# Patient Record
Sex: FEMALE | Race: WHITE | Employment: OTHER | URBAN - METROPOLITAN AREA
[De-identification: names, ages, dates, MRNs, and addresses within clinical notes are randomized per-mention and may not be internally consistent; named-entity substitution may affect disease eponyms.]

---

## 2017-11-08 ENCOUNTER — OFFICE VISIT (OUTPATIENT)
Dept: FAMILY MEDICINE CLINIC | Age: 62
End: 2017-11-08

## 2017-11-08 VITALS
WEIGHT: 196 LBS | HEART RATE: 85 BPM | DIASTOLIC BLOOD PRESSURE: 90 MMHG | SYSTOLIC BLOOD PRESSURE: 150 MMHG | OXYGEN SATURATION: 96 % | HEIGHT: 65 IN | BODY MASS INDEX: 32.65 KG/M2

## 2017-11-08 DIAGNOSIS — I15.9 SECONDARY HYPERTENSION: ICD-10-CM

## 2017-11-08 DIAGNOSIS — Z23 NEED FOR TDAP VACCINATION: ICD-10-CM

## 2017-11-08 DIAGNOSIS — Z12.39 BREAST CANCER SCREENING: ICD-10-CM

## 2017-11-08 DIAGNOSIS — Z23 NEED FOR INFLUENZA VACCINATION: Primary | ICD-10-CM

## 2017-11-08 DIAGNOSIS — E78.49 OTHER HYPERLIPIDEMIA: ICD-10-CM

## 2017-11-08 PROCEDURE — 90472 IMMUNIZATION ADMIN EACH ADD: CPT | Performed by: FAMILY MEDICINE

## 2017-11-08 PROCEDURE — 90630 INFLUENZA, QUADV, 18-64 YRS, ID, PF, MICRO INJ, 0.1ML (FLUZONE QUADV, PF): CPT | Performed by: FAMILY MEDICINE

## 2017-11-08 PROCEDURE — 3017F COLORECTAL CA SCREEN DOC REV: CPT | Performed by: FAMILY MEDICINE

## 2017-11-08 PROCEDURE — 99204 OFFICE O/P NEW MOD 45 MIN: CPT | Performed by: FAMILY MEDICINE

## 2017-11-08 PROCEDURE — 90715 TDAP VACCINE 7 YRS/> IM: CPT | Performed by: FAMILY MEDICINE

## 2017-11-08 PROCEDURE — 90471 IMMUNIZATION ADMIN: CPT | Performed by: FAMILY MEDICINE

## 2017-11-08 PROCEDURE — G8417 CALC BMI ABV UP PARAM F/U: HCPCS | Performed by: FAMILY MEDICINE

## 2017-11-08 PROCEDURE — G8427 DOCREV CUR MEDS BY ELIG CLIN: HCPCS | Performed by: FAMILY MEDICINE

## 2017-11-08 PROCEDURE — 4004F PT TOBACCO SCREEN RCVD TLK: CPT | Performed by: FAMILY MEDICINE

## 2017-11-08 PROCEDURE — G8484 FLU IMMUNIZE NO ADMIN: HCPCS | Performed by: FAMILY MEDICINE

## 2017-11-08 PROCEDURE — 3014F SCREEN MAMMO DOC REV: CPT | Performed by: FAMILY MEDICINE

## 2017-11-08 RX ORDER — LOSARTAN POTASSIUM 100 MG/1
TABLET ORAL
Refills: 0 | COMMUNITY
Start: 2017-10-12 | End: 2018-07-13 | Stop reason: SDUPTHER

## 2017-11-08 RX ORDER — ESTROGEN,CON/M-PROGEST ACET 0.625-2.5
TABLET ORAL
Refills: 2 | COMMUNITY
Start: 2017-10-01 | End: 2018-11-13

## 2017-11-08 RX ORDER — ATORVASTATIN CALCIUM 20 MG/1
TABLET, FILM COATED ORAL
Refills: 1 | COMMUNITY
Start: 2017-10-23 | End: 2018-01-22 | Stop reason: SDUPTHER

## 2017-11-08 ASSESSMENT — ENCOUNTER SYMPTOMS
CONSTIPATION: 0
DIARRHEA: 0
COUGH: 0
ABDOMINAL PAIN: 0
SHORTNESS OF BREATH: 0
CHEST TIGHTNESS: 0

## 2017-11-08 ASSESSMENT — PATIENT HEALTH QUESTIONNAIRE - PHQ9
SUM OF ALL RESPONSES TO PHQ9 QUESTIONS 1 & 2: 2
2. FEELING DOWN, DEPRESSED OR HOPELESS: 1
1. LITTLE INTEREST OR PLEASURE IN DOING THINGS: 1
SUM OF ALL RESPONSES TO PHQ QUESTIONS 1-9: 2

## 2017-11-08 NOTE — PROGRESS NOTES
Vaccine Information Sheet, \"Influenza - Inactivated\"  given to Jairo Jean, or parent/legal guardian of  Jairo Jean and verbalized understanding. Patient responses:    Have you ever had a reaction to a flu vaccine? No  Are you able to eat eggs without adverse effects? Yes  Do you have any current illness? No  Have you ever had Guillian [place] Syndrome? No    Flu vaccine given per order. Please see immunization tab.

## 2017-11-08 NOTE — PROGRESS NOTES
Damaris Burgess  : 1955  Encounter date: 2017    This is a 59 y.o. female who presents to establish care. Chief Complaint   Patient presents with    New Patient     Patient is here to establish a physician with no concerns. History of present illness:    Feeling a little depressed since moving down here. Had lived in previous location all of life. No significant hx of depression. Moved to this area to be closer to daughter and grandchildren. Had been doing water therapy 5 days a week for awhile, but hasn't here. Right hip bothers her; told she had bursitis. Seems like any activity flares this. Feels that lower extremities are weak. Did not do PT after spinal stenosis surgery. Right weaker. Tolerates chronic meds ok. Started prempro in  for hot flashes (getting them every 20 minutes 24 hours a day. Really affected overall functioning/work). Did lower dose a couple of years ago; didn't go well. Lowering dose felt like she was more emotional; cried easier. Hadn't tried anything else for this in past.     Anxiety is present but seems pretty limited to being passenger in vehicle. Anxiety seemed worse and panic attacks were worse when coming off hormone previously. Has some desire to quit smoking. Tried patches - gave her insomnia. Has quit in the past cold turkey, but states longest period of time was a couple of months.     Past Medical History:   Diagnosis Date    Hyperlipidemia     Hypertension      Past Surgical History:   Procedure Laterality Date    BACK SURGERY      spinal stenosis    LUNG SURGERY      not cancer     Allergies   Allergen Reactions    Codeine Other (See Comments)     Wasn't able to speak or move     No outpatient prescriptions have been marked as taking for the 17 encounter (Office Visit) with Veronica Osuna MD.     Social History   Substance Use Topics    Smoking status: Current Every Day Smoker     Packs/day: 0.50     Years: 20.00 Types: Cigarettes    Smokeless tobacco: Never Used    Alcohol use 3.0 oz/week     5 Glasses of wine per week     Family History   Problem Relation Age of Onset    Cancer Mother      thyroid    High Cholesterol Mother     Heart Disease Sister          Review of Systems   Constitutional: Negative for fatigue. Respiratory: Negative for cough, chest tightness and shortness of breath. Cardiovascular: Negative for chest pain. Gastrointestinal: Negative for abdominal pain, constipation and diarrhea. Psychiatric/Behavioral: Negative for suicidal ideas. The patient is nervous/anxious (see HPI). Objective:    BP (!) 150/90   Pulse 85   Ht 5' 5.25\" (1.657 m)   Wt 196 lb (88.9 kg)   SpO2 96%   BMI 32.37 kg/m²   Weight: 196 lb (88.9 kg)     BP Readings from Last 3 Encounters:   11/08/17 (!) 150/90     Wt Readings from Last 3 Encounters:   11/08/17 196 lb (88.9 kg)       Physical Exam   Constitutional: She appears well-developed and well-nourished. Cardiovascular: Normal rate, regular rhythm and normal heart sounds. No murmur heard. 2+pedal pulses; no LE edema   Pulmonary/Chest: Effort normal and breath sounds normal. No respiratory distress. She has no wheezes. She has no rales. Musculoskeletal:   There is slight tenderness to right trochanteric bursa, and more significant tenderness of right IT band. Neurological: She displays no atrophy. Reflex Scores:       Patellar reflexes are 1+ on the right side and 2+ on the left side. Achilles reflexes are 1+ on the right side and 1+ on the left side. Right hip flexion 4.5 out of 5, right hamstring 4.5 out of 5, right quad 4.5/5. Left hip flexion 4.5/5, quad 4.5/5, hamstring 5/5. Assessment/Plan:    1. Need for influenza vaccination  Completed in office today. - INFLUENZA, QUADV, 18-64 YRS, ID, PF, MICRO INJ, 0.1ML (FLUZONE QUADV, PF)    2. Breast cancer screening    - DAVID Screening Bilateral; Future    3.  Need for Tdap

## 2017-11-08 NOTE — PROGRESS NOTES
Vaccine Information Sheet, \"Influenza - Inactivated\"  given to Shira Mahoney, or parent/legal guardian of  Shira [place] and verbalized understanding. Patient responses:    Have you ever had a reaction to a flu vaccine? No  Are you able to eat eggs without adverse effects? Yes  Do you have any current illness? No  Have you ever had Guillian [place] Syndrome? No    Flu vaccine given per order. Please see immunization tab.

## 2017-11-08 NOTE — PATIENT INSTRUCTIONS
Consider the effexor in place of the prempro; if interested just let me know and I can send in and give you instructions on how to taper the prempro/start the effexor. Discussed health consequences of smoking and benefits of smoking cessation with patient in detail. Discussed medical treatment options including patches, gums, zyban, and chantix. Encouraged to let me know if any concerns or if needing help with quitting. 1-800-QUIT-NOW      Patient Education        Influenza (Flu) Vaccine (Inactivated or Recombinant): What You Need to Know  Why get vaccinated? Influenza (\"flu\") is a contagious disease that spreads around the [facility] every winter, usually between October and May. Flu is caused by influenza viruses and is spread mainly by coughing, sneezing, and close contact. Anyone can get flu. Flu strikes suddenly and can last several days. Symptoms vary by age, but can include:  · Fever/chills. · Sore throat. · Muscle aches. · Fatigue. · Cough. · Headache. · Runny or stuffy nose. Flu can also lead to pneumonia and blood infections, and cause diarrhea and seizures in children. If you have a medical condition, such as heart or lung disease, flu can make it worse. Flu is more dangerous for some people. Infants and young children, people 72years of age and older, pregnant women, and people with certain health conditions or a weakened immune system are at greatest risk. Each year thousands of people in the [facility] die from flu, and many more are hospitalized. Flu vaccine can:  · Keep you from getting flu. · Make flu less severe if you do get it. · Keep you from spreading flu to your family and other people. Inactivated and recombinant flu vaccines  A dose of flu vaccine is recommended every flu season. Children 6 months through 6years of age may need two doses during the same flu season. Everyone else needs only one dose each flu season.   Some inactivated flu vaccines contain a very small amount of a mercury-based preservative called thimerosal. Studies have not shown thimerosal in vaccines to be harmful, but flu vaccines that do not contain thimerosal are available. There is no live flu virus in flu shots. They cannot cause the flu. There are many flu viruses, and they are always changing. Each year a new flu vaccine is made to protect against three or four viruses that are likely to cause disease in the upcoming flu season. But even when the vaccine doesn't exactly match these viruses, it may still provide some protection. Flu vaccine cannot prevent:  · Flu that is caused by a virus not covered by the vaccine. · Illnesses that look like flu but are not. Some people should not get this vaccine  Tell the person who is giving you the vaccine:  · If you have any severe (life-threatening) allergies. If you ever had a life-threatening allergic reaction after a dose of flu vaccine, or have a severe allergy to any part of this vaccine, you may be advised not to get vaccinated. Most, but not all, types of flu vaccine contain a small amount of egg protein. · If you ever had Guillain-Barré syndrome (also called GBS) Some people with a history of GBS should not get this vaccine. This should be discussed with your doctor. · If you are not feeling well. It is usually okay to get flu vaccine when you have a mild illness, but you might be asked to come back when you feel better. Risks of a vaccine reaction  With any medicine, including vaccines, there is a chance of reactions. These are usually mild and go away on their own, but serious reactions are also possible. Most people who get a flu shot do not have any problems with it.   Minor problems following a flu shot include:  · Soreness, redness, or swelling where the shot was given  · Hoarseness  · Sore, red or itchy eyes  · Cough  · Fever  · Aches  · Headache  · Itching  · Fatigue  If these problems occur, they usually begin soon after the shot and this information.

## 2017-11-27 ENCOUNTER — HOSPITAL ENCOUNTER (OUTPATIENT)
Dept: MAMMOGRAPHY | Age: 62
Discharge: HOME | End: 2017-11-27
Attending: FAMILY MEDICINE | Admitting: FAMILY MEDICINE

## 2017-11-27 DIAGNOSIS — I15.9 SECONDARY HYPERTENSION: ICD-10-CM

## 2017-11-27 DIAGNOSIS — Z12.39 BREAST CANCER SCREENING: ICD-10-CM

## 2017-11-27 DIAGNOSIS — E78.49 OTHER HYPERLIPIDEMIA: ICD-10-CM

## 2017-11-27 LAB
A/G RATIO: 1.4 (ref 1.1–2.2)
ALBUMIN SERPL-MCNC: 4 G/DL (ref 3.4–5)
ALP BLD-CCNC: 55 U/L (ref 40–129)
ALT SERPL-CCNC: 25 U/L (ref 10–40)
ANION GAP SERPL CALCULATED.3IONS-SCNC: 15 MMOL/L (ref 3–16)
AST SERPL-CCNC: 22 U/L (ref 15–37)
BASOPHILS ABSOLUTE: 0.1 K/UL (ref 0–0.2)
BASOPHILS RELATIVE PERCENT: 1.1 %
BILIRUB SERPL-MCNC: 0.7 MG/DL (ref 0–1)
BUN BLDV-MCNC: 12 MG/DL (ref 7–20)
CALCIUM SERPL-MCNC: 9.7 MG/DL (ref 8.3–10.6)
CHLORIDE BLD-SCNC: 105 MMOL/L (ref 99–110)
CHOLESTEROL, TOTAL: 183 MG/DL (ref 0–199)
CO2: 24 MMOL/L (ref 21–32)
CREAT SERPL-MCNC: 0.6 MG/DL (ref 0.6–1.2)
EOSINOPHILS ABSOLUTE: 0.1 K/UL (ref 0–0.6)
EOSINOPHILS RELATIVE PERCENT: 1.9 %
GFR AFRICAN AMERICAN: >60
GFR NON-AFRICAN AMERICAN: >60
GLOBULIN: 2.8 G/DL
GLUCOSE BLD-MCNC: 99 MG/DL (ref 70–99)
HCT VFR BLD CALC: 41.3 % (ref 36–48)
HDLC SERPL-MCNC: 88 MG/DL (ref 40–60)
HEMOGLOBIN: 13.8 G/DL (ref 12–16)
LDL CHOLESTEROL CALCULATED: 74 MG/DL
LYMPHOCYTES ABSOLUTE: 1.8 K/UL (ref 1–5.1)
LYMPHOCYTES RELATIVE PERCENT: 30.4 %
MCH RBC QN AUTO: 33.7 PG (ref 26–34)
MCHC RBC AUTO-ENTMCNC: 33.5 G/DL (ref 31–36)
MCV RBC AUTO: 100.5 FL (ref 80–100)
MONOCYTES ABSOLUTE: 0.5 K/UL (ref 0–1.3)
MONOCYTES RELATIVE PERCENT: 8 %
NEUTROPHILS ABSOLUTE: 3.4 K/UL (ref 1.7–7.7)
NEUTROPHILS RELATIVE PERCENT: 58.6 %
PDW BLD-RTO: 13.9 % (ref 12.4–15.4)
PLATELET # BLD: 213 K/UL (ref 135–450)
PMV BLD AUTO: 8.7 FL (ref 5–10.5)
POTASSIUM SERPL-SCNC: 4.7 MMOL/L (ref 3.5–5.1)
RBC # BLD: 4.11 M/UL (ref 4–5.2)
SODIUM BLD-SCNC: 144 MMOL/L (ref 136–145)
TOTAL PROTEIN: 6.8 G/DL (ref 6.4–8.2)
TRIGL SERPL-MCNC: 106 MG/DL (ref 0–150)
VLDLC SERPL CALC-MCNC: 21 MG/DL
WBC # BLD: 5.8 K/UL (ref 4–11)

## 2017-11-30 ENCOUNTER — NURSE ONLY (OUTPATIENT)
Dept: FAMILY MEDICINE CLINIC | Age: 62
End: 2017-11-30

## 2017-11-30 VITALS — DIASTOLIC BLOOD PRESSURE: 108 MMHG | SYSTOLIC BLOOD PRESSURE: 150 MMHG | HEART RATE: 76 BPM

## 2017-11-30 DIAGNOSIS — I15.9 SECONDARY HYPERTENSION: Primary | ICD-10-CM

## 2017-11-30 NOTE — PROGRESS NOTES
Per Dr. Abelardo More there were a couple of lows at home so she is going to have her monitor her blood pressure at home and call or email us the bp readings.

## 2018-01-18 ENCOUNTER — TELEPHONE (OUTPATIENT)
Dept: FAMILY MEDICINE CLINIC | Age: 63
End: 2018-01-18

## 2018-01-25 RX ORDER — ATORVASTATIN CALCIUM 20 MG/1
TABLET, FILM COATED ORAL
Qty: 90 TABLET | Refills: 0 | Status: SHIPPED | OUTPATIENT
Start: 2018-01-25 | End: 2018-04-19 | Stop reason: SDUPTHER

## 2018-04-19 RX ORDER — ATORVASTATIN CALCIUM 20 MG/1
TABLET, FILM COATED ORAL
Qty: 90 TABLET | Refills: 0 | Status: SHIPPED | OUTPATIENT
Start: 2018-04-19 | End: 2018-06-19 | Stop reason: SDUPTHER

## 2018-06-19 RX ORDER — ATORVASTATIN CALCIUM 20 MG/1
TABLET, FILM COATED ORAL
Qty: 90 TABLET | Refills: 0 | Status: SHIPPED | OUTPATIENT
Start: 2018-06-19 | End: 2018-09-08 | Stop reason: SDUPTHER

## 2018-07-13 RX ORDER — LOSARTAN POTASSIUM 100 MG/1
TABLET ORAL
Qty: 30 TABLET | Refills: 2 | Status: SHIPPED | OUTPATIENT
Start: 2018-07-13 | End: 2018-10-17 | Stop reason: SDUPTHER

## 2018-07-26 ENCOUNTER — OFFICE VISIT (OUTPATIENT)
Dept: FAMILY MEDICINE CLINIC | Age: 63
End: 2018-07-26

## 2018-07-26 VITALS
SYSTOLIC BLOOD PRESSURE: 154 MMHG | DIASTOLIC BLOOD PRESSURE: 92 MMHG | WEIGHT: 195 LBS | HEIGHT: 66 IN | HEART RATE: 86 BPM | BODY MASS INDEX: 31.34 KG/M2 | OXYGEN SATURATION: 98 %

## 2018-07-26 DIAGNOSIS — Z72.0 TOBACCO ABUSE: ICD-10-CM

## 2018-07-26 DIAGNOSIS — Z00.00 PREVENTATIVE HEALTH CARE: Primary | ICD-10-CM

## 2018-07-26 DIAGNOSIS — N95.1 MENOPAUSAL SYMPTOMS: ICD-10-CM

## 2018-07-26 DIAGNOSIS — Z85.118 HISTORY OF ADENOCARCINOMA OF LUNG: ICD-10-CM

## 2018-07-26 DIAGNOSIS — Z12.4 SCREENING FOR CERVICAL CANCER: ICD-10-CM

## 2018-07-26 DIAGNOSIS — R25.2 MUSCLE CRAMPING: ICD-10-CM

## 2018-07-26 DIAGNOSIS — F41.9 ANXIETY: ICD-10-CM

## 2018-07-26 DIAGNOSIS — M70.61 TROCHANTERIC BURSITIS OF RIGHT HIP: ICD-10-CM

## 2018-07-26 DIAGNOSIS — E78.49 OTHER HYPERLIPIDEMIA: ICD-10-CM

## 2018-07-26 PROCEDURE — 20610 DRAIN/INJ JOINT/BURSA W/O US: CPT | Performed by: FAMILY MEDICINE

## 2018-07-26 PROCEDURE — 99396 PREV VISIT EST AGE 40-64: CPT | Performed by: FAMILY MEDICINE

## 2018-07-26 RX ORDER — TRIAMCINOLONE ACETONIDE 40 MG/ML
40 INJECTION, SUSPENSION INTRA-ARTICULAR; INTRAMUSCULAR ONCE
Status: COMPLETED | OUTPATIENT
Start: 2018-07-26 | End: 2018-07-26

## 2018-07-26 RX ORDER — TRIAMCINOLONE ACETONIDE 40 MG/ML
40 INJECTION, SUSPENSION INTRA-ARTICULAR; INTRAMUSCULAR ONCE
Qty: 1 ML | Refills: 0 | Status: SHIPPED | OUTPATIENT
Start: 2018-07-26 | End: 2018-07-26

## 2018-07-26 RX ORDER — VENLAFAXINE HYDROCHLORIDE 37.5 MG/1
37.5 CAPSULE, EXTENDED RELEASE ORAL DAILY
Qty: 30 CAPSULE | Refills: 2 | Status: SHIPPED | OUTPATIENT
Start: 2018-07-26 | End: 2018-10-10 | Stop reason: SDUPTHER

## 2018-07-26 RX ADMIN — TRIAMCINOLONE ACETONIDE 40 MG: 40 INJECTION, SUSPENSION INTRA-ARTICULAR; INTRAMUSCULAR at 13:55

## 2018-07-26 ASSESSMENT — ENCOUNTER SYMPTOMS
EYE DISCHARGE: 0
BACK PAIN: 0
COUGH: 0
ABDOMINAL PAIN: 0
VOMITING: 0
CONSTIPATION: 0
WHEEZING: 0
DIARRHEA: 0
RHINORRHEA: 0
SORE THROAT: 0
SINUS PRESSURE: 0
NAUSEA: 0
TROUBLE SWALLOWING: 0
SHORTNESS OF BREATH: 0
CHEST TIGHTNESS: 0

## 2018-07-26 ASSESSMENT — PATIENT HEALTH QUESTIONNAIRE - PHQ9
2. FEELING DOWN, DEPRESSED OR HOPELESS: 1
SUM OF ALL RESPONSES TO PHQ QUESTIONS 1-9: 1
1. LITTLE INTEREST OR PLEASURE IN DOING THINGS: 0
SUM OF ALL RESPONSES TO PHQ9 QUESTIONS 1 & 2: 1

## 2018-07-26 NOTE — PROGRESS NOTES
Matilde Maynard  : 1955  Encounter date: 2018    This is a 57 y.o. female who presents for complete physical     History of present illness/Additional concerns:    Has not had abnormal paps in past, but has had some bleeding issues. No spotting, no vaginal concerns today. Cost is concern for her with follow up for lungs; knows she should follow up with specialist and get repeat CT but feels she cannot afford it now. Right hip really bothering her. Bothers her with walking any distance; makes her limp. Keeps her from doing activities that she would like to do. Has worsened in last year. Can't lay on side for sleep (either side). Getting some radiation of pain down leg. Not wanting to quit smoking, but has thought about it. Past Medical History:   Diagnosis Date    Adenocarcinoma, lung ([address]     Hyperlipidemia     Hypertension      Past Surgical History:   Procedure Laterality Date    BACK SURGERY      spinal stenosis    LUNG SURGERY      left VATS for stage IA adenocarcinoma     Allergies   Allergen Reactions    Codeine Other (See Comments)     Wasn't able to speak or move     Outpatient Prescriptions Marked as Taking for the 18 encounter (Office Visit) with Paulina Ramos MD   Medication Sig Dispense Refill    venlafaxine (EFFEXOR XR) 37.5 MG extended release capsule Take 1 capsule by mouth daily 30 capsule 2    [] triamcinolone acetonide (KENALOG) 40 MG/ML injection Inject 1 mL into the muscle once for 1 dose 1 mL 0    losartan (COZAAR) 100 MG tablet TAKE 1 TABLET BY MOUTH EVERY DAY MUST FOLLOW UP IN OFFICE FOR MORE REFILLS 30 tablet 2    atorvastatin (LIPITOR) 20 MG tablet TAKE 1 TABLET BY MOUTH DAILY AT BEDTIME.  90 tablet 0    PREMPRO 0.625-2.5 MG per tablet TAKE 1 TABLET BY MOUTH EVERY DAY**NOT COVERE**  2     Social History   Substance Use Topics    Smoking status: Current Every Day Smoker     Packs/day: 0.50     Years: 20.00     Types: AGRATIO 1.4 11/27/2017    BILITOT 0.7 11/27/2017    ALKPHOS 55 11/27/2017    ALT 25 11/27/2017    AST 22 11/27/2017    GLOB 2.8 11/27/2017     LIPID:   Lab Results   Component Value Date    CHOL 183 11/27/2017    TRIG 106 11/27/2017    HDL 88 11/27/2017    LDLCALC 74 11/27/2017    LABVLDL 21 11/27/2017       Objective:    BP (!) 154/92 (Site: Left Arm, Position: Sitting, Cuff Size: Large Adult)   Pulse 86   Ht 5' 5.5\" (1.664 m)   Wt 195 lb (88.5 kg)   SpO2 98%   BMI 31.96 kg/m²   Weight: 195 lb (88.5 kg)     BP Readings from Last 3 Encounters:   07/26/18 (!) 154/92   11/30/17 (!) 150/108   11/08/17 (!) 150/90     Wt Readings from Last 3 Encounters:   07/26/18 195 lb (88.5 kg)   11/08/17 196 lb (88.9 kg)       Physical Exam   Constitutional: She is oriented to person, place, and time. She appears well-developed and well-nourished. No distress. HENT:   Head: Normocephalic and atraumatic. Eyes: Conjunctivae and EOM are normal. Pupils are equal, round, and reactive to light. Neck: Normal range of motion. Neck supple. Cardiovascular: Normal rate, regular rhythm and normal heart sounds. Pulmonary/Chest: Effort normal and breath sounds normal. Right breast exhibits no inverted nipple, no mass and no skin change. Left breast exhibits no inverted nipple, no mass and no skin change. Breasts are symmetrical.   Abdominal: Soft. Bowel sounds are normal.   Genitourinary: Rectal exam shows external hemorrhoid (nontender; multiple 0.75 cm). No breast swelling, tenderness, discharge or bleeding. Pelvic exam was performed with patient supine. There is no rash, tenderness, lesion or injury on the right labia. There is no rash, tenderness, lesion or injury on the left labia. Cervix exhibits no motion tenderness, no discharge and no friability. Right adnexum displays no mass, no tenderness and no fullness. Left adnexum displays no mass, no tenderness and no fullness.  Vaginal discharge (some white, cheesy discharge noted

## 2018-07-30 DIAGNOSIS — E78.49 OTHER HYPERLIPIDEMIA: ICD-10-CM

## 2018-07-30 DIAGNOSIS — R25.2 MUSCLE CRAMPING: ICD-10-CM

## 2018-07-30 LAB
A/G RATIO: 2.1 (ref 1.1–2.2)
ALBUMIN SERPL-MCNC: 4.8 G/DL (ref 3.4–5)
ALP BLD-CCNC: 58 U/L (ref 40–129)
ALT SERPL-CCNC: 23 U/L (ref 10–40)
ANION GAP SERPL CALCULATED.3IONS-SCNC: 16 MMOL/L (ref 3–16)
AST SERPL-CCNC: 20 U/L (ref 15–37)
BASOPHILS ABSOLUTE: 0.1 K/UL (ref 0–0.2)
BASOPHILS RELATIVE PERCENT: 1 %
BILIRUB SERPL-MCNC: 0.7 MG/DL (ref 0–1)
BUN BLDV-MCNC: 13 MG/DL (ref 7–20)
CALCIUM SERPL-MCNC: 10 MG/DL (ref 8.3–10.6)
CHLORIDE BLD-SCNC: 101 MMOL/L (ref 99–110)
CHOLESTEROL, TOTAL: 206 MG/DL (ref 0–199)
CO2: 24 MMOL/L (ref 21–32)
CREAT SERPL-MCNC: 0.6 MG/DL (ref 0.6–1.2)
EOSINOPHILS ABSOLUTE: 0.1 K/UL (ref 0–0.6)
EOSINOPHILS RELATIVE PERCENT: 0.7 %
GFR AFRICAN AMERICAN: >60
GFR NON-AFRICAN AMERICAN: >60
GLOBULIN: 2.3 G/DL
GLUCOSE BLD-MCNC: 71 MG/DL (ref 70–99)
HCT VFR BLD CALC: 43.7 % (ref 36–48)
HDLC SERPL-MCNC: 111 MG/DL (ref 40–60)
HEMOGLOBIN: 14.6 G/DL (ref 12–16)
HPV COMMENT: NORMAL
HPV TYPE 16: NOT DETECTED
HPV TYPE 18: NOT DETECTED
HPVOH (OTHER TYPES): NOT DETECTED
LDL CHOLESTEROL CALCULATED: 77 MG/DL
LYMPHOCYTES ABSOLUTE: 1.8 K/UL (ref 1–5.1)
LYMPHOCYTES RELATIVE PERCENT: 20.7 %
MCH RBC QN AUTO: 34.3 PG (ref 26–34)
MCHC RBC AUTO-ENTMCNC: 33.4 G/DL (ref 31–36)
MCV RBC AUTO: 102.8 FL (ref 80–100)
MONOCYTES ABSOLUTE: 0.5 K/UL (ref 0–1.3)
MONOCYTES RELATIVE PERCENT: 5.4 %
NEUTROPHILS ABSOLUTE: 6.2 K/UL (ref 1.7–7.7)
NEUTROPHILS RELATIVE PERCENT: 72.2 %
PDW BLD-RTO: 13.8 % (ref 12.4–15.4)
PLATELET # BLD: 247 K/UL (ref 135–450)
PMV BLD AUTO: 8.1 FL (ref 5–10.5)
POTASSIUM SERPL-SCNC: 4 MMOL/L (ref 3.5–5.1)
RBC # BLD: 4.25 M/UL (ref 4–5.2)
SODIUM BLD-SCNC: 141 MMOL/L (ref 136–145)
TOTAL PROTEIN: 7.1 G/DL (ref 6.4–8.2)
TRIGL SERPL-MCNC: 88 MG/DL (ref 0–150)
TSH SERPL DL<=0.05 MIU/L-ACNC: 0.64 UIU/ML (ref 0.27–4.2)
VLDLC SERPL CALC-MCNC: 18 MG/DL
WBC # BLD: 8.7 K/UL (ref 4–11)

## 2018-08-02 DIAGNOSIS — R89.9 ABNORMAL LABORATORY TEST RESULT: Primary | ICD-10-CM

## 2018-08-02 LAB
FOLATE: 4.34 NG/ML (ref 4.78–24.2)
VITAMIN B-12: 222 PG/ML (ref 211–911)

## 2018-08-03 DIAGNOSIS — E53.8 LOW VITAMIN B12 LEVEL: Primary | ICD-10-CM

## 2018-08-03 DIAGNOSIS — E53.8 LOW FOLIC ACID: ICD-10-CM

## 2018-08-03 DIAGNOSIS — E53.8 LOW VITAMIN B12 LEVEL: ICD-10-CM

## 2018-08-06 ENCOUNTER — NURSE ONLY (OUTPATIENT)
Dept: FAMILY MEDICINE CLINIC | Age: 63
End: 2018-08-06

## 2018-08-06 VITALS — HEART RATE: 74 BPM | DIASTOLIC BLOOD PRESSURE: 88 MMHG | SYSTOLIC BLOOD PRESSURE: 122 MMHG

## 2018-08-06 NOTE — PROGRESS NOTES
Patient stopped by office to have her monitor check due to changing some medications at her last visit. Blood pressure using the office wall monitor: 122/88. Blood pressure using patient's home monitor 137/93. Patient voices no concerns of feeling light headed or dizziness. Patient advised that Dr. Marcia Bose is out of the office today but will be back tomorrow. Patient advised that I could speak with another doctor in the office. Patient states that it can wait until Dr. Marcia Bose comes back. Patient was just wanting to see the difference between our monitor and her monitor. Patient advised that usine an automatic blood pressure cuff and a kindra blood pressure there can be a slight difference.

## 2018-08-09 ENCOUNTER — TELEPHONE (OUTPATIENT)
Dept: FAMILY MEDICINE CLINIC | Age: 63
End: 2018-08-09

## 2018-09-10 RX ORDER — ATORVASTATIN CALCIUM 20 MG/1
TABLET, FILM COATED ORAL
Qty: 90 TABLET | Refills: 0 | Status: SHIPPED | OUTPATIENT
Start: 2018-09-10 | End: 2018-12-03 | Stop reason: SDUPTHER

## 2018-10-08 RX ORDER — LOSARTAN POTASSIUM 100 MG/1
TABLET ORAL
Qty: 30 TABLET | Refills: 2 | OUTPATIENT
Start: 2018-10-08

## 2018-10-10 DIAGNOSIS — N95.1 MENOPAUSAL SYMPTOMS: ICD-10-CM

## 2018-10-10 DIAGNOSIS — F41.9 ANXIETY: ICD-10-CM

## 2018-10-10 RX ORDER — VENLAFAXINE HYDROCHLORIDE 37.5 MG/1
37.5 CAPSULE, EXTENDED RELEASE ORAL DAILY
Qty: 90 CAPSULE | Refills: 1
Start: 2018-10-10 | End: 2018-10-25 | Stop reason: SDUPTHER

## 2018-10-17 ENCOUNTER — TELEPHONE (OUTPATIENT)
Dept: FAMILY MEDICINE CLINIC | Age: 63
End: 2018-10-17

## 2018-10-17 RX ORDER — LOSARTAN POTASSIUM 100 MG/1
TABLET ORAL
Qty: 30 TABLET | Refills: 1 | Status: SHIPPED | OUTPATIENT
Start: 2018-10-17 | End: 2018-11-13 | Stop reason: SDUPTHER

## 2018-10-17 NOTE — TELEPHONE ENCOUNTER
losartan (COZAAR) 100 MG tablet 30 tablet 2 7/13/2018     Sig: TAKE 1 TABLET BY MOUTH EVERY DAY MUST FOLLOW UP IN OFFICE FOR MORE REFILLS      Pt has appt 12/28 with LIVIER green in chart

## 2018-10-25 DIAGNOSIS — N95.1 MENOPAUSAL SYMPTOMS: ICD-10-CM

## 2018-10-25 DIAGNOSIS — F41.9 ANXIETY: ICD-10-CM

## 2018-10-26 ENCOUNTER — TELEPHONE (OUTPATIENT)
Dept: FAMILY MEDICINE CLINIC | Age: 63
End: 2018-10-26

## 2018-10-26 RX ORDER — VENLAFAXINE HYDROCHLORIDE 37.5 MG/1
37.5 CAPSULE, EXTENDED RELEASE ORAL DAILY
Qty: 90 CAPSULE | Refills: 0 | Status: SHIPPED | OUTPATIENT
Start: 2018-10-26 | End: 2018-12-03 | Stop reason: SDUPTHER

## 2018-10-26 NOTE — TELEPHONE ENCOUNTER
venlafaxine (EFFEXOR XR) 37.5 MG extended release capsule 90 capsule 0 10/26/2018     Sig - Route:  Take 1 capsule by mouth daily - Oral      Pharmacy:  [address] [place] [address]    Patient states the medication is helping her    Please advise

## 2018-11-13 ENCOUNTER — OFFICE VISIT (OUTPATIENT)
Dept: FAMILY MEDICINE CLINIC | Age: 63
End: 2018-11-13
Payer: COMMERCIAL

## 2018-11-13 VITALS
SYSTOLIC BLOOD PRESSURE: 160 MMHG | HEART RATE: 82 BPM | BODY MASS INDEX: 30.81 KG/M2 | OXYGEN SATURATION: 98 % | DIASTOLIC BLOOD PRESSURE: 100 MMHG | WEIGHT: 188 LBS

## 2018-11-13 DIAGNOSIS — Z85.118 HISTORY OF ADENOCARCINOMA OF LUNG: ICD-10-CM

## 2018-11-13 DIAGNOSIS — E78.5 HYPERLIPIDEMIA, UNSPECIFIED HYPERLIPIDEMIA TYPE: ICD-10-CM

## 2018-11-13 DIAGNOSIS — I10 ESSENTIAL HYPERTENSION: Primary | ICD-10-CM

## 2018-11-13 DIAGNOSIS — E53.8 B12 DEFICIENCY: ICD-10-CM

## 2018-11-13 DIAGNOSIS — E53.8 FOLATE DEFICIENCY: ICD-10-CM

## 2018-11-13 PROCEDURE — 3017F COLORECTAL CA SCREEN DOC REV: CPT | Performed by: FAMILY MEDICINE

## 2018-11-13 PROCEDURE — 99214 OFFICE O/P EST MOD 30 MIN: CPT | Performed by: FAMILY MEDICINE

## 2018-11-13 PROCEDURE — 4004F PT TOBACCO SCREEN RCVD TLK: CPT | Performed by: FAMILY MEDICINE

## 2018-11-13 PROCEDURE — G8484 FLU IMMUNIZE NO ADMIN: HCPCS | Performed by: FAMILY MEDICINE

## 2018-11-13 PROCEDURE — G8417 CALC BMI ABV UP PARAM F/U: HCPCS | Performed by: FAMILY MEDICINE

## 2018-11-13 PROCEDURE — G8427 DOCREV CUR MEDS BY ELIG CLIN: HCPCS | Performed by: FAMILY MEDICINE

## 2018-11-13 RX ORDER — LOSARTAN POTASSIUM 100 MG/1
TABLET ORAL
Qty: 90 TABLET | Refills: 1 | Status: SHIPPED | OUTPATIENT
Start: 2018-11-13 | End: 2018-12-03 | Stop reason: SDUPTHER

## 2018-11-13 RX ORDER — HYDROCHLOROTHIAZIDE 25 MG/1
25 TABLET ORAL DAILY
Qty: 30 TABLET | Refills: 3 | Status: SHIPPED | OUTPATIENT
Start: 2018-11-13 | End: 2018-12-03 | Stop reason: SDUPTHER

## 2018-11-13 RX ORDER — LANOLIN ALCOHOL/MO/W.PET/CERES
400 CREAM (GRAM) TOPICAL DAILY
COMMUNITY
End: 2020-08-18

## 2018-11-13 RX ORDER — LANOLIN ALCOHOL/MO/W.PET/CERES
1000 CREAM (GRAM) TOPICAL DAILY
COMMUNITY
End: 2019-12-26 | Stop reason: ALTCHOICE

## 2018-11-13 NOTE — PROGRESS NOTES
2018     CMP:   Lab Results   Component Value Date     2018    K 4.0 2018     2018    CO2 24 2018    ANIONGAP 16 2018    GLUCOSE 71 2018    BUN 13 2018    CREATININE 0.6 2018    GFRAA >60 2018    CALCIUM 10.0 2018    PROT 7.1 2018    LABALBU 4.8 2018    AGRATIO 2.1 2018    BILITOT 0.7 2018    ALKPHOS 58 2018    ALT 23 2018    AST 20 2018    GLOB 2.3 2018     LIPID:   Lab Results   Component Value Date    CHOL 206 2018    TRIG 88 2018     2018    LDLCALC 77 2018    LABVLDL 18 2018              Allergies   Allergen Reactions    Codeine Other (See Comments)     Wasn't able to speak or move     Outpatient Prescriptions Marked as Taking for the 18 encounter (Office Visit) with Marbin Roblero MD   Medication Sig Dispense Refill    folic acid (FOLVITE) 160 MCG tablet Take 400 mcg by mouth daily      vitamin B-12 (CYANOCOBALAMIN) 1000 MCG tablet Take 1,000 mcg by mouth daily      losartan (COZAAR) 100 MG tablet TAKE 1 TABLET BY MOUTH EVERY DAY 90 tablet 1    hydrochlorothiazide (HYDRODIURIL) 25 MG tablet Take 1 tablet by mouth daily 30 tablet 3    [] zoster recombinant adjuvanted vaccine (SHINGRIX) 50 MCG/0.5ML SUSR injection Inject 0.5 mLs into the muscle once for 1 dose 0.5 mL 1    venlafaxine (EFFEXOR XR) 37.5 MG extended release capsule Take 1 capsule by mouth daily 90 capsule 0    atorvastatin (LIPITOR) 20 MG tablet TAKE 1 TABLET BY MOUTH DAILY AT BEDTIME. 90 tablet 0        Review of Systems   Constitutional: Negative for activity change, chills, fatigue and fever. Respiratory: Negative for cough and shortness of breath. Cardiovascular: Negative for chest pain and leg swelling.          Objective:    BP (!) 160/100   Pulse 82   Wt 188 lb (85.3 kg)   SpO2 98%   BMI 30.81 kg/m²   Weight: 188 lb (85.3 kg)     BP Readings from Last 3 Encounters:   11/13/18 (!) 160/100   08/06/18 122/88   07/26/18 (!) 154/92     Wt Readings from Last 3 Encounters:   11/13/18 188 lb (85.3 kg)   07/26/18 195 lb (88.5 kg)   11/08/17 196 lb (88.9 kg)       Physical Exam   Constitutional: She appears well-developed and well-nourished. Cardiovascular: Normal rate, regular rhythm and normal heart sounds. No murmur heard. 2+pedal pulses; no LE edema   Pulmonary/Chest: Effort normal and breath sounds normal. No respiratory distress. She has no wheezes. She has no rales. Assessment/Plan:  Health Maintenance Due   Topic Date Due    Hepatitis C screen  1955    HIV screen  12/07/1968    Pneumococcal med risk (1 of 1 - PPSV23) 12/07/1972    Shingles Vaccine (1 of 2 - 2 Dose Series) 12/07/2003    Colon cancer screen colonoscopy  12/07/2003     Health Maintenance reviewed. Goals Addressed     None          1. Essential hypertension  suboptimal control. Discussed CCB versus hctz. Doesn't want to take something with risk of LE edema. Will try the hctz. bloodwork to be completed in new year. SHe will drop off bp readings next month when  in for visit and have home cuff checked again against office cuff. - losartan (COZAAR) 100 MG tablet; TAKE 1 TABLET BY MOUTH EVERY DAY  Dispense: 90 tablet; Refill: 1  - hydrochlorothiazide (HYDRODIURIL) 25 MG tablet; Take 1 tablet by mouth daily  Dispense: 30 tablet; Refill: 3  - Comprehensive Metabolic Panel; Future    2. History of adenocarcinoma of lung    - CT CHEST W CONTRAST; Future    3. B12 deficiency  She has had improvement in energy level since starting B12 and folate. Continue these and recheck levels in new year. - Vitamin B12; Future    4. Folate deficiency  See above  - Folate; Future    5. Hyperlipidemia, unspecified hyperlipidemia type    - Lipid Panel;  Future      -continue current meds  -reviewed labs  -encouraged a healthy diet, regular exercise and maintaining a healthy

## 2018-11-14 ASSESSMENT — ENCOUNTER SYMPTOMS
SHORTNESS OF BREATH: 0
COUGH: 0

## 2018-12-03 ENCOUNTER — TELEPHONE (OUTPATIENT)
Dept: FAMILY MEDICINE CLINIC | Age: 63
End: 2018-12-03

## 2018-12-03 DIAGNOSIS — F41.9 ANXIETY: ICD-10-CM

## 2018-12-03 DIAGNOSIS — N95.1 MENOPAUSAL SYMPTOMS: ICD-10-CM

## 2018-12-03 DIAGNOSIS — I10 ESSENTIAL HYPERTENSION: ICD-10-CM

## 2018-12-03 RX ORDER — ATORVASTATIN CALCIUM 20 MG/1
TABLET, FILM COATED ORAL
Qty: 90 TABLET | Refills: 0 | Status: SHIPPED | OUTPATIENT
Start: 2018-12-03 | End: 2019-03-25 | Stop reason: SDUPTHER

## 2018-12-03 RX ORDER — HYDROCHLOROTHIAZIDE 25 MG/1
25 TABLET ORAL DAILY
Qty: 90 TABLET | Refills: 0 | Status: SHIPPED | OUTPATIENT
Start: 2018-12-03 | End: 2019-03-18 | Stop reason: SDUPTHER

## 2018-12-03 RX ORDER — LOSARTAN POTASSIUM 100 MG/1
TABLET ORAL
Qty: 90 TABLET | Refills: 0 | Status: SHIPPED | OUTPATIENT
Start: 2018-12-03 | End: 2019-03-18 | Stop reason: SDUPTHER

## 2018-12-03 RX ORDER — VENLAFAXINE HYDROCHLORIDE 37.5 MG/1
37.5 CAPSULE, EXTENDED RELEASE ORAL DAILY
Qty: 90 CAPSULE | Refills: 0 | Status: SHIPPED | OUTPATIENT
Start: 2018-12-03 | End: 2019-03-18 | Stop reason: SDUPTHER

## 2019-03-16 DIAGNOSIS — F41.9 ANXIETY: ICD-10-CM

## 2019-03-16 DIAGNOSIS — N95.1 MENOPAUSAL SYMPTOMS: ICD-10-CM

## 2019-03-16 DIAGNOSIS — I10 ESSENTIAL HYPERTENSION: ICD-10-CM

## 2019-03-18 RX ORDER — HYDROCHLOROTHIAZIDE 25 MG/1
25 TABLET ORAL DAILY
Qty: 90 TABLET | Refills: 0 | Status: SHIPPED | OUTPATIENT
Start: 2019-03-18 | End: 2019-06-13 | Stop reason: SDUPTHER

## 2019-03-18 RX ORDER — LOSARTAN POTASSIUM 100 MG/1
TABLET ORAL
Qty: 90 TABLET | Refills: 0 | Status: SHIPPED | OUTPATIENT
Start: 2019-03-18 | End: 2019-06-13 | Stop reason: SDUPTHER

## 2019-03-18 RX ORDER — VENLAFAXINE HYDROCHLORIDE 37.5 MG/1
37.5 CAPSULE, EXTENDED RELEASE ORAL DAILY
Qty: 90 CAPSULE | Refills: 0 | Status: SHIPPED | OUTPATIENT
Start: 2019-03-18 | End: 2019-06-13 | Stop reason: SDUPTHER

## 2019-03-25 RX ORDER — ATORVASTATIN CALCIUM 20 MG/1
TABLET, FILM COATED ORAL
Qty: 90 TABLET | Refills: 0 | Status: SHIPPED | OUTPATIENT
Start: 2019-03-25 | End: 2019-06-11 | Stop reason: SDUPTHER

## 2019-06-12 RX ORDER — ATORVASTATIN CALCIUM 20 MG/1
TABLET, FILM COATED ORAL
Qty: 90 TABLET | Refills: 0 | Status: SHIPPED | OUTPATIENT
Start: 2019-06-12 | End: 2019-06-13 | Stop reason: SDUPTHER

## 2019-06-13 DIAGNOSIS — I10 ESSENTIAL HYPERTENSION: ICD-10-CM

## 2019-06-13 DIAGNOSIS — F41.9 ANXIETY: ICD-10-CM

## 2019-06-13 DIAGNOSIS — N95.1 MENOPAUSAL SYMPTOMS: ICD-10-CM

## 2019-06-13 RX ORDER — VENLAFAXINE HYDROCHLORIDE 37.5 MG/1
37.5 CAPSULE, EXTENDED RELEASE ORAL DAILY
Qty: 90 CAPSULE | Refills: 0 | Status: SHIPPED | OUTPATIENT
Start: 2019-06-13 | End: 2019-09-07 | Stop reason: SDUPTHER

## 2019-06-13 RX ORDER — ATORVASTATIN CALCIUM 20 MG/1
TABLET, FILM COATED ORAL
Qty: 90 TABLET | Refills: 0 | Status: SHIPPED | OUTPATIENT
Start: 2019-06-13 | End: 2019-12-07 | Stop reason: SDUPTHER

## 2019-06-13 RX ORDER — HYDROCHLOROTHIAZIDE 25 MG/1
25 TABLET ORAL DAILY
Qty: 90 TABLET | Refills: 0 | Status: SHIPPED | OUTPATIENT
Start: 2019-06-13 | End: 2019-09-07 | Stop reason: SDUPTHER

## 2019-06-13 RX ORDER — LOSARTAN POTASSIUM 100 MG/1
TABLET ORAL
Qty: 90 TABLET | Refills: 0 | Status: SHIPPED | OUTPATIENT
Start: 2019-06-13 | End: 2019-09-07 | Stop reason: SDUPTHER

## 2019-06-13 NOTE — TELEPHONE ENCOUNTER
Pt needs a refill on her medication. She back in [facility] refills need to go to [facility] on [address], lipitor had been refilled to [facility]. Please cancel and send to [facility].       atorvastatin (LIPITOR) 20 MG tablet  hydrochlorothiazide (HYDRODIURIL) 25 MG tablet  losartan (COZAAR) 100 MG tablet  venlafaxine (EFFEXOR XR) 37.5 MG extended release capsule

## 2019-07-17 ENCOUNTER — OFFICE VISIT (OUTPATIENT)
Dept: FAMILY MEDICINE CLINIC | Age: 64
End: 2019-07-17
Payer: MEDICARE

## 2019-07-17 VITALS
SYSTOLIC BLOOD PRESSURE: 126 MMHG | WEIGHT: 200 LBS | DIASTOLIC BLOOD PRESSURE: 74 MMHG | OXYGEN SATURATION: 98 % | HEART RATE: 87 BPM | BODY MASS INDEX: 32.78 KG/M2

## 2019-07-17 DIAGNOSIS — Z91.81 AT HIGH RISK FOR FALLS: ICD-10-CM

## 2019-07-17 DIAGNOSIS — E78.5 HYPERLIPIDEMIA, UNSPECIFIED HYPERLIPIDEMIA TYPE: ICD-10-CM

## 2019-07-17 DIAGNOSIS — R29.898 WEAKNESS OF BOTH LOWER EXTREMITIES: ICD-10-CM

## 2019-07-17 DIAGNOSIS — R73.9 HYPERGLYCEMIA: ICD-10-CM

## 2019-07-17 DIAGNOSIS — E61.9 DEFICIENCY OF NUTRIENT ELEMENT: ICD-10-CM

## 2019-07-17 DIAGNOSIS — Z85.118 HISTORY OF LUNG CANCER: ICD-10-CM

## 2019-07-17 DIAGNOSIS — I10 ESSENTIAL HYPERTENSION: Primary | ICD-10-CM

## 2019-07-17 PROCEDURE — 4040F PNEUMOC VAC/ADMIN/RCVD: CPT | Performed by: INTERNAL MEDICINE

## 2019-07-17 PROCEDURE — 1090F PRES/ABSN URINE INCON ASSESS: CPT | Performed by: INTERNAL MEDICINE

## 2019-07-17 PROCEDURE — G8400 PT W/DXA NO RESULTS DOC: HCPCS | Performed by: INTERNAL MEDICINE

## 2019-07-17 PROCEDURE — 4004F PT TOBACCO SCREEN RCVD TLK: CPT | Performed by: INTERNAL MEDICINE

## 2019-07-17 PROCEDURE — 3017F COLORECTAL CA SCREEN DOC REV: CPT | Performed by: INTERNAL MEDICINE

## 2019-07-17 PROCEDURE — 99213 OFFICE O/P EST LOW 20 MIN: CPT | Performed by: INTERNAL MEDICINE

## 2019-07-17 PROCEDURE — 1123F ACP DISCUSS/DSCN MKR DOCD: CPT | Performed by: INTERNAL MEDICINE

## 2019-07-17 PROCEDURE — G8427 DOCREV CUR MEDS BY ELIG CLIN: HCPCS | Performed by: INTERNAL MEDICINE

## 2019-07-17 PROCEDURE — G8417 CALC BMI ABV UP PARAM F/U: HCPCS | Performed by: INTERNAL MEDICINE

## 2019-07-17 ASSESSMENT — ENCOUNTER SYMPTOMS
BACK PAIN: 1
NAUSEA: 0
DIARRHEA: 0
ABDOMINAL PAIN: 0
SINUS PRESSURE: 0
SHORTNESS OF BREATH: 0
CONSTIPATION: 0
CHEST TIGHTNESS: 0
VOMITING: 0
COUGH: 0

## 2019-07-17 ASSESSMENT — PATIENT HEALTH QUESTIONNAIRE - PHQ9
1. LITTLE INTEREST OR PLEASURE IN DOING THINGS: 0
SUM OF ALL RESPONSES TO PHQ QUESTIONS 1-9: 0
2. FEELING DOWN, DEPRESSED OR HOPELESS: 0
SUM OF ALL RESPONSES TO PHQ QUESTIONS 1-9: 0
SUM OF ALL RESPONSES TO PHQ9 QUESTIONS 1 & 2: 0

## 2019-07-17 NOTE — PROGRESS NOTES
abused: Not on file     Physically abused: Not on file     Forced sexual activity: Not on file   Other Topics Concern    Not on file   Social History Narrative    Not on file        Family History   Problem Relation Age of Onset    Cancer Mother         thyroid    High Cholesterol Mother     Heart Disease Sister        /74 (Site: Left Upper Arm, Position: Sitting, Cuff Size: Large Adult)   Pulse 87   Wt 200 lb (90.7 kg)   SpO2 98%   BMI 32.78 kg/m²     Estimated body mass index is 32.78 kg/m² as calculated from the following:    Height as of 7/26/18: 5' 5.5\" (1.664 m). Weight as of this encounter: 200 lb (90.7 kg). Physical Exam   Constitutional: She is oriented to person, place, and time. She appears well-developed and well-nourished. No distress. HENT:   Head: Normocephalic and atraumatic. Right Ear: External ear normal.   Left Ear: External ear normal.   Mouth/Throat: Oropharynx is clear and moist.   Eyes: Pupils are equal, round, and reactive to light. Conjunctivae and EOM are normal.   Neck: Normal range of motion. No carotid bruits  Neg austin hallpike   Cardiovascular: Normal rate, regular rhythm, normal heart sounds and intact distal pulses. Exam reveals no gallop and no friction rub. No murmur heard. Pulmonary/Chest: Effort normal and breath sounds normal. No respiratory distress. She has no wheezes. She has no rales. She exhibits no tenderness. Abdominal: Soft. Bowel sounds are normal. She exhibits no distension. There is no tenderness. There is no rebound and no guarding. Musculoskeletal: Normal range of motion. She exhibits no edema. Mild tenderness palpation of lumbar spinous processes. Discomfort and weakness with subjective leg cramping in bilateral quads with straight leg raise. Symptoms more pronounced on right. Minimal tenderness to palpation bilateral greater trochanters   Lymphadenopathy:     She has no cervical adenopathy.    Neurological: She is alert and oriented to person, place, and time. No cranial nerve deficit. Skin: Skin is warm and dry. No rash noted. She is not diaphoretic. Psychiatric: She has a normal mood and affect. Her behavior is normal. Judgment and thought content normal.   Vitals reviewed. ASSESSMENT/PLAN:  Medhat Gunter was seen today for new patient. Diagnoses and all orders for this visit:    Essential hypertension  -     Comprehensive Metabolic Panel; Future   -At goal, continue current regimen    At high risk for falls   -Likely PT referral based on results of imaging for balance training and lower extremity strengthening. I do not think trochanteric bursitis is contributing significantly to her symptoms, I think she has significant hip girdle muscle weakness and dysfunction. We will look at plain films to eval for any structural deficits, but I think this is more myofascial dysfunction. Weakness of both lower extremities  -     XR LUMBAR SPINE (MIN 4 VIEWS); Future   -Check plain films for evidence of hardware failure    History of lung cancer  -     CT CHEST W WO CONTRAST; Future   -Overdue for surveillance screening    Hyperlipidemia, unspecified hyperlipidemia type  -     Lipid Panel; Future    Hyperglycemia  -     Hemoglobin A1C; Future    Deficiency of nutrient element  -     Vitamin B12 & Folate; Future    Other orders  -     zoster recombinant adjuvanted vaccine [facility]) 50 MCG/0.5ML SUSR injection;  Inject 0.5 mLs into the muscle See Admin Instructions 1 dose now and repeat in 2-6 months      Current Outpatient Medications   Medication Sig Dispense Refill    zoster recombinant adjuvanted vaccine (SHINGRIX) 50 MCG/0.5ML SUSR injection Inject 0.5 mLs into the muscle See Admin Instructions 1 dose now and repeat in 2-6 months 0.5 mL 0    atorvastatin (LIPITOR) 20 MG tablet TAKE 1 TABLET BY MOUTH DAILY AT BEDTIME 90 tablet 0    losartan (COZAAR) 100 MG tablet TAKE 1 TABLET BY MOUTH EVERY DAY 90 tablet 0    venlafaxine (EFFEXOR XR) 37.5 MG extended release capsule Take 1 capsule by mouth daily 90 capsule 0    hydrochlorothiazide (HYDRODIURIL) 25 MG tablet Take 1 tablet by mouth daily 90 tablet 0    folic acid (FOLVITE) 384 MCG tablet Take 400 mcg by mouth daily      vitamin B-12 (CYANOCOBALAMIN) 1000 MCG tablet Take 1,000 mcg by mouth daily       No current facility-administered medications for this visit. Health Maintenance Due   Topic Date Due    Hepatitis C screen  1955    HIV screen  12/07/1968    Shingles Vaccine (1 of 2) 12/07/2003    Colon cancer screen colonoscopy  12/07/2003    DEXA (modify frequency per FRAX score)  12/07/2018    Pneumococcal 65+ years Vaccine (1 of 2 - PCV13) 12/07/2018    Potassium monitoring  07/30/2019    Creatinine monitoring  07/30/2019     No follow-ups on file. An  electronic signature was used to authenticate this note. -- Flora Vieira MD on 7/17/2019 at 2:28 PM  On the basis of positive falls risk screening, assessment and plan is as follows: Likely PT referral based on imaging.

## 2019-07-31 ENCOUNTER — HOSPITAL ENCOUNTER (OUTPATIENT)
Dept: CT IMAGING | Age: 64
Discharge: HOME | End: 2019-07-31
Payer: MEDICARE

## 2019-07-31 ENCOUNTER — HOSPITAL ENCOUNTER (OUTPATIENT)
Dept: GENERAL RADIOLOGY | Age: 64
Discharge: HOME | End: 2019-07-31
Payer: MEDICARE

## 2019-07-31 ENCOUNTER — HOSPITAL ENCOUNTER (OUTPATIENT)
Age: 64
Discharge: HOME | End: 2019-07-31
Payer: MEDICARE

## 2019-07-31 DIAGNOSIS — E78.5 HYPERLIPIDEMIA, UNSPECIFIED HYPERLIPIDEMIA TYPE: ICD-10-CM

## 2019-07-31 DIAGNOSIS — Z85.118 HISTORY OF LUNG CANCER: ICD-10-CM

## 2019-07-31 DIAGNOSIS — R29.898 WEAKNESS OF BOTH LOWER EXTREMITIES: ICD-10-CM

## 2019-07-31 DIAGNOSIS — R73.9 HYPERGLYCEMIA: ICD-10-CM

## 2019-07-31 DIAGNOSIS — E61.9 DEFICIENCY OF NUTRIENT ELEMENT: ICD-10-CM

## 2019-07-31 DIAGNOSIS — I10 ESSENTIAL HYPERTENSION: ICD-10-CM

## 2019-07-31 LAB
A/G RATIO: 1.3 (ref 1.1–2.2)
ALBUMIN SERPL-MCNC: 4.3 G/DL (ref 3.4–5)
ALP BLD-CCNC: 70 U/L (ref 40–129)
ALT SERPL-CCNC: 26 U/L (ref 10–40)
ANION GAP SERPL CALCULATED.3IONS-SCNC: 15 MMOL/L (ref 3–16)
AST SERPL-CCNC: 22 U/L (ref 15–37)
BILIRUB SERPL-MCNC: 0.5 MG/DL (ref 0–1)
BUN BLDV-MCNC: 11 MG/DL (ref 7–20)
CALCIUM SERPL-MCNC: 9.9 MG/DL (ref 8.3–10.6)
CHLORIDE BLD-SCNC: 100 MMOL/L (ref 99–110)
CHOLESTEROL, TOTAL: 170 MG/DL (ref 0–199)
CO2: 23 MMOL/L (ref 21–32)
CREAT SERPL-MCNC: 0.7 MG/DL (ref 0.6–1.2)
FOLATE: >20 NG/ML (ref 4.78–24.2)
GFR AFRICAN AMERICAN: >60
GFR NON-AFRICAN AMERICAN: >60
GLOBULIN: 3.4 G/DL
GLUCOSE BLD-MCNC: 86 MG/DL (ref 70–99)
HDLC SERPL-MCNC: 78 MG/DL (ref 40–60)
LDL CHOLESTEROL CALCULATED: 72 MG/DL
POTASSIUM SERPL-SCNC: 3.5 MMOL/L (ref 3.5–5.1)
SODIUM BLD-SCNC: 138 MMOL/L (ref 136–145)
TOTAL PROTEIN: 7.7 G/DL (ref 6.4–8.2)
TRIGL SERPL-MCNC: 99 MG/DL (ref 0–150)
VITAMIN B-12: 1682 PG/ML (ref 211–911)
VLDLC SERPL CALC-MCNC: 20 MG/DL

## 2019-07-31 PROCEDURE — 71270 CT THORAX DX C-/C+: CPT

## 2019-07-31 PROCEDURE — 80061 LIPID PANEL: CPT

## 2019-07-31 PROCEDURE — 82746 ASSAY OF FOLIC ACID SERUM: CPT

## 2019-07-31 PROCEDURE — 80053 COMPREHEN METABOLIC PANEL: CPT

## 2019-07-31 PROCEDURE — 6360000004 HC RX CONTRAST MEDICATION: Performed by: INTERNAL MEDICINE

## 2019-07-31 PROCEDURE — 36415 COLL VENOUS BLD VENIPUNCTURE: CPT

## 2019-07-31 PROCEDURE — 83036 HEMOGLOBIN GLYCOSYLATED A1C: CPT

## 2019-07-31 PROCEDURE — 82607 VITAMIN B-12: CPT

## 2019-07-31 PROCEDURE — 72110 X-RAY EXAM L-2 SPINE 4/>VWS: CPT

## 2019-07-31 RX ADMIN — IOPAMIDOL 75 ML: 755 INJECTION, SOLUTION INTRAVENOUS at 16:25

## 2019-08-01 LAB
ESTIMATED AVERAGE GLUCOSE: 116.9 MG/DL
HBA1C MFR BLD: 5.7 %

## 2019-08-08 DIAGNOSIS — R29.898 WEAKNESS OF BOTH LOWER EXTREMITIES: Primary | ICD-10-CM

## 2019-08-08 DIAGNOSIS — Z91.81 AT HIGH RISK FOR FALLS: ICD-10-CM

## 2019-08-29 ENCOUNTER — HOSPITAL ENCOUNTER (OUTPATIENT)
Dept: PHYSICAL THERAPY | Age: 64
Setting detail: THERAPIES SERIES
Discharge: HOME | End: 2019-08-29
Payer: MEDICARE

## 2019-08-29 PROCEDURE — 97161 PT EVAL LOW COMPLEX 20 MIN: CPT

## 2019-08-29 PROCEDURE — 97530 THERAPEUTIC ACTIVITIES: CPT

## 2019-08-29 ASSESSMENT — PAIN DESCRIPTION - FREQUENCY: FREQUENCY: CONTINUOUS

## 2019-08-29 ASSESSMENT — PAIN DESCRIPTION - DESCRIPTORS: DESCRIPTORS: SHARP

## 2019-08-29 ASSESSMENT — PAIN SCALES - GENERAL: PAINLEVEL_OUTOF10: 6

## 2019-08-29 ASSESSMENT — PAIN DESCRIPTION - ORIENTATION: ORIENTATION: RIGHT;LEFT;LOWER

## 2019-08-29 ASSESSMENT — PAIN DESCRIPTION - LOCATION: LOCATION: HIP;BACK

## 2019-08-29 ASSESSMENT — PAIN DESCRIPTION - PROGRESSION: CLINICAL_PROGRESSION: NOT CHANGED

## 2019-08-29 NOTE — FLOWSHEET NOTE
Gait Re-education- Provided training and instruction to the patient for proper LE, core and proximal hip recruitment and positioning and eccentric body weight control with ambulation re-education including up and down stairs     Home Management Training / Self Care:  [] (28835) Provided self-care/home management training related to activities of daily living and compensatory training, and/or use of adaptive equipment for improvement with: ADLs and compensatory training, meal preparation, safety procedures and instruction in use of adaptive equipment, including bathing, grooming, dressing, personal hygiene, basic household cleaning and chores.      Home Exercise Program:    [x] (01915) Reviewed/Progressed HEP activities related to strengthening, flexibility, endurance, ROM of   [] LE / Lumbar: core, proximal hip and LE for functional self-care, mobility, lifting and ambulation/stair navigation   [] UE / Cervical: cervical, postural, scapular, scapulothoracic and UE control with self care, reaching, carrying, lifting, house/yardwork, driving, computer work  [] (39677)Reviewed/Progressed HEP activities related to improving balance, coordination, kinesthetic sense, posture, motor skill, proprioception of   [] LE: core, proximal hip and LE for self care, mobility, lifting, and ambulation/stair navigation    [] UE / Cervical: cervical, postural,  scapular, scapulothoracic and UE control with self care, reaching, carrying, lifting, house/yardwork, driving, computer work    Manual Treatments:  PROM / STM / Oscillations-Mobs:  G-I, II, III, IV (PA's, Inf., Post.)  [] (46472) Provided manual therapy to mobilize LE, proximal hip and/or LS spine soft tissue/joints for the purpose of modulating pain, promoting relaxation,  increasing ROM, reducing/eliminating soft tissue swelling/inflammation/restriction, improving soft tissue extensibility and allowing for proper ROM for normal function with   [] LE / lumbar: self care, Limitations/Impairments:  []Sleeping []Sitting               [x]Standing [x]Transfers        [x]Walking []Kneeling               [x]Stairs [x]Squatting / bending   [x]ADLs []Reaching  [x]Lifting  []Housework  []Driving []Job related tasks  []Sports/Recreation []Other:        ASSESSMENT:  See eval  Treatment/Activity Tolerance:  [] Patient able to complete tx [] Patient limited by fatigue  [] Patient limited by pain  [] Patient limited by other medical complications  [] Other:     Prognosis: [x] Good [] Fair  [] Poor    Patient Requires Follow-up: [x] Yes  [] No    PLAN: See eval. PT 2x / week for 6 weeks.    [] Continue per plan of care [] Alter current plan (see comments)  [x] Plan of care initiated [] Hold pending MD visit [] Discharge    Electronically signed by: Mane Lam PT, DPT

## 2019-08-29 NOTE — PROGRESS NOTES
Frequency: Continuous  Clinical Progression: Not changed  Vital Signs  Patient Currently in Pain: Yes    Vision/Hearing  Vision  Vision: Within Functional Limits  Hearing  Hearing: Within functional limits    Orientation  Orientation  Overall Orientation Status: Within Normal Limits    Social/Functional History  Social/Functional History  Lives With: Significant other  Type of Home: House  Home Layout: One level(with a finished basement. stairs are difficult for pt, must hang onto railings. )  ADL Assistance: [address]: ( completes cooking, pt completes cleaning. )  Homemaking Responsibilities: Yes  Ambulation Assistance: Independent  Transfer Assistance: Independent  Active : Yes  Occupation: Retired  Type of occupation: Pt was a . Objective     Strength RLE  R Hip Flexion: 3/5(cramping on the quad during trial. )  R Hip Extension: 3+/5  R Hip ABduction: 4/5  R Hip Internal Rotation: 5/5  R Hip External Rotation: 5/5  R Knee Flexion: 4+/5  R Knee Extension: 4+/5(cramping in the quad during trial.)  Strength LLE  L Hip Flexion: 3+/5  L Hip Extension: 3+/5  L Hip ABduction: 4-/5  L Hip Internal Rotation: 5/5  L Hip External Rotation: 5/5  L Knee Flexion: 4+/5  L Knee Extension: 4+/5  Strength Other  Other: FUCNTIONAL STRENGTH: Pt able to complete 5 STS transfers in 30 sec without UE assist.            Bed mobility  Bridging: Independent  Rolling to Left: Independent  Rolling to Right: Independent  Supine to Sit: Independent  Sit to Supine: Independent  Scooting: Independent  Comment: all bed mobility requires increased time and effort. Ambulation  Ambulation?: Yes  Ambulation 1  Device: No Device  Assistance: Independent  Gait Deviations: Slow Floresita; Increased YOLIE; Decreased step height;Decreased arm swing  Comments: 6 min walk test: 709 ft with 2 seated rest breaks.   Reports of back pain and hip pain during trial.    Balance  Posture: Good  Sitting - Static: Good  Sitting - Dynamic: Good  Standing - Static: Good  Standing - Dynamic: Good;-  Tandem Stance R Le  Tandem Stance L Le  Single Leg Stance R Le(knee bending, difficulty maintaining upright posture. )  Single Leg Stance L Le  Foam Surface  Eyes Open: 30 seconds  Sway: Moderate  Strategy: Hip  Eyes Closed: 20 seconds  Sway: Moderate  Strategy: Hip       Assessment   Conditions Requiring Skilled Therapeutic Intervention  Body structures, Functions, Activity limitations: Decreased functional mobility ; Decreased strength;Decreased endurance; Increased Pain  Assessment: Pt is a 70 yr old female presenting with decreased balance, weakness, B hip pain, and back pain. Upon evaluation pt demo decreased balance, gait impairments, decreased strength, and signs and symptoms consistent with greater trochanteric bursitis. Pt would benefit from skilled physical therapy to address these impairmetns and allow pt to return to [facility]. Treatment Diagnosis: decreased core and B LE strength, decreased balance, decreased fucntional mobility, decreased ambulation tolerance.    Prognosis: Good  Decision Making: Low Complexity  REQUIRES PT FOLLOW UP: Yes         Plan   Plan  Times per week: 2  Times per day: Daily  Plan weeks: 6  Current Treatment Recommendations: Strengthening, Balance Training, Functional Mobility Training, Transfer Training, Endurance Training, Stair training, Gait Training, Manual Therapy - Joint Manipulation, Neuromuscular Re-education, Manual Therapy - Soft Tissue Mobilization, Home Exercise Program, Pain Management, Modalities    OutComes Score  LEFS Total Score: 17 (19 [phone])        Goals  Long term goals  Time Frame for Long term goals : 6 weeks  Long term goal 1: Pt will improve functional strength as seen in the ability complete 8 STS transfers in 30 seconds without UE assist.   Long term goal 2: Pt will ambulate 1000' in 6 min wihout AD for improved ambulation

## 2019-09-07 DIAGNOSIS — F41.9 ANXIETY: ICD-10-CM

## 2019-09-07 DIAGNOSIS — I10 ESSENTIAL HYPERTENSION: ICD-10-CM

## 2019-09-07 DIAGNOSIS — N95.1 MENOPAUSAL SYMPTOMS: ICD-10-CM

## 2019-09-09 ENCOUNTER — HOSPITAL ENCOUNTER (OUTPATIENT)
Dept: PHYSICAL THERAPY | Age: 64
Setting detail: THERAPIES SERIES
Discharge: HOME | End: 2019-09-09
Payer: MEDICARE

## 2019-09-09 PROCEDURE — 97140 MANUAL THERAPY 1/> REGIONS: CPT

## 2019-09-09 PROCEDURE — 97110 THERAPEUTIC EXERCISES: CPT

## 2019-09-09 RX ORDER — HYDROCHLOROTHIAZIDE 25 MG/1
25 TABLET ORAL DAILY
Qty: 90 TABLET | Refills: 1 | Status: SHIPPED | OUTPATIENT
Start: 2019-09-09 | End: 2020-03-02 | Stop reason: SINTOL

## 2019-09-09 RX ORDER — LOSARTAN POTASSIUM 100 MG/1
TABLET ORAL
Qty: 90 TABLET | Refills: 1 | Status: SHIPPED | OUTPATIENT
Start: 2019-09-09 | End: 2020-03-02 | Stop reason: SDUPTHER

## 2019-09-09 RX ORDER — VENLAFAXINE HYDROCHLORIDE 37.5 MG/1
37.5 CAPSULE, EXTENDED RELEASE ORAL DAILY
Qty: 90 CAPSULE | Refills: 1 | Status: SHIPPED | OUTPATIENT
Start: 2019-09-09 | End: 2020-03-02 | Stop reason: SDUPTHER

## 2019-09-09 NOTE — FLOWSHEET NOTE
Long term goal 4: Pt will  be independent with HEP for improved long term health. Long term goal 5: Pt will improve strength in B LE to at least 4/5 in all planes for improved fucntional mobility. Patient Goals   Patient goals : Pt would like to build strength and be able to walk longer. Progression Towards Functional goals:  [] Patient is progressing as expected towards functional goals listed. [] Progression is slowed due to complexities listed. [] Progression has been slowed due to co-morbidities. [x] Plan just implemented, too soon to assess goals progression  [] Other:     Persisting Functional Limitations/Impairments:  []Sleeping []Sitting               [x]Standing [x]Transfers        [x]Walking []Kneeling               [x]Stairs [x]Squatting / bending   [x]ADLs []Reaching  [x]Lifting  []Housework  []Driving []Job related tasks  []Sports/Recreation []Other:        ASSESSMENT:    Treatment/Activity Tolerance:  [x] Patient able to complete tx  [] Patient limited by fatigue  [] Patient limited by pain  [] Patient limited by other medical complications  [x] Other: continue to check alignment and focus on ext of lumb     Prognosis: [x] Good [] Fair  [] Poor    Patient Requires Follow-up: [x] Yes  [] No    PLAN: See eval. PT 2x / week for 6 weeks.    [] Continue per plan of care [] Alter current plan (see comments)  [x] Plan of care initiated [] Hold pending MD visit [] Discharge    Electronically signed by: Dalila Munguia PT, DPT

## 2019-09-12 ENCOUNTER — HOSPITAL ENCOUNTER (OUTPATIENT)
Dept: PHYSICAL THERAPY | Age: 64
Setting detail: THERAPIES SERIES
Discharge: HOME | End: 2019-09-12
Payer: MEDICARE

## 2019-09-12 PROCEDURE — 97140 MANUAL THERAPY 1/> REGIONS: CPT

## 2019-09-12 PROCEDURE — 97110 THERAPEUTIC EXERCISES: CPT

## 2019-09-12 NOTE — FLOWSHEET NOTE
Intervention (80595)       therastick to R piriformis, glute, HS   X 5 mins    L leg pull for L upslip   X 3 mins    PA mobs to L sacral GISSELLE to correct LOL rotation                                Pt. Education:  -patient educated on diagnosis, prognosis and expectations for rehab  -all patient questions were answered     HEP instruction:  9/9: added PPT, prone press up, bridge, issued HO   -patient provided with written and illustrated instructions for HEP (see scanned image in )    Therapeutic Exercise and NMR EXR  [x] (67042) Provided verbal/tactile cueing for activities related to strengthening, flexibility, endurance, ROM for improvements in  [x] LE / Lumbar: LE, proximal hip, and core control with self care, mobility, lifting, ambulation. [] UE / Cervical: cervical, postural, scapular, scapulothoracic and UE control with self care, reaching, carrying, lifting, house/yardwork, driving, computer work.  [] (47695) Provided verbal/tactile cueing for activities related to improving balance, coordination, kinesthetic sense, posture, motor skill, proprioception to assist with   [] LE / lumbar: LE, proximal hip, and core control in self care, mobility, lifting, ambulation and eccentric single leg control. [] UE / cervical: cervical, scapular, scapulothoracic and UE control with self care, reaching, carrying, lifting, house/yardwork, driving, computer work.   [] (06922) Therapist is in constant attendance of 2 or more patients providing skilled therapy interventions, but not providing any significant amount of measurable one-on-one time to either patient, for improvements in  [] LE / lumbar: LE, proximal hip, and core control in self care, mobility, lifting, ambulation and eccentric single leg control. [] UE / cervical: cervical, scapular, scapulothoracic and UE control with self care, reaching, carrying, lifting, house/yardwork, driving, computer work.      NMR and Therapeutic Activities:    [] (83009 or 20060) Provided verbal/tactile cueing for activities related to improving balance, coordination, kinesthetic sense, posture, motor skill, proprioception and motor activation to allow for proper function of   [] LE: / Lumbar core, proximal hip and LE with self care and ADLs  [] UE / Cervical: cervical, postural, scapular, scapulothoracic and UE control with self care, carrying, lifting, driving, computer work.   [] (74073) Gait Re-education- Provided training and instruction to the patient for proper LE, core and proximal hip recruitment and positioning and eccentric body weight control with ambulation re-education including up and down stairs     Home Management Training / Self Care:  [] (28139) Provided self-care/home management training related to activities of daily living and compensatory training, and/or use of adaptive equipment for improvement with: ADLs and compensatory training, meal preparation, safety procedures and instruction in use of adaptive equipment, including bathing, grooming, dressing, personal hygiene, basic household cleaning and chores.      Home Exercise Program:    [x] (91430) Reviewed/Progressed HEP activities related to strengthening, flexibility, endurance, ROM of   [x] LE / Lumbar: core, proximal hip and LE for functional self-care, mobility, lifting and ambulation/stair navigation   [] UE / Cervical: cervical, postural, scapular, scapulothoracic and UE control with self care, reaching, carrying, lifting, house/yardwork, driving, computer work  [] (10476)Reviewed/Progressed HEP activities related to improving balance, coordination, kinesthetic sense, posture, motor skill, proprioception of   [] LE: core, proximal hip and LE for self care, mobility, lifting, and ambulation/stair navigation    [] UE / Cervical: cervical, postural,  scapular, scapulothoracic and UE control with self care, reaching, carrying, lifting, house/yardwork, driving, computer work    Manual Treatments:  PROM /

## 2019-09-16 ENCOUNTER — HOSPITAL ENCOUNTER (OUTPATIENT)
Dept: PHYSICAL THERAPY | Age: 64
Setting detail: THERAPIES SERIES
Discharge: HOME | End: 2019-09-16
Payer: MEDICARE

## 2019-09-16 PROCEDURE — 97110 THERAPEUTIC EXERCISES: CPT

## 2019-09-16 PROCEDURE — 97530 THERAPEUTIC ACTIVITIES: CPT

## 2019-09-16 NOTE — FLOWSHEET NOTE
[facility] - Outpatient Physical Therapy    Physical Therapy Daily Treatment Note  Date:  2019    Patient Name:  Luís Mason    :  1955  MRN: [id number]  Medical/Treatment Diagnosis Information:  · Diagnosis: R29.898 (ICD-10-CM) - Weakness of both lower extremities, Z91.81 (ICD-10-CM) - At high risk for falls  · Treatment Diagnosis: decreased core and B LE strength, decreased balance, decreased fucntional mobility, decreased ambulation tolerance. Insurance/Certification information:  PT Insurance Information: medicare-primary, [facility]-secondary  Physician Information:  Referring Practitioner: Davida Hardin MD  Plan of care signed (Y/N): Yes    Date of Patient follow up with Physician:      Functional scale[de-identified] LEFS, 30 sec STS, 6 min walk    Progress Note: []  Yes  [x]  No  Next due by: Visit #10       Latex Allergy:  [x]NO      []YES  Preferred Language for Healthcare:   [x]English       []other:    Visit # Insurance Allowable Date Range (if applicable)    Medicare guidelines N/A     Pain level: 0 /10     SUBJECTIVE:  Pt reports she had to park far away and could feel it in both hips once she got to the waiting room. R hip tight today. OBJECTIVE:   : positive GENARO, thrust test, and SLR on R indicating SIJ involvement       RESTRICTIONS/PRECAUTIONS: No lumbar traction, history of spinal fusion.      Exercises/Interventions:     Therapeutic Exercises (65116) Resistance / level Sets/sec Reps Notes   Bike/Nustep  Step one    Level 1   X 4 mins  Seat 6  Seat 15   HSS/HFS  20 sec  x2 each     Step ups B UE support    Lateral band walks       Superman with opp UE/LE lifts       Prone press up       bridge      IB;HR/TR  30 sec x2; x10 X 2    Squats with proper form   x10 B UE support                         Therapeutic Activities (59562)       STS from mod high mat table       lumb AROM - SB on wall Ext stretch  SBing stretch     Hip flexion from step 6 inch x1 related to improving balance, coordination, kinesthetic sense, posture, motor skill, proprioception of   [] LE: core, proximal hip and LE for self care, mobility, lifting, and ambulation/stair navigation    [] UE / Cervical: cervical, postural,  scapular, scapulothoracic and UE control with self care, reaching, carrying, lifting, house/yardwork, driving, computer work    Manual Treatments:  PROM / STM / Oscillations-Mobs:  G-I, II, III, IV (PA's, Inf., Post.)  [] (38101) Provided manual therapy to mobilize LE, proximal hip and/or LS spine soft tissue/joints for the purpose of modulating pain, promoting relaxation,  increasing ROM, reducing/eliminating soft tissue swelling/inflammation/restriction, improving soft tissue extensibility and allowing for proper ROM for normal function with   [] LE / lumbar: self care, mobility, lifting and ambulation. [] UE / Cervical: self care, reaching, carrying, lifting, house/yardwork, driving, computer work. Modalities:  [] (93513) Vasopneumatic compression: Utilized vasopneumatic compression to decrease edema / swelling for the purpose of improving mobility and quad tone / recruitment which will allow for increased overall function including but not limited to self-care, transfers, ambulation, and ascending / descending stairs.        Modalities:      Charges:  Timed Code Treatment Minutes: 34   Total Treatment Minutes: 34     [] EVAL - LOW (19016)   [] EVAL - MOD (06110)  [] EVAL - HIGH (22112)  [] RE-EVAL (60059)  [x] TE (83635) x 1     [] Ionto  [] NMR (32140) x      [] Vaso  [] Manual (65883) x     [] Ultrasound  [x] TA x 1      [] Mech Traction (17849)  [] Gait Training x     [] ES (un) (05256):   [] Aquatic therapy x   [] Other:   [] Group:     GOALS:   Long term goals  Time Frame for Long term goals : 6 weeks  Long term goal 1: Pt will improve functional strength as seen in the ability complete 8 STS transfers in 30 seconds without UE assist.   Long term goal 2: Pt will ambulate 1000' in 6 min wihout AD for improved ambulation tolerance. Long term goal 3: Pt will report pain decreased for 2/10 or less in B hips and back for improved activity tolerance. Long term goal 4: Pt will  be independent with HEP for improved long term health. Long term goal 5: Pt will improve strength in B LE to at least 4/5 in all planes for improved fucntional mobility. Patient Goals   Patient goals : Pt would like to build strength and be able to walk longer. Progression Towards Functional goals:  [] Patient is progressing as expected towards functional goals listed. [] Progression is slowed due to complexities listed. [] Progression has been slowed due to co-morbidities. [x] Plan just implemented, too soon to assess goals progression  [] Other:     Persisting Functional Limitations/Impairments:  []Sleeping []Sitting               [x]Standing [x]Transfers        [x]Walking []Kneeling               [x]Stairs [x]Squatting / bending   [x]ADLs []Reaching  [x]Lifting  []Housework  []Driving []Job related tasks  []Sports/Recreation []Other:        ASSESSMENT:    Treatment/Activity Tolerance:  [x] Patient able to complete tx  [] Patient limited by fatigue  [] Patient limited by pain  [] Patient limited by other medical complications  [x] Other: weakness in B hips, has a mental block ascending stairs with R leading without UE support - focus on stair climbing in future sessions     Prognosis: [x] Good [] Fair  [] Poor    Patient Requires Follow-up: [x] Yes  [] No    PLAN: See eval. PT 2x / week for 6 weeks.    [x] Continue per plan of care [] Alter current plan (see comments)  [] Plan of care initiated [] Hold pending MD visit [] Discharge    Electronically signed by: She Tamayo PT, DPT

## 2019-09-19 ENCOUNTER — HOSPITAL ENCOUNTER (OUTPATIENT)
Dept: PHYSICAL THERAPY | Age: 64
Setting detail: THERAPIES SERIES
Discharge: HOME | End: 2019-09-19
Payer: MEDICARE

## 2019-09-19 PROCEDURE — 97530 THERAPEUTIC ACTIVITIES: CPT

## 2019-09-19 PROCEDURE — 97110 THERAPEUTIC EXERCISES: CPT

## 2019-09-19 NOTE — FLOWSHEET NOTE
[facility] - Outpatient Physical Therapy    Physical Therapy Daily Treatment Note  Date:  2019    Patient Name:  David Camacho    :  1955  MRN: [id number]  Medical/Treatment Diagnosis Information:  · Diagnosis: R29.898 (ICD-10-CM) - Weakness of both lower extremities, Z91.81 (ICD-10-CM) - At high risk for falls  · Treatment Diagnosis: decreased core and B LE strength, decreased balance, decreased fucntional mobility, decreased ambulation tolerance. Insurance/Certification information:  PT Insurance Information: medicare-primary, [facility]-secondary  Physician Information:  Referring Practitioner: Saul Conteh MD  Plan of care signed (Y/N): Yes    Date of Patient follow up with Physician:      Functional scale[de-identified] LEFS, 30 sec STS, 6 min walk    Progress Note: []  Yes  [x]  No  Next due by: Visit #10       Latex Allergy:  [x]NO      []YES  Preferred Language for Healthcare:   [x]English       []other:    Visit # Insurance Allowable Date Range (if applicable)   0/50 Medicare guidelines N/A     Pain level: 0 /10     SUBJECTIVE:  Pt reports that she doesn't have much pain today because she hasn't done much today. OBJECTIVE:   : positive GENARO, thrust test, and SLR on R indicating SIJ involvement       RESTRICTIONS/PRECAUTIONS: No lumbar traction, history of spinal fusion.      Exercises/Interventions:     Therapeutic Exercises (48259) Resistance / level Sets/sec Reps Notes   Bike/Nustep  Step one    Level 1   X 4 mins  Seat 6  Seat 15   HSS/HFS  20 sec  x2 each     Step ups B UE support    Lateral band walks       Superman with opp UE/LE lifts       Prone press up       bridge      IB;HR/TR  30 sec x2; x10 X 2    Squats with proper form   x10 B UE support                         Therapeutic Activities (68718)       STS from mod high mat table       lumb AROM - SB on wall Ext stretch  SBing stretch     Hip flexion from step 6 inch x1 x10 B No UE support   Lateral dips 6 inch x1 x10 B 1 UE support to none   Forward step downs  6 inch x1 x10 B B UE support    lumb flexion and ext stretch Ballet barre 10 sec 4 each  B UE support    Retro gliders  x1 x10 B B UE support           Neuromuscular Re-ed (85248)       Cone taps       Wobble board       Tandem on airex        Step ups on shuttle   x1 x10 B UE support                  Manual Intervention (48546)       therastick to R piriformis, glute, HS   X 3 mins   L leg pull for L upslip      PA mobs to L sacral GISSELLE to correct LOL rotation                                Pt. Education:  -patient educated on diagnosis, prognosis and expectations for rehab  -all patient questions were answered     HEP instruction:  9/9: added PPT, prone press up, bridge, issued HO   -patient provided with written and illustrated instructions for HEP (see scanned image in )    Therapeutic Exercise and NMR EXR  [x] (54343) Provided verbal/tactile cueing for activities related to strengthening, flexibility, endurance, ROM for improvements in  [x] LE / Lumbar: LE, proximal hip, and core control with self care, mobility, lifting, ambulation. [] UE / Cervical: cervical, postural, scapular, scapulothoracic and UE control with self care, reaching, carrying, lifting, house/yardwork, driving, computer work.  [] (08209) Provided verbal/tactile cueing for activities related to improving balance, coordination, kinesthetic sense, posture, motor skill, proprioception to assist with   [] LE / lumbar: LE, proximal hip, and core control in self care, mobility, lifting, ambulation and eccentric single leg control.    [] UE / cervical: cervical, scapular, scapulothoracic and UE control with self care, reaching, carrying, lifting, house/yardwork, driving, computer work.   [] (46941) Therapist is in constant attendance of 2 or more patients providing skilled therapy interventions, but not providing any significant amount of measurable one-on-one time to either patient, for improvements in  [] LE / lumbar: LE, proximal hip, and core control in self care, mobility, lifting, ambulation and eccentric single leg control. [] UE / cervical: cervical, scapular, scapulothoracic and UE control with self care, reaching, carrying, lifting, house/yardwork, driving, computer work. NMR and Therapeutic Activities:    [x] (32527 or 75021) Provided verbal/tactile cueing for activities related to improving balance, coordination, kinesthetic sense, posture, motor skill, proprioception and motor activation to allow for proper function of   [x] LE: / Lumbar core, proximal hip and LE with self care and ADLs  [] UE / Cervical: cervical, postural, scapular, scapulothoracic and UE control with self care, carrying, lifting, driving, computer work.   [] (57800) Gait Re-education- Provided training and instruction to the patient for proper LE, core and proximal hip recruitment and positioning and eccentric body weight control with ambulation re-education including up and down stairs     Home Management Training / Self Care:  [] (04865) Provided self-care/home management training related to activities of daily living and compensatory training, and/or use of adaptive equipment for improvement with: ADLs and compensatory training, meal preparation, safety procedures and instruction in use of adaptive equipment, including bathing, grooming, dressing, personal hygiene, basic household cleaning and chores.      Home Exercise Program:    [] (95329) Reviewed/Progressed HEP activities related to strengthening, flexibility, endurance, ROM of   [] LE / Lumbar: core, proximal hip and LE for functional self-care, mobility, lifting and ambulation/stair navigation   [] UE / Cervical: cervical, postural, scapular, scapulothoracic and UE control with self care, reaching, carrying, lifting, house/yardwork, driving, computer work  [] (68213)Reviewed/Progressed HEP activities related to improving balance,

## 2019-09-23 ENCOUNTER — HOSPITAL ENCOUNTER (OUTPATIENT)
Dept: PHYSICAL THERAPY | Age: 64
Setting detail: THERAPIES SERIES
Discharge: HOME | End: 2019-09-23
Payer: MEDICARE

## 2019-09-23 PROCEDURE — 97110 THERAPEUTIC EXERCISES: CPT

## 2019-09-23 PROCEDURE — 97140 MANUAL THERAPY 1/> REGIONS: CPT

## 2019-09-23 NOTE — FLOWSHEET NOTE
from mod high mat table       lumb AROM - SB on wall Ext stretch  SBing stretch  10 sec  10 sec  x5  x5    Hip flexion from step 6 inch No UE support   Lateral dips 6 inch 1 UE support to none   Forward step downs  6 inch B UE support    lumb flexion and ext stretch Retro gliders  B UE support           Neuromuscular Re-ed (36979)       Cone taps       Wobble board       Tandem on airex        Step ups on shuttle   B UE support                  Manual Intervention (33128)       therastick to R piriformis, glute, HS      L leg pull for L upslip      PA mobs to L sacral GISSELLE to correct LOL rotation       Very gentle ball/belt traction, manual LTR   X 8 mins                      Pt. Education:  -patient educated on diagnosis, prognosis and expectations for rehab  -all patient questions were answered     HEP instruction:  9/9: added PPT, prone press up, bridge, issued HO   -patient provided with written and illustrated instructions for HEP (see scanned image in )    Therapeutic Exercise and NMR EXR  [x] (55277) Provided verbal/tactile cueing for activities related to strengthening, flexibility, endurance, ROM for improvements in  [x] LE / Lumbar: LE, proximal hip, and core control with self care, mobility, lifting, ambulation. [] UE / Cervical: cervical, postural, scapular, scapulothoracic and UE control with self care, reaching, carrying, lifting, house/yardwork, driving, computer work.  [] (90501) Provided verbal/tactile cueing for activities related to improving balance, coordination, kinesthetic sense, posture, motor skill, proprioception to assist with   [] LE / lumbar: LE, proximal hip, and core control in self care, mobility, lifting, ambulation and eccentric single leg control.    [] UE / cervical: cervical, scapular, scapulothoracic and UE control with self care, reaching, carrying, lifting, house/yardwork, driving, computer work.   [] (08244) Therapist is in constant attendance of 2 or more patients providing skilled therapy interventions, but not providing any significant amount of measurable one-on-one time to either patient, for improvements in  [] LE / lumbar: LE, proximal hip, and core control in self care, mobility, lifting, ambulation and eccentric single leg control. [] UE / cervical: cervical, scapular, scapulothoracic and UE control with self care, reaching, carrying, lifting, house/yardwork, driving, computer work. NMR and Therapeutic Activities:    [] (61981 or 44875) Provided verbal/tactile cueing for activities related to improving balance, coordination, kinesthetic sense, posture, motor skill, proprioception and motor activation to allow for proper function of   [] LE: / Lumbar core, proximal hip and LE with self care and ADLs  [] UE / Cervical: cervical, postural, scapular, scapulothoracic and UE control with self care, carrying, lifting, driving, computer work.   [] (51127) Gait Re-education- Provided training and instruction to the patient for proper LE, core and proximal hip recruitment and positioning and eccentric body weight control with ambulation re-education including up and down stairs     Home Management Training / Self Care:  [] (11945) Provided self-care/home management training related to activities of daily living and compensatory training, and/or use of adaptive equipment for improvement with: ADLs and compensatory training, meal preparation, safety procedures and instruction in use of adaptive equipment, including bathing, grooming, dressing, personal hygiene, basic household cleaning and chores.      Home Exercise Program:    [] (06479) Reviewed/Progressed HEP activities related to strengthening, flexibility, endurance, ROM of   [] LE / Lumbar: core, proximal hip and LE for functional self-care, mobility, lifting and ambulation/stair navigation   [] UE / Cervical: cervical, postural, scapular, scapulothoracic and UE control with self care, reaching, carrying,

## 2019-09-26 ENCOUNTER — HOSPITAL ENCOUNTER (OUTPATIENT)
Dept: PHYSICAL THERAPY | Age: 64
Setting detail: THERAPIES SERIES
Discharge: HOME | End: 2019-09-26
Payer: MEDICARE

## 2019-09-26 PROCEDURE — 97110 THERAPEUTIC EXERCISES: CPT

## 2019-09-26 PROCEDURE — 97140 MANUAL THERAPY 1/> REGIONS: CPT

## 2019-09-26 NOTE — FLOWSHEET NOTE
[facility] - Outpatient Physical Therapy    Physical Therapy Daily Treatment Note  Date:  2019    Patient Name:  Luís Mason    :  1955  MRN: [id number]  Medical/Treatment Diagnosis Information:  · Diagnosis: R29.898 (ICD-10-CM) - Weakness of both lower extremities, Z91.81 (ICD-10-CM) - At high risk for falls  · Treatment Diagnosis: decreased core and B LE strength, decreased balance, decreased fucntional mobility, decreased ambulation tolerance. Insurance/Certification information:  PT Insurance Information: medicare-primary, [facility]-secondary  Physician Information:  Referring Practitioner: Davida Hardin MD  Plan of care signed (Y/N): Yes    Date of Patient follow up with Physician:      Functional scale[de-identified] LEFS, 30 sec STS, 6 min walk    Progress Note: []  Yes  [x]  No  Next due by: Visit #10       Latex Allergy:  [x]NO      []YES  Preferred Language for Healthcare:   [x]English       []other:    Visit # Insurance Allowable Date Range (if applicable)   3/68 Medicare guidelines N/A     Pain level: 0 /10     SUBJECTIVE:  :  Pt with no new complaints this date; states she still just has issues when trying to walk far distances - pain in hips. States she has not seen an increase in the distance she can walk since starting PT. Reports ball and belt traction felt very good while it was performed - no significant relief the rest of the day per pt.       OBJECTIVE: :  Ambulating well this date while in clinic  : positive GENARO, thrust test, and SLR on R indicating SIJ involvement       RESTRICTIONS/PRECAUTIONS: No [facility] lumbar traction, history of spinal fusion    Exercises/Interventions:     Therapeutic Exercises (45037) Resistance / level Sets/sec Reps Notes   Bike/Nustep  Step one    Level 1   X 4 mins  Seat 6  Seat 15   HSS/HFS  20 sec  x2 each     Step ups B UE support    Lateral band walks       Superman with opp UE/LE lifts       Prone press up bridge      IB;HR/TR  30 sec x2; x10 X 2    Squats with proper form    B UE support    TG squats      TG single leg squats      HS curl with SB                           Therapeutic Activities (52110)       STS from mod high mat table       lumb AROM - SB on wall    Hip flexion from step 6 inch No UE support   Lateral dips 6 inch 1 UE support to none   Forward step downs  6 inch B UE support    lumb flexion and ext stretch Retro gliders  B UE support           Neuromuscular Re-ed (94154)       Cone taps       Wobble board       Tandem on airex        Step ups on shuttle   B UE support                  Manual Intervention (58394)       therastick to R piriformis, glute, HS      L leg pull for L upslip      PA mobs to L sacral GISSELLE to correct LOL rotation       Very gentle ball/belt traction, manual LTR       Supine for piriformis stretch B, hamstring stretch B and IT band stretch B X 10 min total      K-tape: 3 I-strips with 25%pull forming star over greater trochanter B X 6 min          Pt. Education: 9/26:  Pt educated on the use of K-tape, proper wear time and removal.  Advised to remove if skin irritation occurs. -patient educated on diagnosis, prognosis and expectations for rehab  -all patient questions were answered     HEP instruction:  9/26:  No new additions this date  9/9: added PPT, prone press up, bridge, issued HO   -patient provided with written and illustrated instructions for HEP (see scanned image in )    Therapeutic Exercise and NMR EXR  [x] (92644) Provided verbal/tactile cueing for activities related to strengthening, flexibility, endurance, ROM for improvements in  [x] LE / Lumbar: LE, proximal hip, and core control with self care, mobility, lifting, ambulation.   [] UE / Cervical: cervical, postural, scapular, scapulothoracic and UE control with self care, reaching, carrying, lifting, house/yardwork, driving, computer work.  [] (90471) Provided verbal/tactile cueing for activities related to improving balance, coordination, kinesthetic sense, posture, motor skill, proprioception to assist with   [] LE / lumbar: LE, proximal hip, and core control in self care, mobility, lifting, ambulation and eccentric single leg control. [] UE / cervical: cervical, scapular, scapulothoracic and UE control with self care, reaching, carrying, lifting, house/yardwork, driving, computer work.   [] (65343) Therapist is in constant attendance of 2 or more patients providing skilled therapy interventions, but not providing any significant amount of measurable one-on-one time to either patient, for improvements in  [] LE / lumbar: LE, proximal hip, and core control in self care, mobility, lifting, ambulation and eccentric single leg control. [] UE / cervical: cervical, scapular, scapulothoracic and UE control with self care, reaching, carrying, lifting, house/yardwork, driving, computer work.      NMR and Therapeutic Activities:    [] (63432 or 49487) Provided verbal/tactile cueing for activities related to improving balance, coordination, kinesthetic sense, posture, motor skill, proprioception and motor activation to allow for proper function of   [] LE: / Lumbar core, proximal hip and LE with self care and ADLs  [] UE / Cervical: cervical, postural, scapular, scapulothoracic and UE control with self care, carrying, lifting, driving, computer work.   [] (56033) Gait Re-education- Provided training and instruction to the patient for proper LE, core and proximal hip recruitment and positioning and eccentric body weight control with ambulation re-education including up and down stairs     Home Management Training / Self Care:  [] (15759) Provided self-care/home management training related to activities of daily living and compensatory training, and/or use of adaptive equipment for improvement with: ADLs and compensatory training, meal preparation, safety procedures and instruction in use of adaptive Total Treatment Minutes: 32     [] EVAL - LOW (70835)   [] EVAL - MOD (52484)  [] EVAL - HIGH (04842)  [] RE-EVAL (59917)  [x] TE (33097) x 1     [] Ionto  [] NMR (34705) x      [] Vaso  [x] Manual (31390) x 1     [] Ultrasound  [] TA x      [] Mech Traction (09826)  [] Gait Training x     [] ES (un) (94754):   [] Aquatic therapy x   [] Other:   [] Group:     GOALS:   Long term goals  Time Frame for Long term goals : 6 weeks  Long term goal 1: Pt will improve functional strength as seen in the ability complete 8 STS transfers in 30 seconds without UE assist.   Long term goal 2: Pt will ambulate 1000' in 6 min wihout AD for improved ambulation tolerance. Long term goal 3: Pt will report pain decreased for 2/10 or less in B hips and back for improved activity tolerance. Long term goal 4: Pt will  be independent with HEP for improved long term health. Long term goal 5: Pt will improve strength in B LE to at least 4/5 in all planes for improved fucntional mobility. Patient Goals   Patient goals : Pt would like to build strength and be able to walk longer. Progression Towards Functional goals:  [] Patient is progressing as expected towards functional goals listed. [] Progression is slowed due to complexities listed. [] Progression has been slowed due to co-morbidities.   [x] Plan just implemented, too soon to assess goals progression  [] Other:     Persisting Functional Limitations/Impairments:  []Sleeping []Sitting               [x]Standing [x]Transfers        [x]Walking []Kneeling               [x]Stairs [x]Squatting / bending   [x]ADLs []Reaching  [x]Lifting  []Housework  []Driving []Job related tasks  []Sports/Recreation []Other:        ASSESSMENT:  Pt tolerated session well this date  Treatment/Activity Tolerance:  [x] Patient able to complete tx  [] Patient limited by fatigue  [] Patient limited by pain  [] Patient limited by other medical complications  [x] Other: despite past lumb fusion, pt

## 2019-09-30 ENCOUNTER — HOSPITAL ENCOUNTER (OUTPATIENT)
Dept: PHYSICAL THERAPY | Age: 64
Setting detail: THERAPIES SERIES
Discharge: HOME | End: 2019-09-30
Payer: MEDICARE

## 2019-09-30 PROCEDURE — 97110 THERAPEUTIC EXERCISES: CPT

## 2019-09-30 PROCEDURE — 97140 MANUAL THERAPY 1/> REGIONS: CPT

## 2019-10-03 ENCOUNTER — HOSPITAL ENCOUNTER (OUTPATIENT)
Dept: PHYSICAL THERAPY | Age: 64
Setting detail: THERAPIES SERIES
Discharge: HOME | End: 2019-10-03
Payer: MEDICARE

## 2019-10-03 PROCEDURE — 97110 THERAPEUTIC EXERCISES: CPT

## 2019-10-03 PROCEDURE — 97140 MANUAL THERAPY 1/> REGIONS: CPT

## 2019-10-07 ENCOUNTER — HOSPITAL ENCOUNTER (OUTPATIENT)
Dept: PHYSICAL THERAPY | Age: 64
Setting detail: THERAPIES SERIES
Discharge: HOME | End: 2019-10-07
Payer: MEDICARE

## 2019-10-10 ENCOUNTER — HOSPITAL ENCOUNTER (OUTPATIENT)
Dept: PHYSICAL THERAPY | Age: 64
Setting detail: THERAPIES SERIES
Discharge: HOME | End: 2019-10-10
Payer: MEDICARE

## 2019-10-10 PROCEDURE — 97530 THERAPEUTIC ACTIVITIES: CPT

## 2019-10-10 PROCEDURE — 97110 THERAPEUTIC EXERCISES: CPT

## 2019-10-14 ENCOUNTER — HOSPITAL ENCOUNTER (OUTPATIENT)
Dept: PHYSICAL THERAPY | Age: 64
Setting detail: THERAPIES SERIES
Discharge: HOME | End: 2019-10-14
Payer: MEDICARE

## 2019-10-17 ENCOUNTER — HOSPITAL ENCOUNTER (OUTPATIENT)
Dept: PHYSICAL THERAPY | Age: 64
Setting detail: THERAPIES SERIES
Discharge: HOME | End: 2019-10-17
Payer: MEDICARE

## 2019-10-17 PROCEDURE — 97035 APP MDLTY 1+ULTRASOUND EA 15: CPT

## 2019-10-17 PROCEDURE — 97110 THERAPEUTIC EXERCISES: CPT

## 2019-10-17 PROCEDURE — 97140 MANUAL THERAPY 1/> REGIONS: CPT

## 2019-10-29 ENCOUNTER — HOSPITAL ENCOUNTER (OUTPATIENT)
Dept: PHYSICAL THERAPY | Age: 64
Setting detail: THERAPIES SERIES
Discharge: HOME | End: 2019-10-29
Payer: MEDICARE

## 2019-10-29 PROCEDURE — 97035 APP MDLTY 1+ULTRASOUND EA 15: CPT

## 2019-10-29 PROCEDURE — 97110 THERAPEUTIC EXERCISES: CPT

## 2019-10-31 ENCOUNTER — HOSPITAL ENCOUNTER (OUTPATIENT)
Dept: PHYSICAL THERAPY | Age: 64
Setting detail: THERAPIES SERIES
Discharge: HOME | End: 2019-10-31
Payer: MEDICARE

## 2019-12-07 RX ORDER — ATORVASTATIN CALCIUM 20 MG/1
TABLET, FILM COATED ORAL
Qty: 90 TABLET | Refills: 0 | Status: SHIPPED | OUTPATIENT
Start: 2019-12-07 | End: 2020-03-06 | Stop reason: SDUPTHER

## 2019-12-26 ENCOUNTER — OFFICE VISIT (OUTPATIENT)
Dept: FAMILY MEDICINE CLINIC | Age: 64
End: 2019-12-26
Payer: MEDICARE

## 2019-12-26 VITALS
HEIGHT: 65 IN | WEIGHT: 222.8 LBS | OXYGEN SATURATION: 98 % | SYSTOLIC BLOOD PRESSURE: 122 MMHG | TEMPERATURE: 99.2 F | DIASTOLIC BLOOD PRESSURE: 86 MMHG | BODY MASS INDEX: 37.12 KG/M2 | HEART RATE: 82 BPM

## 2019-12-26 DIAGNOSIS — J20.9 ACUTE BRONCHITIS, UNSPECIFIED ORGANISM: ICD-10-CM

## 2019-12-26 DIAGNOSIS — Z12.31 ENCOUNTER FOR SCREENING MAMMOGRAM FOR BREAST CANCER: ICD-10-CM

## 2019-12-26 DIAGNOSIS — Z78.0 POST-MENOPAUSAL: ICD-10-CM

## 2019-12-26 DIAGNOSIS — I10 ESSENTIAL HYPERTENSION: Primary | ICD-10-CM

## 2019-12-26 DIAGNOSIS — M25.551 RIGHT HIP PAIN: ICD-10-CM

## 2019-12-26 PROCEDURE — 99214 OFFICE O/P EST MOD 30 MIN: CPT | Performed by: NURSE PRACTITIONER

## 2019-12-26 RX ORDER — AZITHROMYCIN 500 MG/1
500 TABLET, FILM COATED ORAL DAILY
Qty: 3 TABLET | Refills: 0 | Status: SHIPPED | OUTPATIENT
Start: 2019-12-26 | End: 2019-12-29

## 2019-12-26 RX ORDER — MELOXICAM 15 MG/1
15 TABLET ORAL DAILY PRN
Qty: 30 TABLET | Refills: 0 | Status: SHIPPED | OUTPATIENT
Start: 2019-12-26 | End: 2019-12-27

## 2019-12-26 ASSESSMENT — ENCOUNTER SYMPTOMS
SINUS PRESSURE: 0
SINUS PAIN: 0
COUGH: 1
WHEEZING: 1
CONSTIPATION: 0
DIARRHEA: 0
SHORTNESS OF BREATH: 1
RHINORRHEA: 1
CHEST TIGHTNESS: 1
SORE THROAT: 0
NAUSEA: 0

## 2019-12-27 RX ORDER — MELOXICAM 15 MG/1
15 TABLET ORAL DAILY PRN
Qty: 90 TABLET | Refills: 1 | Status: SHIPPED | OUTPATIENT
Start: 2019-12-27 | End: 2020-07-21

## 2020-03-02 RX ORDER — HYDROCHLOROTHIAZIDE 25 MG/1
25 TABLET ORAL DAILY
Qty: 90 TABLET | Refills: 1 | Status: SHIPPED | OUTPATIENT
Start: 2020-03-02 | End: 2020-08-18

## 2020-03-02 RX ORDER — VENLAFAXINE HYDROCHLORIDE 37.5 MG/1
37.5 CAPSULE, EXTENDED RELEASE ORAL DAILY
Qty: 90 CAPSULE | Refills: 1 | Status: SHIPPED | OUTPATIENT
Start: 2020-03-02 | End: 2020-08-18 | Stop reason: SDUPTHER

## 2020-03-02 RX ORDER — LOSARTAN POTASSIUM 100 MG/1
TABLET ORAL
Qty: 90 TABLET | Refills: 1 | Status: SHIPPED | OUTPATIENT
Start: 2020-03-02 | End: 2020-08-18 | Stop reason: SDUPTHER

## 2020-03-06 RX ORDER — ATORVASTATIN CALCIUM 20 MG/1
TABLET, FILM COATED ORAL
Qty: 90 TABLET | Refills: 0 | Status: SHIPPED | OUTPATIENT
Start: 2020-03-06 | End: 2020-07-24

## 2020-06-03 ENCOUNTER — HOSPITAL ENCOUNTER (OUTPATIENT)
Dept: GENERAL RADIOLOGY | Age: 65
Discharge: HOME | End: 2020-06-03
Payer: MEDICARE

## 2020-06-03 ENCOUNTER — HOSPITAL ENCOUNTER (OUTPATIENT)
Dept: WOMENS IMAGING | Age: 65
Discharge: HOME | End: 2020-06-03
Payer: MEDICARE

## 2020-06-03 PROCEDURE — 77063 BREAST TOMOSYNTHESIS BI: CPT

## 2020-06-03 PROCEDURE — 77080 DXA BONE DENSITY AXIAL: CPT

## 2020-07-21 RX ORDER — MELOXICAM 15 MG/1
15 TABLET ORAL DAILY PRN
Qty: 90 TABLET | Refills: 0 | Status: SHIPPED | OUTPATIENT
Start: 2020-07-21 | End: 2020-08-18 | Stop reason: SDUPTHER

## 2020-07-24 RX ORDER — ATORVASTATIN CALCIUM 20 MG/1
TABLET, FILM COATED ORAL
Qty: 90 TABLET | Refills: 0 | Status: SHIPPED | OUTPATIENT
Start: 2020-07-24 | End: 2020-08-18 | Stop reason: SDUPTHER

## 2020-08-18 ENCOUNTER — OFFICE VISIT (OUTPATIENT)
Dept: FAMILY MEDICINE CLINIC | Age: 65
End: 2020-08-18
Payer: MEDICARE

## 2020-08-18 VITALS
SYSTOLIC BLOOD PRESSURE: 110 MMHG | DIASTOLIC BLOOD PRESSURE: 80 MMHG | WEIGHT: 238 LBS | HEART RATE: 77 BPM | BODY MASS INDEX: 39.61 KG/M2 | TEMPERATURE: 97.8 F | OXYGEN SATURATION: 98 %

## 2020-08-18 PROBLEM — E66.812 CLASS 2 OBESITY DUE TO EXCESS CALORIES WITHOUT SERIOUS COMORBIDITY WITH BODY MASS INDEX (BMI) OF 39.0 TO 39.9 IN ADULT: Status: ACTIVE | Noted: 2020-08-18

## 2020-08-18 PROBLEM — F41.9 ANXIETY: Status: ACTIVE | Noted: 2020-08-18

## 2020-08-18 PROBLEM — N95.1 MENOPAUSAL SYMPTOMS: Status: ACTIVE | Noted: 2020-08-18

## 2020-08-18 PROBLEM — E66.09 CLASS 2 OBESITY DUE TO EXCESS CALORIES WITHOUT SERIOUS COMORBIDITY WITH BODY MASS INDEX (BMI) OF 39.0 TO 39.9 IN ADULT: Status: ACTIVE | Noted: 2020-08-18

## 2020-08-18 PROCEDURE — 4040F PNEUMOC VAC/ADMIN/RCVD: CPT | Performed by: FAMILY MEDICINE

## 2020-08-18 PROCEDURE — G8399 PT W/DXA RESULTS DOCUMENT: HCPCS | Performed by: FAMILY MEDICINE

## 2020-08-18 PROCEDURE — G8427 DOCREV CUR MEDS BY ELIG CLIN: HCPCS | Performed by: FAMILY MEDICINE

## 2020-08-18 PROCEDURE — 99214 OFFICE O/P EST MOD 30 MIN: CPT | Performed by: FAMILY MEDICINE

## 2020-08-18 PROCEDURE — 1123F ACP DISCUSS/DSCN MKR DOCD: CPT | Performed by: FAMILY MEDICINE

## 2020-08-18 PROCEDURE — 4004F PT TOBACCO SCREEN RCVD TLK: CPT | Performed by: FAMILY MEDICINE

## 2020-08-18 PROCEDURE — 1090F PRES/ABSN URINE INCON ASSESS: CPT | Performed by: FAMILY MEDICINE

## 2020-08-18 PROCEDURE — G8417 CALC BMI ABV UP PARAM F/U: HCPCS | Performed by: FAMILY MEDICINE

## 2020-08-18 PROCEDURE — 3017F COLORECTAL CA SCREEN DOC REV: CPT | Performed by: FAMILY MEDICINE

## 2020-08-18 RX ORDER — MELOXICAM 15 MG/1
15 TABLET ORAL DAILY PRN
Qty: 90 TABLET | Refills: 3 | Status: SHIPPED | OUTPATIENT
Start: 2020-08-18 | End: 2021-01-21 | Stop reason: SDUPTHER

## 2020-08-18 RX ORDER — HYDROXYZINE HYDROCHLORIDE 25 MG/1
25 TABLET, FILM COATED ORAL EVERY 6 HOURS PRN
Qty: 30 TABLET | Refills: 0 | Status: SHIPPED | OUTPATIENT
Start: 2020-08-18 | End: 2020-09-17

## 2020-08-18 RX ORDER — VENLAFAXINE HYDROCHLORIDE 37.5 MG/1
37.5 CAPSULE, EXTENDED RELEASE ORAL DAILY
Qty: 90 CAPSULE | Refills: 3 | Status: SHIPPED | OUTPATIENT
Start: 2020-08-18 | End: 2021-08-17 | Stop reason: SDUPTHER

## 2020-08-18 RX ORDER — LOSARTAN POTASSIUM 100 MG/1
TABLET ORAL
Qty: 90 TABLET | Refills: 3 | Status: SHIPPED | OUTPATIENT
Start: 2020-08-18 | End: 2021-08-17 | Stop reason: SDUPTHER

## 2020-08-18 RX ORDER — ATORVASTATIN CALCIUM 20 MG/1
20 TABLET, FILM COATED ORAL DAILY
Qty: 90 TABLET | Refills: 3 | Status: SHIPPED | OUTPATIENT
Start: 2020-08-18 | End: 2020-10-22

## 2020-08-18 ASSESSMENT — PATIENT HEALTH QUESTIONNAIRE - PHQ9
SUM OF ALL RESPONSES TO PHQ QUESTIONS 1-9: 0
SUM OF ALL RESPONSES TO PHQ9 QUESTIONS 1 & 2: 0
1. LITTLE INTEREST OR PLEASURE IN DOING THINGS: 0
2. FEELING DOWN, DEPRESSED OR HOPELESS: 0
SUM OF ALL RESPONSES TO PHQ QUESTIONS 1-9: 0

## 2020-08-18 NOTE — PROGRESS NOTES
Tan Mclaughlin is a 75 y.o. female who presents for follow-up of HTN. Checks BP at home: No  BP numbers: NA  Taking medication daily: Yes, losartan  Side Effects of medication: no    Patient taking atorvastatin regularly as prescribed with no SE. Takes effexor for hot flashes. Also notes helps with her anxiety. Wonders about increasing dose to help with anxiety. States especially notes when she is in car and she is not driving. Takes meloxicam as needed for bursitis in right hip. Works well. Had spinal stenosis surgery in 2014. States she has done PT, exercise, etc. Regrets having done as can't walk distance, feet are numb, etc. States weight has increased and thinks because unable to do things. Before surgery had debilitating pain and that is better but now just feels weak and numb. Patient's medications, allergies, past medical, surgical, social and family histories were reviewed and updated in the EHR as appropriate. No CP, no palpitations, no sob, no edema, no cough, no change in bowel or bladder,  no nausea, no vomiting, no abdominal pain      Body mass index is 39.61 kg/m². Vitals:    08/18/20 0916 08/18/20 1017   BP: (!) 132/92 110/80   Site: Left Upper Arm    Position: Sitting    Cuff Size: Large Adult    Pulse: 77    Temp: 97.8 °F (36.6 °C)    TempSrc: Temporal    SpO2: 98%    Weight: 238 lb (108 kg)      Wt Readings from Last 3 Encounters:   08/18/20 238 lb (108 kg)   12/26/19 222 lb 12.8 oz (101.1 kg)   07/17/19 200 lb (90.7 kg)     PHQ score: PHQ-9 Total Score: 0 (8/18/2020  9:15 AM)      GENERAL:Alert and oriented x 4 NAD, affect appropriate and obese, well hydrated, well developed.   NECK:supple and non tender without mass, no thyromegaly or thyroid nodules, no cervical lymphadenopathy  LUNG:clear to auscultation bilaterally with normal respiratory effort  CV: Normal heart sounds, regular rate and rhythm without murmurs  EXTREMETY: no loss of hair, no edema, normal pedal pulses bilaterally        ASSESSMENT AND PLAN:       Kristie Terrell was seen today for hypertension. Diagnoses and all orders for this visit:    Essential hypertension  -     Comprehensive Metabolic Panel; Future  -     Lipid Panel; Future  -Stable, continue current medications. Other hyperlipidemia  -Stable, continue current medications. Class 2 obesity due to excess calories without serious comorbidity with body mass index (BMI) of 39.0 to 39.9 in adult  -Encourage low carb diet, watching calories, regular exercise and weight loss    Spinal stenosis of lumbar region, unspecified whether neurogenic claudication present  -     Loa Habermann, MD, Spine Surgery, [address] [place] [address], continue current medications. Add hydroxyzine for travel prn    B12 deficiency  -     VITAMIN B12 & FOLATE; Future    Folate deficiency  -     VITAMIN B12 & FOLATE; Future    Hyperglycemia  -     Hemoglobin A1C; Future  -Encourage low carb diet, watching calories, regular exercise and weight loss    Menopausal symptoms  -Stable, continue current medications. Tobacco abuse  Encouraged to quit, pt is not ready yet, discussed medications, patches, etc    Screen for colon cancer  -     AFL - dAis Petersen MD, Gastroenterology, [facility]    Other orders  -     venlafaxine (EFFEXOR XR) 37.5 MG extended release capsule; Take 1 capsule by mouth daily  -     losartan (COZAAR) 100 MG tablet; TAKE 1 TABLET BY MOUTH EVERY DAY  -     hydrOXYzine (ATARAX) 25 MG tablet; Take 1 tablet by mouth every 6 hours as needed for Anxiety  -     atorvastatin (LIPITOR) 20 MG tablet; Take 1 tablet by mouth daily  -     meloxicam (MOBIC) 15 MG tablet; Take 1 tablet by mouth daily as needed for Pain            Return in about 7 months (around 4/1/2021) for 30 min, AWV.              Note per DICKSON Dickens and Scribe with corrections and edits per Ariane Carcamo MD.  I agree with entirety of note and was present and performed history and physical.  I also confirm that the note above accurately reflects all work, treatment, procedures, and medical decision making performed by me, Xin Melo MD

## 2020-08-18 NOTE — LETTER
[address] [address]  Phone: [phone]  Fax: [phone]    Silke Sepulveda MD        August 18, 2020     Patient: Ilya Edwards   YOB: 1955   Date of Visit: 8/18/2020       To Whom It May Concern: It is my medical opinion that Jesusita Snowden requires a disability parking placard for medical reasons. Duration: Permanent      If you have any questions or concerns, please don't hesitate to call.     Sincerely,          Silke Sepulveda MD

## 2020-08-18 NOTE — PATIENT INSTRUCTIONS
can you care for yourself at home? · Ask your family, friends, and coworkers for support. You have a better chance of quitting if you have help and support. · Join a support group, such as Nicotine Anonymous, for people who are trying to quit smoking. · Consider signing up for a smoking cessation program, such as the American Lung Association's Freedom from Smoking program.  · Get text messaging support. Go to the website at www.smokefree. gov to sign up for the [facility] program.  · Set a quit date. Pick your date carefully so that it is not right in the middle of a big deadline or stressful time. Once you quit, do not even take a puff. Get rid of all ashtrays and lighters after your last cigarette. Clean your house and your clothes so that they do not smell of smoke. · Learn how to be a nonsmoker. Think about ways you can avoid those things that make you reach for a cigarette. ? Avoid situations that put you at greatest risk for smoking. For some people, it is hard to have a drink with friends without smoking. For others, they might skip a coffee break with coworkers who smoke. ? Change your daily routine. Take a different route to work or eat a meal in a different place. · Cut down on stress. Calm yourself or release tension by doing an activity you enjoy, such as reading a book, taking a hot bath, or gardening. · Talk to your doctor or pharmacist about nicotine replacement therapy, which replaces the nicotine in your body. You still get nicotine but you do not use tobacco. Nicotine replacement products help you slowly reduce the amount of nicotine you need. These products come in several forms, many of them available over-the-counter:  ? Nicotine patches  ? Nicotine gum and lozenges  ? Nicotine inhaler  · Ask your doctor about bupropion (Wellbutrin) or varenicline (Chantix), which are prescription medicines. They do not contain nicotine.  They help you by reducing withdrawal symptoms, such as stress and anxiety. · Some people find hypnosis, acupuncture, and massage helpful for ending the smoking habit. · Eat a healthy diet and get regular exercise. Having healthy habits will help your body move past its craving for nicotine. · Be prepared to keep trying. Most people are not successful the first few times they try to quit. Do not get mad at yourself if you smoke again. Make a list of things you learned and think about when you want to try again, such as next week, next month, or next year. Where can you learn more? Go to https://[employer].Rivalfox. org and sign in to your [employer] account. Enter Y658 in the Pivot3 box to learn more about \"Stopping Smoking: Care Instructions. \"     If you do not have an account, please click on the \"Sign Up Now\" link. Current as of: March 12, 2020               Content Version: 12.5  © 8822-1128 Healthwise, Incorporated. Care instructions adapted under license by [facility] ([facility]). If you have questions about a medical condition or this instruction, always ask your healthcare professional. [address] any warranty or liability for your use of this information.

## 2020-08-21 ENCOUNTER — HOSPITAL ENCOUNTER (OUTPATIENT)
Age: 65
Discharge: HOME | End: 2020-08-21
Payer: MEDICARE

## 2020-08-21 LAB
A/G RATIO: 1.4 (ref 1.1–2.2)
ALBUMIN SERPL-MCNC: 3.9 G/DL (ref 3.4–5)
ALP BLD-CCNC: 80 U/L (ref 40–129)
ALT SERPL-CCNC: 26 U/L (ref 10–40)
ANION GAP SERPL CALCULATED.3IONS-SCNC: 9 MMOL/L (ref 3–16)
AST SERPL-CCNC: 25 U/L (ref 15–37)
BILIRUB SERPL-MCNC: 0.6 MG/DL (ref 0–1)
BUN BLDV-MCNC: 17 MG/DL (ref 7–20)
CALCIUM SERPL-MCNC: 9.9 MG/DL (ref 8.3–10.6)
CHLORIDE BLD-SCNC: 106 MMOL/L (ref 99–110)
CHOLESTEROL, TOTAL: 160 MG/DL (ref 0–199)
CO2: 28 MMOL/L (ref 21–32)
CREAT SERPL-MCNC: 0.5 MG/DL (ref 0.6–1.2)
ESTIMATED AVERAGE GLUCOSE: 125.5 MG/DL
FOLATE: >20 NG/ML (ref 4.78–24.2)
GFR AFRICAN AMERICAN: >60
GFR NON-AFRICAN AMERICAN: >60
GLOBULIN: 2.8 G/DL
GLUCOSE BLD-MCNC: 95 MG/DL (ref 70–99)
HBA1C MFR BLD: 6 %
HDLC SERPL-MCNC: 75 MG/DL (ref 40–60)
LDL CHOLESTEROL CALCULATED: 69 MG/DL
POTASSIUM SERPL-SCNC: 4.6 MMOL/L (ref 3.5–5.1)
SODIUM BLD-SCNC: 143 MMOL/L (ref 136–145)
TOTAL PROTEIN: 6.7 G/DL (ref 6.4–8.2)
TRIGL SERPL-MCNC: 81 MG/DL (ref 0–150)
VITAMIN B-12: 782 PG/ML (ref 211–911)
VLDLC SERPL CALC-MCNC: 16 MG/DL

## 2020-08-21 PROCEDURE — 82746 ASSAY OF FOLIC ACID SERUM: CPT

## 2020-08-21 PROCEDURE — 36415 COLL VENOUS BLD VENIPUNCTURE: CPT

## 2020-08-21 PROCEDURE — 82607 VITAMIN B-12: CPT

## 2020-08-21 PROCEDURE — 83036 HEMOGLOBIN GLYCOSYLATED A1C: CPT

## 2020-08-21 PROCEDURE — 80061 LIPID PANEL: CPT

## 2020-08-21 PROCEDURE — 80053 COMPREHEN METABOLIC PANEL: CPT

## 2020-08-28 NOTE — TELEPHONE ENCOUNTER
Received fax from pharmacy requesting Losartan refill.  Medication has been sent to the pharmacy on 8/18/2020

## 2020-10-22 RX ORDER — ATORVASTATIN CALCIUM 20 MG/1
TABLET, FILM COATED ORAL
Qty: 90 TABLET | Refills: 3 | Status: SHIPPED | OUTPATIENT
Start: 2020-10-22 | End: 2021-08-17 | Stop reason: SDUPTHER

## 2021-01-21 RX ORDER — MELOXICAM 15 MG/1
15 TABLET ORAL DAILY PRN
Qty: 90 TABLET | Refills: 3 | Status: SHIPPED | OUTPATIENT
Start: 2021-01-21 | End: 2021-08-17 | Stop reason: SDUPTHER

## 2021-01-21 NOTE — TELEPHONE ENCOUNTER
Medication:   Requested Prescriptions      No prescriptions requested or ordered in this encounter      Patient Phone Number: [phone] (home)     Last appt: 8/18/2020   Next appt: 4/26/2021    Last OARRS: No flowsheet data found.   PDMP Monitoring:    Last PDMP Jaylin Daryl as Reviewed [facility]):  Review User Review Instant Review Result          Preferred Pharmacy:   [place] [address] [address], [address] [facility] [phone] [facility] [facility] [phone]  [address] [place] [address]  Phone: [phone] Fax: [phone]    [place] [address] [address], [address] [facility] [phone] -  [phone]  [facility] [address] [address]  Phone: [phone] Fax: Pamela [address], [address] [address]  Phone: [phone] Fax: [phone]

## 2021-05-04 ENCOUNTER — OFFICE VISIT (OUTPATIENT)
Dept: FAMILY MEDICINE CLINIC | Age: 66
End: 2021-05-04
Payer: MEDICARE

## 2021-05-04 VITALS
HEIGHT: 65 IN | OXYGEN SATURATION: 98 % | BODY MASS INDEX: 39.39 KG/M2 | WEIGHT: 236.4 LBS | DIASTOLIC BLOOD PRESSURE: 88 MMHG | HEART RATE: 82 BPM | SYSTOLIC BLOOD PRESSURE: 130 MMHG | TEMPERATURE: 97.3 F

## 2021-05-04 DIAGNOSIS — Z12.31 ENCOUNTER FOR SCREENING MAMMOGRAM FOR MALIGNANT NEOPLASM OF BREAST: ICD-10-CM

## 2021-05-04 DIAGNOSIS — E53.8 FOLATE DEFICIENCY: ICD-10-CM

## 2021-05-04 DIAGNOSIS — I10 ESSENTIAL HYPERTENSION: ICD-10-CM

## 2021-05-04 DIAGNOSIS — Z00.00 WELL ADULT EXAM: Primary | ICD-10-CM

## 2021-05-04 DIAGNOSIS — Z23 NEED FOR VACCINATION: ICD-10-CM

## 2021-05-04 DIAGNOSIS — E78.49 OTHER HYPERLIPIDEMIA: ICD-10-CM

## 2021-05-04 DIAGNOSIS — Z87.891 PERSONAL HISTORY OF TOBACCO USE: ICD-10-CM

## 2021-05-04 DIAGNOSIS — R73.02 IGT (IMPAIRED GLUCOSE TOLERANCE): ICD-10-CM

## 2021-05-04 DIAGNOSIS — E53.8 B12 DEFICIENCY: ICD-10-CM

## 2021-05-04 DIAGNOSIS — F41.9 ANXIETY: ICD-10-CM

## 2021-05-04 PROCEDURE — G8427 DOCREV CUR MEDS BY ELIG CLIN: HCPCS | Performed by: FAMILY MEDICINE

## 2021-05-04 PROCEDURE — 1123F ACP DISCUSS/DSCN MKR DOCD: CPT | Performed by: FAMILY MEDICINE

## 2021-05-04 PROCEDURE — G0296 VISIT TO DETERM LDCT ELIG: HCPCS | Performed by: FAMILY MEDICINE

## 2021-05-04 PROCEDURE — G8399 PT W/DXA RESULTS DOCUMENT: HCPCS | Performed by: FAMILY MEDICINE

## 2021-05-04 PROCEDURE — G8417 CALC BMI ABV UP PARAM F/U: HCPCS | Performed by: FAMILY MEDICINE

## 2021-05-04 PROCEDURE — 1090F PRES/ABSN URINE INCON ASSESS: CPT | Performed by: FAMILY MEDICINE

## 2021-05-04 PROCEDURE — 99214 OFFICE O/P EST MOD 30 MIN: CPT | Performed by: FAMILY MEDICINE

## 2021-05-04 PROCEDURE — 90732 PPSV23 VACC 2 YRS+ SUBQ/IM: CPT | Performed by: FAMILY MEDICINE

## 2021-05-04 PROCEDURE — 4040F PNEUMOC VAC/ADMIN/RCVD: CPT | Performed by: FAMILY MEDICINE

## 2021-05-04 PROCEDURE — 3017F COLORECTAL CA SCREEN DOC REV: CPT | Performed by: FAMILY MEDICINE

## 2021-05-04 PROCEDURE — G0009 ADMIN PNEUMOCOCCAL VACCINE: HCPCS | Performed by: FAMILY MEDICINE

## 2021-05-04 PROCEDURE — G0438 PPPS, INITIAL VISIT: HCPCS | Performed by: FAMILY MEDICINE

## 2021-05-04 PROCEDURE — 4004F PT TOBACCO SCREEN RCVD TLK: CPT | Performed by: FAMILY MEDICINE

## 2021-05-04 RX ORDER — CLONAZEPAM 0.5 MG/1
0.25 TABLET ORAL 2 TIMES DAILY PRN
Qty: 6 TABLET | Refills: 0 | Status: SHIPPED | OUTPATIENT
Start: 2021-05-04 | End: 2022-05-09

## 2021-05-04 ASSESSMENT — PATIENT HEALTH QUESTIONNAIRE - PHQ9
SUM OF ALL RESPONSES TO PHQ QUESTIONS 1-9: 0
1. LITTLE INTEREST OR PLEASURE IN DOING THINGS: 0
SUM OF ALL RESPONSES TO PHQ QUESTIONS 1-9: 0

## 2021-05-04 NOTE — PATIENT INSTRUCTIONS
Send me copy of your covid vaccine    Check with pharmacy about getting the shingles vaccine, Shingrix (not Zostavax)     Bring in copy of living will and healthcare power of  for your chart. Call and schedule your colonoscopy  [address]   [place], [address]   Phone: [phone]   Fax: [phone]       Well Visit, Over 72: Care Instructions  Overview     Well visits can help you stay healthy. Your doctor has checked your overall health and may have suggested ways to take good care of yourself. Your doctor also may have recommended tests. At home, you can help prevent illness with healthy eating, regular exercise, and other steps. Follow-up care is a key part of your treatment and safety. Be sure to make and go to all appointments, and call your doctor if you are having problems. It's also a good idea to know your test results and keep a list of the medicines you take. How can you care for yourself at home? · Get screening tests that you and your doctor decide on. Screening helps find diseases before any symptoms appear. · Eat healthy foods. Choose fruits, vegetables, whole grains, protein, and low-fat dairy foods. Limit fat, especially saturated fat. Reduce salt in your diet. · Limit alcohol. If you are a man, have no more than 2 drinks a day or 14 drinks a week. If you are a woman, have no more than 1 drink a day or 7 drinks a week. Since alcohol affects older adults differently, you may want to limit alcohol even more. Or you may not want to drink at all. · Get at least 30 minutes of exercise on most days of the week. Walking is a good choice. You also may want to do other activities, such as running, swimming, cycling, or playing tennis or team sports. · Reach and stay at a healthy weight. This will lower your risk for many problems, such as obesity, diabetes, heart disease, and high blood pressure. · Do not smoke.  Smoking can make health problems worse. If you need help quitting, talk to your doctor about stop-smoking programs and medicines. These can increase your chances of quitting for good. · Care for your mental health. It is easy to get weighed down by worry and stress. Learn strategies to manage stress, like deep breathing and mindfulness, and stay connected with your family and community. If you find you often feel sad or hopeless, talk with your doctor. Treatment can help. · Talk to your doctor about whether you have any risk factors for sexually transmitted infections (STIs). You can help prevent STIs if you wait to have sex with a new partner (or partners) until you've each been tested for STIs. It also helps if you use condoms (male or female condoms) and if you limit your sex partners to one person who only has sex with you. Vaccines are available for some STIs. · If you think you may have a problem with alcohol or drug use, talk to your doctor. This includes prescription medicines (such as amphetamines and opioids) and illegal drugs (such as cocaine and methamphetamine). Your doctor can help you figure out what type of treatment is best for you. · Protect your skin from too much sun. When you're outdoors from 10 a.m. to 4 p.m., stay in the shade or cover up with clothing and a hat with a wide brim. Wear sunglasses that block UV rays. Even when it's cloudy, put broad-spectrum sunscreen (SPF 30 or higher) on any exposed skin. · See a dentist one or two times a year for checkups and to have your teeth cleaned. · Wear a seat belt in the car. When should you call for help? Watch closely for changes in your health, and be sure to contact your doctor if you have any problems or symptoms that concern you. Where can you learn more? Go to [email or web address]://george.health-[email or web address]. org and sign in to your Textic account. Enter P306 in the [employer] [place] box to learn more about \"Well Visit, Over 65: Care Instructions. \"     If you do not have an account, please click on the \"Sign Up Now\" link. Current as of: May 27, 2020               Content Version: 12.8  © 2006-2021 Healthwise, Song[employer]. Care instructions adapted under license by [facility] ([facility]). If you have questions about a medical condition or this instruction, always ask your healthcare professional. [address] any warranty or liability for your use of this information. FALL PREVENTION TIPS    Who is at high risk of falling? Anyone can fall, although the risk is higher in older people. This increased risk of falling may be the result of changes that come with aging, and certain medical conditions, such as arthritis, cataracts or hip problems. What can I do to lower my risk of falling? Most falls happen in the home. Consider the following tips to make your home safe:  -Make sure that you have good lighting in your home. A well lit home will help you avoid tripping over objects that are not easy to see. Put night lights in your bedroom, hallways, stairs and bathrooms.  -Rugs should be firmly fastened to the floor or have nonskid backing. Loose ends should be tacked down.  -Electrical cords should not be lying on the floor in walking areas.  -Put hand rails in your bathroom for bath, shower and toilet use. -Have rails on both sides of your stairs for support.  -In the kitchen, make sure items are within easy reach. Dont store things too high or too low. Then you wont have to use a stepladder or a stool to reach them. Its also a good idea to avoid storing things too low, so you wont have to bend down to get them.  -Wear shoes with firm nonskid soles. Avoid wearing loose-fitting slippers that could cause you to trip. What else can I do? Take good care of your body. Try to stay healthy by following these tips:  -See your eye doctor once a year. Cataracts and other eye diseases that cause you not to see well, can lead to falls.   -Get regular physical activity to keep your bones and muscles strong.  -Take good care of your feet. If you have pain in your feet or if you have large, thick nails and corns, have your doctor look at your feet. -Talk to your doctor about any side effects you may have from your medicines. Problems caused by side effects from medicine are a common cause of falls. The more medicines you take, the greater your risk of falling.  -Talk to your doctor if you have dizzy spells.  -If your doctor suggests that you use a cane or a walker to help you walk, be sure to use it. This will give you extra stability when walking and will help you avoid falls.  -Dont smoke.  -Limit alcohol to no more than 2 drinks per day. -When you get out of bed in the morning or at night to use the bathroom, sit on the side of the bed for a few minutes before standing up. Your blood pressure takes some time to adjust when you sit up. It may be too low if you get up quickly. This can make you dizzy, and you might lose your balance and fall. Last Updated: November 2010\cb3 This article was contributed by: [email or web address] org editorial staff  Patient Education        Prediabetes: Care Instructions  Overview     Prediabetes is a warning sign that you're at risk for getting type 2 diabetes. It means that your blood sugar is higher than it should be. But it's not high enough to be diabetes. The food you eat naturally turns into sugar. Your body uses the sugar for energy. Normally, an organ called the pancreas makes insulin. And insulin allows the sugar in your blood to get into your body's cells. But sometimes the body can't use insulin the right way. So the sugar stays in your blood instead. This is called insulin resistance. The buildup of sugar in your blood means you have prediabetes. The good news is that you may be able to prevent or delay diabetes.  Making small lifestyle changes, like getting active and changing your eating habits, may help you get your blood sugar back to normal. You can work with your doctor to make a treatment plan. Follow-up care is a key part of your treatment and safety. Be sure to make and go to all appointments, and call your doctor if you are having problems. It's also a good idea to know your test results and keep a list of the medicines you take. How can you care for yourself at home? · Watch your weight. A healthy weight helps your body use insulin properly. · Limit the amount of calories, sweets, and unhealthy fat you eat. Ask your doctor if you should see a dietitian. A registered dietitian can help you create meal plans that fit your lifestyle. · Get at least 30 minutes of exercise on most days of the week. Exercise helps control your blood sugar. It also helps you maintain a healthy weight. Walking is a good choice. You also may want to do other activities, such as running, swimming, cycling, or playing tennis or team sports. · Do not smoke. Smoking can make prediabetes worse. If you need help quitting, talk to your doctor about stop-smoking programs and medicines. These can increase your chances of quitting for good. · If your doctor prescribed medicines, take them exactly as prescribed. Call your doctor if you think you are having a problem with your medicine. You will get more details on the specific medicines your doctor prescribes. When should you call for help? Watch closely for changes in your health, and be sure to contact your doctor if:    · You have any symptoms of diabetes. These may include:  ? Being thirsty more often. ? Urinating more. ? Being hungrier. ? Losing weight. ? Being very tired. ? Having blurry vision.     · You have a wound that will not heal.     · You have an infection that will not go away.     · You have problems with your blood pressure.     · You want more information about diabetes and how you can keep from getting it. Where can you learn more?   Go to [email or web address] and sign in to your Sitesimon account. Enter I222 in the [facility] box to learn more about \"Prediabetes: Care Instructions. \"     If you do not have an account, please click on the \"Sign Up Now\" link. Current as of: August 31, 2020               Content Version: 12.8  © 6439-7454 NXT-[employer]. Care instructions adapted under license by [employer]. If you have questions about a medical condition or this instruction, always ask your healthcare professional. [address] any warranty or liability for your use of this information. Patient Education        Learning About Carbohydrate (Carb) Counting and Eating Out When You Have Diabetes  Why plan your meals? Meal planning can be a key part of managing diabetes. Planning meals and snacks with the right balance of carbohydrate, protein, and fat can help you keep your blood sugar at the target level you set with your doctor. You don't have to eat special foods. You can eat what your family eats, including sweets once in a while. But you do have to pay attention to how often you eat and how much you eat of certain foods. You may want to work with a dietitian or a certified diabetes educator. He or she can give you tips and meal ideas and can answer your questions about meal planning. This health professional can also help you reach a healthy weight if that is one of your goals. What should you know about eating carbs? Managing the amount of carbohydrate (carbs) you eat is an important part of healthy meals when you have diabetes. Carbohydrate is found in many foods. · Learn which foods have carbs. And learn the amounts of carbs in different foods. ? Bread, cereal, pasta, and rice have about 15 grams of carbs in a serving. A serving is 1 slice of bread (1 ounce), ½ cup of cooked cereal, or 1/3 cup of cooked pasta or rice. ? Fruits have 15 grams of carbs in a serving.  A condition or this instruction, always ask your healthcare professional. [address] any warranty or liability for your use of this information. Patient Education        Stopping Smoking: Care Instructions  Your Care Instructions     Cigarette smokers crave the nicotine in cigarettes. Giving it up is much harder than simply changing a habit. Your body has to stop craving the nicotine. It is hard to quit, but you can do it. There are many tools that people use to quit smoking. You may find that combining tools works best for you. There are several steps to quitting. First you get ready to quit. Then you get support to help you. After that, you learn new skills and behaviors to become a nonsmoker. For many people, a necessary step is getting and using medicine. Your doctor will help you set up the plan that best meets your needs. You may want to attend a smoking cessation program to help you quit smoking. When you choose a program, look for one that has proven success. Ask your doctor for ideas. You will greatly increase your chances of success if you take medicine as well as get counseling or join a cessation program.  Some of the changes you feel when you first quit tobacco are uncomfortable. Your body will miss the nicotine at first, and you may feel short-tempered and grumpy. You may have trouble sleeping or concentrating. Medicine can help you deal with these symptoms. You may struggle with changing your smoking habits and rituals. The last step is the tricky one: Be prepared for the smoking urge to continue for a time. This is a lot to deal with, but keep at it. You will feel better. Follow-up care is a key part of your treatment and safety. Be sure to make and go to all appointments, and call your doctor if you are having problems. It's also a good idea to know your test results and keep a list of the medicines you take. How can you care for yourself at home?   · Ask your family, friends, and coworkers for support. You have a better chance of quitting if you have help and support. · Join a support group, such as Nicotine Anonymous, for people who are trying to quit smoking. · Consider signing up for a smoking cessation program, such as the American Lung Association's Freedom from Smoking program.  · Get text messaging support. Go to the website at www.smokefree. gov to sign up for the [facility] program.  · Set a quit date. Pick your date carefully so that it is not right in the middle of a big deadline or stressful time. Once you quit, do not even take a puff. Get rid of all ashtrays and lighters after your last cigarette. Clean your house and your clothes so that they do not smell of smoke. · Learn how to be a nonsmoker. Think about ways you can avoid those things that make you reach for a cigarette. ? Avoid situations that put you at greatest risk for smoking. For some people, it is hard to have a drink with friends without smoking. For others, they might skip a coffee break with coworkers who smoke. ? Change your daily routine. Take a different route to work or eat a meal in a different place. · Cut down on stress. Calm yourself or release tension by doing an activity you enjoy, such as reading a book, taking a hot bath, or gardening. · Talk to your doctor or pharmacist about nicotine replacement therapy, which replaces the nicotine in your body. You still get nicotine but you do not use tobacco. Nicotine replacement products help you slowly reduce the amount of nicotine you need. These products come in several forms, many of them available over-the-counter:  ? Nicotine patches  ? Nicotine gum and lozenges  ? Nicotine inhaler  · Ask your doctor about bupropion (Wellbutrin) or varenicline (Chantix), which are prescription medicines. They do not contain nicotine. They help you by reducing withdrawal symptoms, such as stress and anxiety.   · Some people find hypnosis, acupuncture, and massage helpful for ending the smoking habit. · Eat a healthy diet and get regular exercise. Having healthy habits will help your body move past its craving for nicotine. · Be prepared to keep trying. Most people are not successful the first few times they try to quit. Do not get mad at yourself if you smoke again. Make a list of things you learned and think about when you want to try again, such as next week, next month, or next year. Where can you learn more? Go to https://SmartAngels.fr.Expertcloud.de. org and sign in to your Gojimo account. Enter C254 in the [facility] box to learn more about \"Stopping Smoking: Care Instructions. \"     If you do not have an account, please click on the \"Sign Up Now\" link. Current as of: March 12, 2020               Content Version: 12.8  © 1237-2600 Debteye. Care instructions adapted under license by [facility] If you have questions about a medical condition or this instruction, always ask your healthcare professional. [place] [address] any warranty or liability for your use of this information. What is lung cancer screening? Lung cancer screening is a way in which doctors check the lungs for early signs of cancer in people who have no symptoms of lung cancer. A low-dose CT scan uses much less radiation than a normal CT scan and shows a more detailed image of the lungs than a standard X-ray. The goal of lung cancer screening is to find cancer early, before it has a chance to grow, spread, or cause problems. One large study found that smokers who were screened with low-dose CT scans were less likely to die of lung cancer than those who were screened with standard X-ray. Below is a summary of the things you need to know regarding screening for lung cancer with low-dose computed tomography (LDCT). This is a screening program that involves routine annual screening with LDCT studies of the lung.   The LDCTs are done using low-dose radiation that is not thought to increase your cancer risk. If you have other serious medical conditions (other cancers, congestive heart failure) that limit your life expectancy to less than 10 years, you should not undergo lung cancer screening with LDCT. The chance is 20%-60% that the LDCT result will show abnormalities. This would require additional testing which could include repeat imaging or even invasive procedures. Most (about 95%) of \"abnormal\" LDCT results are false in the sense that no lung cancer is ultimately found. Additionally, some (about 10%) of the cancers found would not affect your life expectancy, even if undetected and untreated. If you are still smoking, the single most important thing that you can do to reduce your risk of dying of lung cancer is to quit. For this screening to be covered by Medicare and most other insurers, strict criteria must be met. If you do not meet these criteria, but still wish to undergo LDCT testing, you will be required to sign a waiver indicating your willingness to pay for the scan. Patient Education        Pneumococcal Polysaccharide Vaccine: What You Need to Know  Why get vaccinated? Pneumococcal polysaccharide vaccine (PPSV23) can prevent pneumococcal disease. Pneumococcal disease refers to any illness caused by pneumococcal bacteria. These bacteria can cause many types of illnesses, including pneumonia, which is an infection of the lungs. Pneumococcal bacteria are one of the most common causes of pneumonia. Besides pneumonia, pneumococcal bacteria can also cause:  · Ear infections,  · Sinus infections  · Meningitis (infection of the tissue covering the brain and spinal cord)  · Bacteremia (bloodstream infection)  Anyone can get pneumococcal disease, but children under 3years of age, people with certain medical conditions, adults 72 years or older, and cigarette smokers are at the highest risk.   Most pneumococcal infections are mild. However, some can result in long-term problems, such as brain damage or hearing loss. Meningitis, bacteremia, and pneumonia caused by pneumococcal disease can be fatal.  PPSV23  PPSV23 protects against 23 types of bacteria that cause pneumococcal disease. PPSV23 is recommended for:  · All adults 72 years or older,  · Anyone 2 years or older with certain medical conditions that can lead to an increased risk for pneumococcal disease. Most people need only one dose of PPSV23. A second dose of PPSV23, and another type of pneumococcal vaccine called PCV13, are recommended for certain high-risk groups. Your health care provider can give you more information. People 65 years or older should get a dose of PPSV23 even if they have already gotten one or more doses of the vaccine before they turned 65. Talk with your health care provider  Tell your vaccine provider if the person getting the vaccine:  · Has had an allergic reaction after a previous dose of PPSV23, or has any severe, life-threatening allergies. In some cases, your health care provider may decide to postpone PPSV23 vaccination to a future visit. People with minor illnesses, such as a cold, may be vaccinated. People who are moderately or severely ill should usually wait until they recover before getting PPSV23. Your health care provider can give you more information. Risks of a vaccine reaction  · Redness or pain where the shot is given, feeling tired, fever, or muscle aches can happen after PPSV23. People sometimes faint after medical procedures, including vaccination. Tell your provider if you feel dizzy or have vision changes or ringing in the ears. As with any medicine, there is a very remote chance of a vaccine causing a severe allergic reaction, other serious injury, or death. What if there is a serious problem? An allergic reaction could occur after the vaccinated person leaves the clinic.  If you see signs of a severe allergic reaction (hives, swelling of the face and throat, difficulty breathing, a fast heartbeat, dizziness, or weakness), call 9-1-1 and get the person to the nearest hospital.  For other signs that concern you, call your health care provider. Adverse reactions should be reported to the Vaccine Adverse Event Reporting System (VAERS). Your health care provider will usually file this report, or you can do it yourself. Visit the VAERS website at www.vaers. hhs.gov at www.vaers. hhs.gov or call 7-706.177.8514. VAERS is only for reporting reactions, and VAERS staff do not give medical advice. How can I learn more? · Ask your health care provider. · Call your local or state health department. · Contact the Centers for Disease Control and Prevention (CDC):  ? Call 8-710.645.1773 (1-800-CDC-INFO) or  ? Visit CDC's website at www.cdc.gov/vaccines  Vaccine Information Statement  PPSV23 Vaccine  10/30/2019  [facility] and [facility] for Disease Control and Prevention  Many Vaccine Information Statements are available in [place] and other languages. See www.immunize.org/vis. Hojas de información Sobre Vacunas están disponibles en español y en muchos otros idiomas. Visite Noe.si. Care instructions adapted under license by [facility] ([facility]). If you have questions about a medical condition or this instruction, always ask your healthcare professional. [place] [address] any warranty or liability for your use of this information.

## 2021-05-04 NOTE — PROGRESS NOTES
Immunization(s) given during visit:     Immunizations Administered     Name Date Dose Route    Pneumococcal Polysaccharide ([id number]) 5/4/2021 0.5 mL Intramuscular    Site: Deltoid- Left    Lot: [id number]    NDC: 8908-3807-97           Patient instructed to remain in clinic for 20 minutes after injection and was advised to report any adverse reaction to me immediately.

## 2021-05-04 NOTE — PROGRESS NOTES
[facility] VISIT      Patient is here for their Medicare Annual Wellness Visit and f/u HTN, chol, anxiety and sugar    Last eye exam: Went in August  Last dental exam: going thursday  Exercise: works in yard, walks in pool, housework  Diet: in general, a \"healthy\" diet  , high carbs    How would you rate your overall health? : Good        Fall Risk 5/4/2021 8/18/2020 7/17/2019   2 or more falls in past year? no no yes   Fall with injury in past year? no no no       PHQ Scores 5/4/2021 8/18/2020 7/17/2019 7/26/2018 11/8/2017   PHQ2 Score 0 0 0 1 2   PHQ9 Score 0 0 0 1 2       Do you always wear a seat belt in the car?: Yes      Have you noted any problems with hearing?: No  Have you noted any vision problems?: No  Do you have concerns about your sexual health?: no  In the past month how much has pain been an issue for you?:  A little bit - back and legs  In the past month have you had issues with anxiety, loneliness, irritability or fatigue:  Not at all    Do you take opioid medications even sometimes? No (if using assess risk and whether other treatments would be beneficial)    Living Will: meeting with  to get done,   Copy requested    Who lives at home with you:   Do you have any services coming to your home (meals on wheels, home health, etc) ? : no      Do you need help with:  Using the phone:  No  Bathing: No  Dressing:  No  Toileting: No  Transportation:  No  Shopping: No  Preparing meals: No  Housework/Laundry: No  Medications: No  Money management: No    Does your home have:  Unsecured throw rugs: No  Grab bars in bathroom: Yes  Walk in shower: Yes  Seat in shower: No  Lit pathways for night (nightlights): Yes    Memory:  Have you or anyone close to you expressed concerns about your memory: No    Knows:  Month: Yes  Day: Yes  Year: Yes  Day of Week: Yes  Able to Recall (orange, boat, pencil) : Yes    Patient history:   Patient's medications, allergies, past medical, surgical, social and family histories were reviewed and updated in the EHR under History. Care Team:  Patient's list of care team members was updated in EHR under the Snap Shot. Goes to [facility] [employer] [facility]    Immunizations: Reviewed with patient. Got Covid vaccine in [facility]    Health Maintenance Due   Topic Date Due    Hepatitis C screen  Never done    COVID-19 Vaccine (1) Never done    Colon cancer screen colonoscopy  Never done    Annual Wellness Visit (AWV)  Never done    Pneumococcal 65+ years Vaccine (2 of 2 - PPSV23) 01/01/2021       CHRONIC CONDITIONS     States gets anxious when travels long distances. Effexor controls overall anxiety. Would like something for travel. Tried hydroxyzine but no help. Checks BP at home: Yes  BP numbers: 120's/ 80's  Taking medication daily: Yes  Side Effects of medication: no    Takes meloxicam for back and hip pain. Takes daily and works well. Taking chol meds reg, no SE. Physical Exam:    Body mass index is 39.34 kg/m². Vitals:    05/04/21 0949   BP: 130/88   Site: Right Upper Arm   Position: Sitting   Cuff Size: Large Adult   Pulse: 82   Temp: 97.3 °F (36.3 °C)   TempSrc: Infrared   SpO2: 98%   Weight: 236 lb 6.4 oz (107.2 kg)   Height: 5' 5\" (1.651 m)     Wt Readings from Last 3 Encounters:   05/04/21 236 lb 6.4 oz (107.2 kg)   08/18/20 238 lb (108 kg)   12/26/19 222 lb 12.8 oz (101.1 kg)       GENERAL:Alert and oriented x 4 NAD, affect appropriate and obese, well hydrated, well developed. LUNG:clear to auscultation bilaterally with normal respiratory effort  CV: Normal heart sounds, regular rate and rhythm without murmurs  EXTREMETY: no loss of hair, no edema, normal pedal pulses bilaterally    Was the timed get up and go unsteady or longer than 20 seconds: No    Assessment/Plan:    Reno Alexandra was seen today for medicare awv.     Diagnoses and all orders for this visit:    Well adult exam  Recommended screenings discussed and ordered if patient agreed  Recommended vaccinations discussed and ordered if patient agreed  Encouraged healthy diet   Encouraged regular exercise and maintaining a healthy weight    Medicare Safety Interventions: Home safety tips provided  Individualized [address] included in patient instructions and AVS  Send us copy of covid vaccine record      Essential hypertension  -     Comprehensive Metabolic Panel; Future  -     CBC; Future  -     Lipid Panel; Future  -Stable, continue current medications. Other hyperlipidemia  -Stable, continue current medications. Anxiety  -     clonazePAM (KLONOPIN) 0.5 MG tablet; Take 0.5 tablets by mouth 2 times daily as needed for Anxiety for up to 7 days.  -Stable, continue effexor    Controlled Substances Monitoring:   [place]AR[place] report reviewed         B12 deficiency  -     CBC; Future  -     VITAMIN B12; Future    IGT (impaired glucose tolerance)  -     Hemoglobin A1C; Future  -Encourage low carb diet, watching calories, regular exercise and weight loss    Folate deficiency  -     FOLATE; Future    Need for vaccination  -     Pneumococcal polysaccharide vaccine 23-valent greater than or equal to 1yo subcutaneous/IM    Encounter for screening mammogram for malignant neoplasm of breast  -     DAVID DIGITAL SCREEN W OR WO CAD BILATERAL; Future    Personal history of tobacco use  -     OH VISIT TO DISCUSS LUNG CA SCREEN W LDCT  -     CT Lung Screen (Annual); Future            Return in about 1 year (around 5/4/2022) for AWV, 30 min.          Portions of Note per  Beverly Molina CMA AAMA with corrections and edits per Maty Wadsworth MD.  I agree with entirety of note and was present and performed history and physical.  I also confirm that the note above accurately reflects all work, treatment, procedures, and medical decision making performed by Maty ramírez MD          Low Dose CT (LDCT) Lung Screening criteria met   Age 50-69   Pack year smoking >30   Still smoking or less than 15 year since quit   No sign or symptoms of lung cancer   > 11 months since last LDCT     Risks and benefits of lung cancer screening with LDCT scans discussed:    Significance of positive screen - False-positive LDCT results often occur. 95% of all positive results do not lead to a diagnosis of cancer. Usually further imaging can resolve most false-positive results; however, some patients may require invasive procedures. Over diagnosis risk - 10% to 12% of screen-detected lung cancer cases are over diagnosedthat is, the cancer would not have been detected in the patient's lifetime without the screening. Need for follow up screens annually to continue lung cancer screening effectiveness     Risks associated with radiation from annual LDCT- Radiation exposure is about the same as for a mammogram, which is about 1/3 of the annual background radiation exposure from everyday life. Starting screening at age 54 is not likely to increase cancer risk from radiation exposure. Patients with comorbidities resulting in life expectancy of < 10 years, or that would preclude treatment of an abnormality identified on CT, should not be screened due to lack of benefit.     To obtain maximal benefit from this screening, smoking cessation and long-term abstinence from smoking is critical

## 2021-05-05 ENCOUNTER — TELEPHONE (OUTPATIENT)
Dept: FAMILY MEDICINE CLINIC | Age: 66
End: 2021-05-05

## 2021-05-05 DIAGNOSIS — Z12.11 SCREEN FOR COLON CANCER: Primary | ICD-10-CM

## 2021-05-05 NOTE — TELEPHONE ENCOUNTER
----- Message from Kem Hodgkins sent at 5/5/2021 12:42 PM EDT -----  Subject: Referral Request    QUESTIONS   Reason for referral request? Pt is wanting a colonoscopy. Has the physician seen you for this condition before? No   Preferred Specialist (if applicable)? Madai Driver  Do you already have an appointment scheduled? No  Additional Information for Provider?   ---------------------------------------------------------------------------  --------------  CALL BACK INFO  What is the best way for the office to contact you? OK to leave message on   voicemail  Preferred Call Back Phone Number?  [phone]

## 2021-05-18 ENCOUNTER — TELEPHONE (OUTPATIENT)
Dept: FAMILY MEDICINE CLINIC | Age: 66
End: 2021-05-18

## 2021-05-18 NOTE — TELEPHONE ENCOUNTER
sent in copy of patient's Covid 19 vaccination record card through his chart. Abstracted vaccines into patient's chart and scanned copy of card.

## 2021-06-03 ENCOUNTER — HOSPITAL ENCOUNTER (OUTPATIENT)
Dept: CT IMAGING | Age: 66
Discharge: HOME | End: 2021-06-03
Payer: MEDICARE

## 2021-06-03 ENCOUNTER — HOSPITAL ENCOUNTER (OUTPATIENT)
Age: 66
Discharge: HOME | End: 2021-06-03
Payer: MEDICARE

## 2021-06-03 ENCOUNTER — HOSPITAL ENCOUNTER (OUTPATIENT)
Dept: WOMENS IMAGING | Age: 66
Discharge: HOME | End: 2021-06-03
Payer: MEDICARE

## 2021-06-03 DIAGNOSIS — Z87.891 PERSONAL HISTORY OF TOBACCO USE: ICD-10-CM

## 2021-06-03 DIAGNOSIS — E53.8 B12 DEFICIENCY: ICD-10-CM

## 2021-06-03 DIAGNOSIS — I10 ESSENTIAL HYPERTENSION: ICD-10-CM

## 2021-06-03 DIAGNOSIS — Z12.31 ENCOUNTER FOR SCREENING MAMMOGRAM FOR MALIGNANT NEOPLASM OF BREAST: ICD-10-CM

## 2021-06-03 DIAGNOSIS — R73.02 IGT (IMPAIRED GLUCOSE TOLERANCE): ICD-10-CM

## 2021-06-03 DIAGNOSIS — E53.8 FOLATE DEFICIENCY: ICD-10-CM

## 2021-06-03 LAB
A/G RATIO: 1.5 (ref 1.1–2.2)
ALBUMIN SERPL-MCNC: 4.3 G/DL (ref 3.4–5)
ALP BLD-CCNC: 84 U/L (ref 40–129)
ALT SERPL-CCNC: 34 U/L (ref 10–40)
ANION GAP SERPL CALCULATED.3IONS-SCNC: 11 MMOL/L (ref 3–16)
AST SERPL-CCNC: 27 U/L (ref 15–37)
BILIRUB SERPL-MCNC: 0.6 MG/DL (ref 0–1)
BUN BLDV-MCNC: 17 MG/DL (ref 7–20)
CALCIUM SERPL-MCNC: 10 MG/DL (ref 8.3–10.6)
CHLORIDE BLD-SCNC: 104 MMOL/L (ref 99–110)
CHOLESTEROL, TOTAL: 159 MG/DL (ref 0–199)
CO2: 26 MMOL/L (ref 21–32)
CREAT SERPL-MCNC: 0.6 MG/DL (ref 0.6–1.2)
ESTIMATED AVERAGE GLUCOSE: 119.8 MG/DL
FOLATE: >20 NG/ML (ref 4.78–24.2)
GFR AFRICAN AMERICAN: >60
GFR NON-AFRICAN AMERICAN: >60
GLOBULIN: 2.8 G/DL
GLUCOSE BLD-MCNC: 102 MG/DL (ref 70–99)
HBA1C MFR BLD: 5.8 %
HCT VFR BLD CALC: 45.6 % (ref 36–48)
HDLC SERPL-MCNC: 74 MG/DL (ref 40–60)
HEMOGLOBIN: 15.3 G/DL (ref 12–16)
LDL CHOLESTEROL CALCULATED: 69 MG/DL
MCH RBC QN AUTO: 31.4 PG (ref 26–34)
MCHC RBC AUTO-ENTMCNC: 33.6 G/DL (ref 31–36)
MCV RBC AUTO: 93.4 FL (ref 80–100)
PDW BLD-RTO: 13.7 % (ref 12.4–15.4)
PLATELET # BLD: 256 K/UL (ref 135–450)
PMV BLD AUTO: 8.7 FL (ref 5–10.5)
POTASSIUM SERPL-SCNC: 4.2 MMOL/L (ref 3.5–5.1)
RBC # BLD: 4.88 M/UL (ref 4–5.2)
SODIUM BLD-SCNC: 141 MMOL/L (ref 136–145)
TOTAL PROTEIN: 7.1 G/DL (ref 6.4–8.2)
TRIGL SERPL-MCNC: 80 MG/DL (ref 0–150)
VITAMIN B-12: 870 PG/ML (ref 211–911)
VLDLC SERPL CALC-MCNC: 16 MG/DL
WBC # BLD: 6.6 K/UL (ref 4–11)

## 2021-06-03 PROCEDURE — 83036 HEMOGLOBIN GLYCOSYLATED A1C: CPT

## 2021-06-03 PROCEDURE — 82607 VITAMIN B-12: CPT

## 2021-06-03 PROCEDURE — 82746 ASSAY OF FOLIC ACID SERUM: CPT

## 2021-06-03 PROCEDURE — 85027 COMPLETE CBC AUTOMATED: CPT

## 2021-06-03 PROCEDURE — 80053 COMPREHEN METABOLIC PANEL: CPT

## 2021-06-03 PROCEDURE — 36415 COLL VENOUS BLD VENIPUNCTURE: CPT

## 2021-06-03 PROCEDURE — 71271 CT THORAX LUNG CANCER SCR C-: CPT

## 2021-06-03 PROCEDURE — 80061 LIPID PANEL: CPT

## 2021-06-09 ENCOUNTER — HOSPITAL ENCOUNTER (OUTPATIENT)
Dept: WOMENS IMAGING | Age: 66
Discharge: HOME | End: 2021-06-09
Payer: MEDICARE

## 2021-06-09 DIAGNOSIS — Z12.31 ENCOUNTER FOR SCREENING MAMMOGRAM FOR MALIGNANT NEOPLASM OF BREAST: ICD-10-CM

## 2021-06-09 PROCEDURE — 77067 SCR MAMMO BI INCL CAD: CPT

## 2021-07-01 ENCOUNTER — HOSPITAL ENCOUNTER (EMERGENCY)
Age: 66
Discharge: HOME | End: 2021-07-01
Payer: MEDICARE

## 2021-07-01 ENCOUNTER — APPOINTMENT (OUTPATIENT)
Dept: GENERAL RADIOLOGY | Age: 66
End: 2021-07-01
Payer: MEDICARE

## 2021-07-01 ENCOUNTER — APPOINTMENT (OUTPATIENT)
Dept: CT IMAGING | Age: 66
End: 2021-07-01
Payer: MEDICARE

## 2021-07-01 VITALS
OXYGEN SATURATION: 98 % | HEART RATE: 78 BPM | DIASTOLIC BLOOD PRESSURE: 82 MMHG | WEIGHT: 230 LBS | BODY MASS INDEX: 38.32 KG/M2 | TEMPERATURE: 98.2 F | SYSTOLIC BLOOD PRESSURE: 134 MMHG | RESPIRATION RATE: 18 BRPM | HEIGHT: 65 IN

## 2021-07-01 DIAGNOSIS — S99.922A FOOT INJURY, LEFT, INITIAL ENCOUNTER: Primary | ICD-10-CM

## 2021-07-01 PROCEDURE — 99283 EMERGENCY DEPT VISIT LOW MDM: CPT

## 2021-07-01 PROCEDURE — 73630 X-RAY EXAM OF FOOT: CPT

## 2021-07-01 PROCEDURE — 73700 CT LOWER EXTREMITY W/O DYE: CPT

## 2021-07-01 RX ORDER — HYDROCODONE BITARTRATE AND ACETAMINOPHEN 5; 325 MG/1; MG/1
1 TABLET ORAL EVERY 6 HOURS PRN
Qty: 10 TABLET | Refills: 0 | Status: SHIPPED | OUTPATIENT
Start: 2021-07-01 | End: 2021-07-04

## 2021-07-01 ASSESSMENT — PAIN DESCRIPTION - FREQUENCY: FREQUENCY: INTERMITTENT

## 2021-07-01 ASSESSMENT — ENCOUNTER SYMPTOMS
SHORTNESS OF BREATH: 0
CONSTIPATION: 0
NAUSEA: 0
BACK PAIN: 0
COUGH: 0
DIARRHEA: 0
RESPIRATORY NEGATIVE: 1
VOMITING: 0
COLOR CHANGE: 0
ABDOMINAL PAIN: 0

## 2021-07-01 ASSESSMENT — PAIN DESCRIPTION - DESCRIPTORS: DESCRIPTORS: SORE;SHARP;SHOOTING

## 2021-07-01 ASSESSMENT — PAIN SCALES - GENERAL: PAINLEVEL_OUTOF10: 10

## 2021-07-01 ASSESSMENT — PAIN DESCRIPTION - LOCATION: LOCATION: FOOT

## 2021-07-01 ASSESSMENT — PAIN DESCRIPTION - PAIN TYPE: TYPE: ACUTE PAIN

## 2021-07-01 ASSESSMENT — PAIN DESCRIPTION - ORIENTATION: ORIENTATION: LEFT

## 2021-07-01 NOTE — ED PROVIDER NOTES
[address] ENCOUNTER        Pt Name: Saurabh Ibarra  MRN: [id number]  [place] 1955  Date of evaluation: 7/1/2021  Provider: JONNY Andres  PCP: Clementina Argueta MD  Note Started: 5:39 PM EDT       JADEN. I have evaluated this patient. My supervising physician was available for consultation. CHIEF COMPLAINT       Chief Complaint   Patient presents with    Foot Pain     left foot pain that started this morning while walking, 'felt a big snap and now I cant walk'       HISTORY OF PRESENT ILLNESS   (Location, Timing/Onset, Context/Setting, Quality, Duration, Modifying Factors, Severity, Associated Signs and Symptoms)  Note limiting factors. Chief Complaint: Left Foot Inj     Saurabh Ibarra is a 77 y.o. female with past medical history of hypertension hyperlipidemia who presents the ED implant of a left foot injury. Was walking this morning and states she felt a snap in her left foot to the bottom of her left foot over her arch. Patient states since then she has not been able to walk. Has had some intermittent pain in this area over the past couple days but never this bad. Patient states today she was just walking and states she felt a snap/pop in the foot and has not been able to ambulate since then. Denies any edema, ecchymosis, erythema or warmth. Denies any fever chills. Denies any numbness or tingling. Denies abrasion or laceration. Denies any pain in the calf or ankle. Denies any knee pain. Denies any previous injury or trauma to the area in the past.  Denies taking any over-the-counter medication for symptom control. States pain is sharp in nature rated 10/10 worse with palpation and attempted ambulation. Denies decreased range of motion or strength. Nursing Notes were all reviewed and agreed with or any disagreements were addressed in the HPI.     REVIEW OF SYSTEMS    (2-9 systems for level 4, 10 or more for level 5)     Review of Systems   Constitutional: Negative for activity change, appetite change, chills and fever. Respiratory: Negative. Negative for cough and shortness of breath. Cardiovascular: Negative. Negative for chest pain. Gastrointestinal: Negative for abdominal pain, constipation, diarrhea, nausea and vomiting. Genitourinary: Negative for difficulty urinating and dysuria. Musculoskeletal: Positive for arthralgias, gait problem and myalgias. Negative for back pain, joint swelling, neck pain and neck stiffness. Skin: Negative for color change, pallor, rash and wound. Neurological: Negative for dizziness, weakness, light-headedness and numbness. Positives and Pertinent negatives as per HPI. Except as noted above in the ROS, all other systems were reviewed and negative. PAST MEDICAL HISTORY     Past Medical History:   Diagnosis Date    History of lung cancer     Hyperlipidemia     Hypertension          SURGICAL HISTORY     Past Surgical History:   Procedure Laterality Date    BACK SURGERY  2014    spinal stenosis    LUNG SURGERY  2011    left VATS for stage IA adenocarcinoma         CURRENTMEDICATIONS       Discharge Medication List as of 7/1/2021  5:23 PM      CONTINUE these medications which have NOT CHANGED    Details   Multiple Vitamins-Minerals (CENTRUM SILVER 50+WOMEN PO) Take by mouthHistorical Med      clonazePAM (KLONOPIN) 0.5 MG tablet Take 0.5 tablets by mouth 2 times daily as needed for Anxiety for up to 7 days. , Disp-6 tablet, R-0Normal      meloxicam (MOBIC) 15 MG tablet Take 1 tablet by mouth daily as needed for Pain, Disp-90 tablet, R-3Normal      atorvastatin (LIPITOR) 20 MG tablet TAKE 1 TABLET BY MOUTH DAILY AT BEDTIME, Disp-90 tablet, R-3Normal      venlafaxine (EFFEXOR XR) 37.5 MG extended release capsule Take 1 capsule by mouth daily, Disp-90 capsule,R-3Normal      losartan (COZAAR) 100 MG tablet TAKE 1 TABLET BY MOUTH EVERY DAY, Disp-90 tablet,R-3Normal ALLERGIES     Codeine    FAMILYHISTORY       Family History   Problem Relation Age of Onset   Malia Galeas Cancer Mother         thyroid    High Cholesterol Mother     COPD Sister     No Known Problems Father     Diabetes Maternal Grandfather     No Known Problems Brother     No Known Problems Brother     High Cholesterol Sister           SOCIAL HISTORY       Social History     Tobacco Use    Smoking status: Current Every Day Smoker     Packs/day: 1.00     Years: 50.00     Pack years: 50.00     Types: Cigarettes     Start date: 12/7/1970    Smokeless tobacco: Never Used    Tobacco comment: 2 packs a week   Substance Use Topics    Alcohol use: Not Currently     Comment: stopped a year ago, felt like was drinking too much    Drug use: No       SCREENINGS             PHYSICAL EXAM    (up to 7 for level 4, 8 or more for level 5)     ED Triage Vitals [07/01/21 1424]   BP Temp Temp src Pulse Resp SpO2 Height Weight   134/82 98.2 °F (36.8 °C) -- 78 18 98 % 5' 5\" (1.651 m) 230 lb (104.3 kg)       Physical Exam  Constitutional:       Appearance: She is well-developed. She is not diaphoretic. HENT:      Head: Normocephalic and atraumatic. Right Ear: External ear normal.      Left Ear: External ear normal.   Eyes:      General:         Right eye: No discharge. Left eye: No discharge. Pulmonary:      Effort: Pulmonary effort is normal. No respiratory distress. Breath sounds: No stridor. Musculoskeletal:         General: Normal range of motion. Cervical back: Normal range of motion and neck supple. Comments: Upon examination patient has tender outpatient over the plantar aspect of the left foot over the midfoot. There is no tenderness palpation over the dorsal aspect of the left foot. Achilles tendon intact. Normal [place]. There is no calf tenderness. No palpable cords. No evidence of Baker's cyst.  There is no bony tenderness throughout the foot, ankle or proximal fibula. Dorsalis pedis pulse and capillary refill brisk. Sensation intact to the medial lateral aspects of distal toes. Full range of motion and strength to the toes and able to wiggle toes without difficulty. Full range of motion strength throughout the foot. Full plantar flexion dorsiflexion. Compartments are soft. There is no edema, ecchymosis, erythema or warmth noted. No abrasion or laceration. No rashes or lesions. Gait is deferred. Skin:     General: Skin is warm and dry. Coloration: Skin is not pale. Findings: No erythema. Neurological:      Mental Status: She is alert and oriented to person, place, and time. Psychiatric:         Behavior: Behavior normal.         DIAGNOSTIC RESULTS   LABS:    Labs Reviewed - No data to display    When ordered only abnormal lab results are displayed. All other labs were within normal range or not returned as of this dictation. EKG: When ordered, EKG's are interpreted by the Emergency Department Physician in the absence of a cardiologist.  Please see their note for interpretation of EKG. RADIOLOGY:   Non-plain film images such as CT, Ultrasound and MRI are read by the radiologist. Plain radiographic images are visualized and preliminarily interpreted by the ED Provider with the below findings:        Interpretation per the Radiologist below, if available at the time of this note:    CT FOOT LEFT WO CONTRAST   Preliminary Result   Subtle 1-2 mm foci of density seen between the distal lateral aspect of the   medial cuneiform and the medial base of the 2nd metatarsal along the expected   location of Lisfranc ligament, suspicious for small avulsion fragments. Additional 2 mm focus of mineralization seen along the dorsal lateral   cuneiform, suspicious for additional avulsion fragment. The alignment of the tarsometatarsal joints appear anatomic on this nonstress   study.   If there is a clinical concern for midfoot instability, consider   stress dorsal lateral cuneiform (series 6 image 31), suspicious for additional avulsion fragment. There is a moderate-sized plantar calcaneal spur. Soft Tissue: There is moderate diffuse soft tissue swelling of the foot. No discrete measurable soft tissue mass or drainable fluid collection is seen. Joint: The alignment of the tarsometatarsal joints appear near anatomic on this nonstress study. Mild-to-moderate arthritic changes affect the hallux sesamoid complex. Subtle 1-2 mm foci of density seen between the distal lateral aspect of the medial cuneiform and the medial base of the 2nd metatarsal along the expected location of Lisfranc ligament, suspicious for small avulsion fragments. Additional 2 mm focus of mineralization seen along the dorsal lateral cuneiform, suspicious for additional avulsion fragment. The alignment of the tarsometatarsal joints appear anatomic on this nonstress study. If there is a clinical concern for midfoot instability, consider stress view of the foot radiograph. PROCEDURES   Unless otherwise noted below, none     Procedures    CRITICAL CARE TIME   N/A    CONSULTS:  None      EMERGENCY DEPARTMENT COURSE and DIFFERENTIAL DIAGNOSIS/MDM:   Vitals:    Vitals:    07/01/21 1424   BP: 134/82   Pulse: 78   Resp: 18   Temp: 98.2 °F (36.8 °C)   SpO2: 98%   Weight: 230 lb (104.3 kg)   Height: 5' 5\" (1.651 m)       Patient was given the following medications:  Medications - No data to display        Patient is a 76-year-old female who presents the implant of a left foot injury. Was walking and felt a pop/snap in her left foot over the arch to the plantar aspect and has not been able to ambulate since then. X-ray ordered here in the ED and showed possible fracture to the proximal fourth metatarsal.  Given patient's pain over the midfoot with possible fracture to the proximal fourth metatarsal concern for potential Lisfranc injury.   CT of the foot obtained and showed 1 to 2 mm foci of density between the distal lateral aspect of the medial cuneiform and the medial base of the second metatarsal along the expected location of the Lisfranc ligament concerning for small avulsion fragment. Additional 2 mm focus along the dorsal lateral cuneiform suspicious for additional avulsion fragment. Given history and physical examination concern for potential Lisfranc injury/left foot injury at this time. Case discussed orthopedic nurse practitioner, Neema Wilson, who requested nonweightbearing status and tall walking boot. Patient ordered tall walking boot here in the ED instructed on nonweightbearing status. Did order walker here in the ED but unfortunately walker not available. Patient HR has a walker at home and instructed to utilize walker at home and remain nonweightbearing. Will follow up with orthopedic surgery. Appointment scheduled for tomorrow. Did write small prescription for pain medication for home. FINAL IMPRESSION      1. Foot injury, left, initial encounter          DISPOSITION/PLAN   DISPOSITION Decision To Discharge 07/01/2021 04:53:01 PM      PATIENT REFERRED TO:  [facility] Emergency Department  [facility]  [address] [place] [address]  Go to   As needed, If symptoms worsen    Lea Bruce MD  [address] [place] [address]  [phone]    Go to  8AM tomorrow, 7/2/21      DISCHARGE MEDICATIONS:  Discharge Medication List as of 7/1/2021  5:23 PM      START taking these medications    Details   HYDROcodone-acetaminophen (NORCO) 5-325 MG per tablet Take 1 tablet by mouth every 6 hours as needed for Pain for up to 3 days. , Disp-10 tablet, R-0Print             DISCONTINUED MEDICATIONS:  Discharge Medication List as of 7/1/2021  5:23 PM                 (Please note that portions of this note were completed with a voice recognition program.  Efforts were made to edit the dictations but occasionally words are mis-transcribed.)    JONNY Bryson (electronically signed)          JONNY Alexandre  07/01/21 2007

## 2021-07-02 ENCOUNTER — OFFICE VISIT (OUTPATIENT)
Dept: ORTHOPEDIC SURGERY | Age: 66
End: 2021-07-02
Payer: MEDICARE

## 2021-07-02 VITALS — BODY MASS INDEX: 39.15 KG/M2 | WEIGHT: 235 LBS | HEIGHT: 65 IN

## 2021-07-02 DIAGNOSIS — S92.345A NONDISPLACED FRACTURE OF FOURTH METATARSAL BONE, LEFT FOOT, INITIAL ENCOUNTER FOR CLOSED FRACTURE: Primary | ICD-10-CM

## 2021-07-02 DIAGNOSIS — S92.242A CLOSED DISPLACED FRACTURE OF MEDIAL CUNEIFORM OF LEFT FOOT, INITIAL ENCOUNTER: ICD-10-CM

## 2021-07-02 PROCEDURE — G8417 CALC BMI ABV UP PARAM F/U: HCPCS | Performed by: ORTHOPAEDIC SURGERY

## 2021-07-02 PROCEDURE — 1123F ACP DISCUSS/DSCN MKR DOCD: CPT | Performed by: ORTHOPAEDIC SURGERY

## 2021-07-02 PROCEDURE — 4040F PNEUMOC VAC/ADMIN/RCVD: CPT | Performed by: ORTHOPAEDIC SURGERY

## 2021-07-02 PROCEDURE — 1090F PRES/ABSN URINE INCON ASSESS: CPT | Performed by: ORTHOPAEDIC SURGERY

## 2021-07-02 PROCEDURE — 99204 OFFICE O/P NEW MOD 45 MIN: CPT | Performed by: ORTHOPAEDIC SURGERY

## 2021-07-02 PROCEDURE — 3017F COLORECTAL CA SCREEN DOC REV: CPT | Performed by: ORTHOPAEDIC SURGERY

## 2021-07-02 PROCEDURE — G8399 PT W/DXA RESULTS DOCUMENT: HCPCS | Performed by: ORTHOPAEDIC SURGERY

## 2021-07-02 PROCEDURE — 4004F PT TOBACCO SCREEN RCVD TLK: CPT | Performed by: ORTHOPAEDIC SURGERY

## 2021-07-02 PROCEDURE — G8427 DOCREV CUR MEDS BY ELIG CLIN: HCPCS | Performed by: ORTHOPAEDIC SURGERY

## 2021-07-02 NOTE — PROGRESS NOTES
ORTHOPAEDIC NEW PATIENT NOTE    Chief Complaint   Patient presents with    Foot Pain     New left foot       HPI   7/2/21  77 y.o. female seen for evaluation of left foot injury    She reports she went to lunch yesterday with her family, and was just walking when she felt a pain/snap yesterday. There was no injury  The pain is located in the heel region as well as the lateral midfoot region. There is no swelling or bruising  She does have history of a left fourth toe fracture couple years ago that was treated nonoperatively  Pain is worse with pressure and weightbearing  It is improved with nonweightbearing, rest, and the boot    She does smoke approximately half a pack a day  Osteopenia on DEXA last year    I have reviewed and discussed the below pain assessment findings with the patient. Pain Assessment  Location of Pain: Foot  Location Modifiers: Left  Severity of Pain: 3  Quality of Pain: Other (Comment) (Stabbing)  Duration of Pain: Persistent  Frequency of Pain: Constant  Date Pain First Started: 07/01/21  Aggravating Factors: Walking  Limiting Behavior: Yes  Relieving Factors: Rest  Result of Injury: No  Work-Related Injury: No  Are there other pain locations you wish to document?: No    Review of Systems  I have read over the ROS from the Patient History Form dated on 7/2/2021  Pertinent positives include peripheral edema, depression  Rest of 13 point ROS otherwise negative except per HPI, and scanned into the patient's chart under the Media tab. Allergies   Allergen Reactions    Codeine Other (See Comments)     Wasn't able to speak or move        Current Outpatient Medications   Medication Sig Dispense Refill    HYDROcodone-acetaminophen (NORCO) 5-325 MG per tablet Take 1 tablet by mouth every 6 hours as needed for Pain for up to 3 days.  10 tablet 0    Multiple Vitamins-Minerals (CENTRUM SILVER 50+WOMEN PO) Take by mouth      meloxicam (MOBIC) 15 MG tablet Take 1 tablet by mouth daily as needed for Pain 90 tablet 3    atorvastatin (LIPITOR) 20 MG tablet TAKE 1 TABLET BY MOUTH DAILY AT BEDTIME 90 tablet 3    venlafaxine (EFFEXOR XR) 37.5 MG extended release capsule Take 1 capsule by mouth daily 90 capsule 3    losartan (COZAAR) 100 MG tablet TAKE 1 TABLET BY MOUTH EVERY DAY 90 tablet 3    clonazePAM (KLONOPIN) 0.5 MG tablet Take 0.5 tablets by mouth 2 times daily as needed for Anxiety for up to 7 days. 6 tablet 0     No current facility-administered medications for this visit.        Past Medical History:   Diagnosis Date    History of lung cancer     Hyperlipidemia     Hypertension         Past Surgical History:   Procedure Laterality Date    BACK SURGERY  2014    spinal stenosis    LUNG SURGERY  2011    left VATS for stage IA adenocarcinoma       Family History   Problem Relation Age of Onset    Cancer Mother         thyroid    High Cholesterol Mother     COPD Sister     No Known Problems Father     Diabetes Maternal Grandfather     No Known Problems Brother     No Known Problems Brother     High Cholesterol Sister          Social History     Socioeconomic History    Marital status:      Spouse name: Not on file    Number of children: Not on file    Years of education: Not on file    Highest education level: Not on file   Occupational History    Not on file   Tobacco Use    Smoking status: Current Every Day Smoker     Packs/day: 0.50     Years: 50.00     Pack years: 25.00     Types: Cigarettes     Start date: 12/7/1970    Smokeless tobacco: Never Used    Tobacco comment: 2 packs a week   Substance and Sexual Activity    Alcohol use: Not Currently     Comment: stopped a year ago, felt like was drinking too much    Drug use: No    Sexual activity: Yes     Partners: Male     Birth control/protection: Post-menopausal   Other Topics Concern    Not on file   Social History Narrative    Not on file     Social Determinants of Health     Financial Resource Strain:    [place] Difficulty of Paying Living Expenses:    Food Insecurity:     Worried About Running Out of Food in the Last Year:     [address] in the Last Year:    Transportation Needs:     Lack of Transportation (Medical):  Lack of Transportation (Non-Medical):    Physical Activity:     Days of Exercise per Week:     Minutes of Exercise per Session:    Stress:     Feeling of Stress :    Social Connections:     Frequency of Communication with Friends and Family:     Frequency of Social Gatherings with Friends and Family:     Attends Anabaptism Services:     Active Member of Clubs or Organizations:     Attends Club or Organization Meetings:     Marital Status:    Intimate Partner Violence:     Fear of Current or Ex-Partner:     Emotionally Abused:     Physically Abused:     Sexually Abused:         Vitals:    07/02/21 0747   Weight: 235 lb (106.6 kg)   Height: 5' 5\" (1.651 m)       Physical Exam  Constitutional  well-groomed, well-nourished, Body mass index is 39.11 kg/m².   Psychiatric  pleasant, normal mood & affect  Cardiovascular  RRR, negative peripheral edema, dorsalis pedis pulse 2+  Respiratory  respirations unlabored, on room air; mask on  Skin  no rashes, wounds, or lesions seen on exposed skin  Neurological - LLE SILT SP/DP/T/sural/saphenous nerve distributions; EHL/FHL/TA/GS intact  Left foot/ankle - no obvious deformity, swelling, ecchymosis   There is no plantar ecchymosis   At rest/nonweightbearing, preserved medial longitudinal arch   Mild tenderness to palpation lateral midfoot region   No significant tenderness to palpation Lisfranc joint   Ankle AROM DF/PF maintained   Wiggles all toes up and down      Imaging:  Images were personally reviewed by myself and discussed with the patient   Narrative   EXAMINATION:   THREE XRAY VIEWS OF THE LEFT FOOT       7/1/2021 2:54 pm       COMPARISON:   None.       HISTORY:   ORDERING SYSTEM PROVIDED HISTORY: inj   TECHNOLOGIST PROVIDED HISTORY: Reason for exam:->inj   Reason for Exam: left foot pain that started this morning while walking,   'felt a big snap and now I cant walk'   Acuity: Acute   Type of Exam: Initial       FINDINGS:   Anatomic alignment.  Joint spaces appear normal.  Possible fracture proximal   4th metatarsal.  No other fracture identified.           Impression   Possible fracture proximal 4th metatarsal           Narrative   EXAMINATION:   CT OF THE LEFT FOOT WITHOUT CONTRAST 7/1/2021 3:45 pm       TECHNIQUE:   CT of the left foot was performed without the administration of intravenous   contrast.  Multiplanar reformatted images are provided for review. Dose   modulation, iterative reconstruction, and/or weight based adjustment of the   mA/kV was utilized to reduce the radiation dose to as low as reasonably   achievable.       COMPARISON:   Foot radiograph obtained on the same day       HISTORY   ORDERING SYSTEM PROVIDED HISTORY: injury - inability to ambulate-Lisfranc   inj???   TECHNOLOGIST PROVIDED HISTORY:   Reason for injury-inability to ambulate-Lisfranc inj??? Decision Support Exception - unselect if not a suspected or confirmed   emergency medical condition->Emergency Medical Condition (MA)   Reason for Exam: injury - inability to ambulate-Lisfranc inj??? Acuity: Unknown   Type of Exam: Unknown       FINDINGS:   Bones: There are subtle 1-2 mm foci of density seen between the distal   lateral aspect of the medial cuneiform and the medial base of the 2nd   metatarsal along the expected location of the Lisfranc ligament, which may   reflect small avulsion fragments (series 2 image 27, 28). Hali Cheeks is also a 2   mm focus of mineralization seen along the dorsal lateral cuneiform (series 6   image 31), suspicious for additional avulsion fragment. Hali Cheeks is a   moderate-sized plantar calcaneal spur.       Soft Tissue:  There is moderate diffuse soft tissue swelling of the foot.  No   discrete measurable soft tissue mass or drainable fluid collection is seen.       Joint: The alignment of the tarsometatarsal joints appear near anatomic on   this nonstress study.  Mild-to-moderate arthritic changes affect the hallux   sesamoid complex.           Impression   Subtle 1-2 mm foci of density seen between the distal lateral aspect of the   medial cuneiform and the medial base of the 2nd metatarsal along the expected   location of Lisfranc ligament, suspicious for small avulsion fragments.       Additional 2 mm focus of mineralization seen along the dorsal lateral   cuneiform, suspicious for additional avulsion fragment.       The alignment of the tarsometatarsal joints appear anatomic on this nonstress   study.  If there is a clinical concern for midfoot instability, consider   stress view of the foot radiograph.           Left foot 3 views repeated today in clinic - Weightbearing stress views -  No significant widening of the Lisfranc interval, maybe ~1mm side to side difference. No dorsal subluxation seen on the lateral view either. Narrative   EXAMINATION:   BONE DENSITOMETRY       6/3/2020 7:51 am       TECHNIQUE:   A bone density DEXA scan was performed of the left hip and left forearm on a   Capricor system.       COMPARISON:   None.       HISTORY:   ORDERING SYSTEM PROVIDED HISTORY: Post-menopausal       FINDINGS:   LEFT FOREARM:       The BMD of the middle third of the radius of the distal forearm equals 0.703   g/cm2. The T- and Z-scores are 0.2 and 1.9 standard deviations from the mean.    This is within the normal range by WHO criteria.       LEFT HIP:       The bone mineral density in the total hip is measured at 0.801 g/cm2   corresponding to a T-score of -1.2 and a Z-score of 0.2.  This is within the   osteopenia range by [facility] criteria.       The bone mineral density of the femoral neck is measured at 0.664 g/cm2   corresponding to a T-score of -1.7 and a Z-score of -0.1.  This is within the   osteopenia range by [facility] criteria.       WHO fracture risk assessment tool (FRAX) projects a 10-year fracture risk of   a major osteoporotic fracture at 8.7% and hip fracture at 1.0%.       The patient's 10-year fracture risk is increased if untreated.  Fracture   probability may be lower if the patient has received treatment.  FRAX may   underestimate fracture probability in patients who have impaired functional   status from rheumatoid arthritis, history of frequent falls, history of   multiple fractures, severe vertebral fractures, parental history of non-hip   fragility fractures, glucocorticoid doses in excess of 7.5 mg/day or frequent   use, high dose inhaled glucocorticoids, and if the T-score of the lumbar   spine is > 1 SD lower than the femoral neck.  Clinical judgment and/or   patient preferences may indicate treatment for people with 10 year fracture   probabilities above or below these levels.           Impression   Osteopenia by WHO criteria.             Assessment & Plan:  77 y.o. female who presents with    Diagnosis Orders   1. Nondisplaced fracture of fourth metatarsal bone, left foot, initial encounter for closed fracture  XR FOOT LEFT (MIN 3 VIEWS)   2. Closed avulsion fracture of medial cuneiform of left foot, initial encounter         No orders of the defined types were placed in this encounter.       Tender to palpation mainly over the lateral midfoot region  Radiographs show irregularity of the fourth metatarsal base  CT scan shows irregularity of the fourth metatarsal base as well, however this was not read by the radiologist    Weightbearing stress radiographs were performed today which show no significant widening  There was no injury mechanism, she was just walking  She has no significant swelling or bruising on exam either, which would be expected in the setting of an acute Lisfranc injury    Therefore, I recommended conservative treatment for this at this time  Boot, nonweightbearing versus protected weightbearing    I would like to see her back in 2 weeks for repeat weightbearing x-rays    She is in agreement with this plan    I counseled her on the importance of tobacco cessation to aid in bone/fracture healing. Tobacco use is also associated with worse pain and worse functional outcomes. Jil Vuong MD

## 2021-07-16 ENCOUNTER — OFFICE VISIT (OUTPATIENT)
Dept: ORTHOPEDIC SURGERY | Age: 66
End: 2021-07-16
Payer: MEDICARE

## 2021-07-16 VITALS — BODY MASS INDEX: 39.15 KG/M2 | RESPIRATION RATE: 14 BRPM | HEIGHT: 65 IN | WEIGHT: 235 LBS

## 2021-07-16 DIAGNOSIS — S92.345A NONDISPLACED FRACTURE OF FOURTH METATARSAL BONE, LEFT FOOT, INITIAL ENCOUNTER FOR CLOSED FRACTURE: Primary | ICD-10-CM

## 2021-07-16 DIAGNOSIS — S92.242A CLOSED DISPLACED FRACTURE OF MEDIAL CUNEIFORM OF LEFT FOOT, INITIAL ENCOUNTER: ICD-10-CM

## 2021-07-16 PROCEDURE — 4004F PT TOBACCO SCREEN RCVD TLK: CPT | Performed by: ORTHOPAEDIC SURGERY

## 2021-07-16 PROCEDURE — 1123F ACP DISCUSS/DSCN MKR DOCD: CPT | Performed by: ORTHOPAEDIC SURGERY

## 2021-07-16 PROCEDURE — G8427 DOCREV CUR MEDS BY ELIG CLIN: HCPCS | Performed by: ORTHOPAEDIC SURGERY

## 2021-07-16 PROCEDURE — G8417 CALC BMI ABV UP PARAM F/U: HCPCS | Performed by: ORTHOPAEDIC SURGERY

## 2021-07-16 PROCEDURE — 3017F COLORECTAL CA SCREEN DOC REV: CPT | Performed by: ORTHOPAEDIC SURGERY

## 2021-07-16 PROCEDURE — G8399 PT W/DXA RESULTS DOCUMENT: HCPCS | Performed by: ORTHOPAEDIC SURGERY

## 2021-07-16 PROCEDURE — 99213 OFFICE O/P EST LOW 20 MIN: CPT | Performed by: ORTHOPAEDIC SURGERY

## 2021-07-16 PROCEDURE — 1090F PRES/ABSN URINE INCON ASSESS: CPT | Performed by: ORTHOPAEDIC SURGERY

## 2021-07-16 PROCEDURE — 4040F PNEUMOC VAC/ADMIN/RCVD: CPT | Performed by: ORTHOPAEDIC SURGERY

## 2021-08-13 ENCOUNTER — OFFICE VISIT (OUTPATIENT)
Dept: ORTHOPEDIC SURGERY | Age: 66
End: 2021-08-13
Payer: MEDICARE

## 2021-08-13 VITALS — HEIGHT: 65 IN | BODY MASS INDEX: 39.42 KG/M2 | WEIGHT: 236.6 LBS | RESPIRATION RATE: 16 BRPM

## 2021-08-13 DIAGNOSIS — S92.242A CLOSED DISPLACED FRACTURE OF MEDIAL CUNEIFORM OF LEFT FOOT, INITIAL ENCOUNTER: ICD-10-CM

## 2021-08-13 DIAGNOSIS — S92.345A NONDISPLACED FRACTURE OF FOURTH METATARSAL BONE, LEFT FOOT, INITIAL ENCOUNTER FOR CLOSED FRACTURE: Primary | ICD-10-CM

## 2021-08-13 PROCEDURE — 99212 OFFICE O/P EST SF 10 MIN: CPT | Performed by: ORTHOPAEDIC SURGERY

## 2021-08-13 PROCEDURE — 4040F PNEUMOC VAC/ADMIN/RCVD: CPT | Performed by: ORTHOPAEDIC SURGERY

## 2021-08-13 PROCEDURE — 1123F ACP DISCUSS/DSCN MKR DOCD: CPT | Performed by: ORTHOPAEDIC SURGERY

## 2021-08-13 PROCEDURE — 4004F PT TOBACCO SCREEN RCVD TLK: CPT | Performed by: ORTHOPAEDIC SURGERY

## 2021-08-13 PROCEDURE — 3017F COLORECTAL CA SCREEN DOC REV: CPT | Performed by: ORTHOPAEDIC SURGERY

## 2021-08-13 PROCEDURE — G8427 DOCREV CUR MEDS BY ELIG CLIN: HCPCS | Performed by: ORTHOPAEDIC SURGERY

## 2021-08-13 PROCEDURE — G8399 PT W/DXA RESULTS DOCUMENT: HCPCS | Performed by: ORTHOPAEDIC SURGERY

## 2021-08-13 PROCEDURE — G8417 CALC BMI ABV UP PARAM F/U: HCPCS | Performed by: ORTHOPAEDIC SURGERY

## 2021-08-13 PROCEDURE — 1090F PRES/ABSN URINE INCON ASSESS: CPT | Performed by: ORTHOPAEDIC SURGERY

## 2021-08-13 NOTE — PROGRESS NOTES
ORTHOPAEDIC NEW PATIENT NOTE    Chief Complaint   Patient presents with    Follow-up     left foot        Foot Pain     8/13/2021  Jeannine Mcdonough returns to clinic today for her left foot  She stopped using her boot about a week or 2 ago  She is full weightbearing in regular shoes/sandals  She has no pain whatsoever       7/16/21  Jeannine Mcdonough returns to clinic today for follow-up of her left foot  She reports left foot is overall doing well  It is feeling better than before  Not requiring any over-the-counter medicines or pain medicines  Is using her boot  No swelling or bruising  Denies N/T      7/2/21  77 y.o. female seen for evaluation of left foot injury    She reports she went to lunch yesterday with her family, and was just walking when she felt a pain/snap yesterday. There was no injury  The pain is located in the heel region as well as the lateral midfoot region. There is no swelling or bruising  She does have history of a left fourth toe fracture couple years ago that was treated nonoperatively  Pain is worse with pressure and weightbearing  It is improved with nonweightbearing, rest, and the boot    She does smoke approximately half a pack a day  Osteopenia on DEXA last year      Allergies   Allergen Reactions    Codeine Other (See Comments)     Wasn't able to speak or move        Current Outpatient Medications   Medication Sig Dispense Refill    Multiple Vitamins-Minerals (CENTRUM SILVER 50+WOMEN PO) Take by mouth      clonazePAM (KLONOPIN) 0.5 MG tablet Take 0.5 tablets by mouth 2 times daily as needed for Anxiety for up to 7 days.  6 tablet 0    meloxicam (MOBIC) 15 MG tablet Take 1 tablet by mouth daily as needed for Pain 90 tablet 3    atorvastatin (LIPITOR) 20 MG tablet TAKE 1 TABLET BY MOUTH DAILY AT BEDTIME 90 tablet 3    venlafaxine (EFFEXOR XR) 37.5 MG extended release capsule Take 1 capsule by mouth daily 90 capsule 3    losartan (COZAAR) 100 MG tablet TAKE 1 TABLET BY MOUTH EVERY DAY 90 tablet 3 No current facility-administered medications for this visit. Past Medical History:   Diagnosis Date    History of lung cancer     Hyperlipidemia     Hypertension         Past Surgical History:   Procedure Laterality Date    BACK SURGERY  2014    spinal stenosis    LUNG SURGERY  2011    left VATS for stage IA adenocarcinoma       Family History   Problem Relation Age of Onset    Cancer Mother         thyroid    High Cholesterol Mother     COPD Sister     No Known Problems Father     Diabetes Maternal Grandfather     No Known Problems Brother     No Known Problems Brother     High Cholesterol Sister          Social History     Socioeconomic History    Marital status:      Spouse name: Not on file    Number of children: Not on file    Years of education: Not on file    Highest education level: Not on file   Occupational History    Not on file   Tobacco Use    Smoking status: Current Every Day Smoker     Packs/day: 0.50     Years: 50.00     Pack years: 25.00     Types: Cigarettes     Start date: 12/7/1970    Smokeless tobacco: Never Used    Tobacco comment: 2 packs a week   Substance and Sexual Activity    Alcohol use: Not Currently     Comment: stopped a year ago, felt like was drinking too much    Drug use: No    Sexual activity: Yes     Partners: Male     Birth control/protection: Post-menopausal   Other Topics Concern    Not on file   Social History Narrative    Not on file     Social Determinants of Health     Financial Resource Strain:     Difficulty of Paying Living Expenses:    Food Insecurity:     Worried About Running Out of Food in the Last Year:     Ran Out of Food in the Last Year:    Transportation Needs:     Lack of Transportation (Medical):      Lack of Transportation (Non-Medical):    Physical Activity:     Days of Exercise per Week:     Minutes of Exercise per Session:    Stress:     Feeling of Stress :    Social Connections:     Frequency of Communication with Friends and Family:     Frequency of Social Gatherings with Friends and Family:     Attends Buddhism Services:     Active Member of Clubs or Organizations:     Attends Club or Organization Meetings:     Marital Status:    Intimate Partner Violence:     Fear of Current or Ex-Partner:     Emotionally Abused:     Physically Abused:     Sexually Abused:         Vitals:    08/13/21 1048   Resp: 16   Weight: 236 lb 9.6 oz (107.3 kg)   Height: 5' 5\" (1.651 m)       Physical Exam  Constitutional  well-groomed, well-nourished, Body mass index is 39.37 kg/m².   Psychiatric  pleasant, normal mood & affect  Cardiovascular  RRR, negative peripheral edema, dorsalis pedis pulse 2+  Respiratory  respirations unlabored, on room air; mask on  Neurological - LLE SILT SP/DP/T/sural/saphenous nerve distributions; EHL/FHL/TA/GS intact  Left foot/ankle - no deformity, swelling, ecchymosis   There is no plantar ecchymosis   At rest/nonweightbearing, preserved medial longitudinal arch   No tenderness to palpation lateral midfoot region   No tenderness to palpation Lisfranc joint   No tenderness to palpation anywhere   Ankle AROM DF/PF maintained   Active inversion/eversion intact as well        Imaging:  Images were personally reviewed by myself and discussed with the patient   Narrative   EXAMINATION:   THREE XRAY VIEWS OF THE LEFT FOOT       7/1/2021 2:54 pm       COMPARISON:   None.       HISTORY:   ORDERING SYSTEM PROVIDED HISTORY: inj   TECHNOLOGIST PROVIDED HISTORY:   Reason for exam:->inj   Reason for Exam: left foot pain that started this morning while walking,   'felt a big snap and now I cant walk'   Acuity: Acute   Type of Exam: Initial       FINDINGS:   Anatomic alignment.  Joint spaces appear normal.  Possible fracture proximal   4th metatarsal.  No other fracture identified.           Impression   Possible fracture proximal 4th metatarsal           Narrative   EXAMINATION:   CT OF THE LEFT FOOT WITHOUT CONTRAST 7/1/2021 3:45 pm       TECHNIQUE:   CT of the left foot was performed without the administration of intravenous   contrast.  Multiplanar reformatted images are provided for review. Dose   modulation, iterative reconstruction, and/or weight based adjustment of the   mA/kV was utilized to reduce the radiation dose to as low as reasonably   achievable.       COMPARISON:   Foot radiograph obtained on the same day       HISTORY   ORDERING SYSTEM PROVIDED HISTORY: injury - inability to ambulate-Lisfranc   inj???   TECHNOLOGIST PROVIDED HISTORY:   Reason for injury-inability to ambulate-Lisfranc inj??? Decision Support Exception - unselect if not a suspected or confirmed   emergency medical condition->Emergency Medical Condition (MA)   Reason for Exam: injury - inability to ambulate-Lisfranc inj??? Acuity: Unknown   Type of Exam: Unknown       FINDINGS:   Bones: There are subtle 1-2 mm foci of density seen between the distal   lateral aspect of the medial cuneiform and the medial base of the 2nd   metatarsal along the expected location of the Lisfranc ligament, which may   reflect small avulsion fragments (series 2 image 27, 28). Kelsey Seller is also a 2   mm focus of mineralization seen along the dorsal lateral cuneiform (series 6   image 31), suspicious for additional avulsion fragment. Kelsey Seller is a   moderate-sized plantar calcaneal spur.       Soft Tissue:  There is moderate diffuse soft tissue swelling of the foot.  No   discrete measurable soft tissue mass or drainable fluid collection is seen.       Joint: The alignment of the tarsometatarsal joints appear near anatomic on   this nonstress study.  Mild-to-moderate arthritic changes affect the hallux   sesamoid complex.           Impression   Subtle 1-2 mm foci of density seen between the distal lateral aspect of the   medial cuneiform and the medial base of the 2nd metatarsal along the expected   location of Lisfranc ligament, suspicious for small avulsion fragments.       Additional 2 mm focus of mineralization seen along the dorsal lateral   cuneiform, suspicious for additional avulsion fragment.       The alignment of the tarsometatarsal joints appear anatomic on this nonstress   study.  If there is a clinical concern for midfoot instability, consider   stress view of the foot radiograph.           Left foot 3 views repeated today in clinic for serial follow-up imaging - Weightbearing stress views -  There is no widening of the Lisfranc interval.  No dorsal subluxation seen on the lateral view either. No definite fractures are visualized. Assessment & Plan:  77 y.o. female who presents with    Diagnosis Orders   1. Nondisplaced fracture of fourth metatarsal bone, left foot, initial encounter for closed fracture  XR FOOT LEFT (MIN 3 VIEWS)   2. Closed displaced fracture of medial cuneiform of left foot, initial encounter  XR FOOT LEFT (MIN 3 VIEWS)       No orders of the defined types were placed in this encounter.       Gavin Lagos is doing well  Repeat stress radiographs today remain negative, look good  She is already full weightbearing in regular shoes/sandals  She has no pain  Exam is good    Activities as tolerated  Follow-up with any issues      Christy Kim MD

## 2021-08-17 RX ORDER — MELOXICAM 15 MG/1
15 TABLET ORAL DAILY PRN
Qty: 90 TABLET | Refills: 3 | Status: SHIPPED | OUTPATIENT
Start: 2021-08-17 | End: 2022-05-09 | Stop reason: ALTCHOICE

## 2021-08-17 RX ORDER — ATORVASTATIN CALCIUM 20 MG/1
TABLET, FILM COATED ORAL
Qty: 90 TABLET | Refills: 3 | Status: ON HOLD | OUTPATIENT
Start: 2021-08-17 | End: 2021-09-07 | Stop reason: HOSPADM

## 2021-08-17 RX ORDER — VENLAFAXINE HYDROCHLORIDE 37.5 MG/1
37.5 CAPSULE, EXTENDED RELEASE ORAL DAILY
Qty: 90 CAPSULE | Refills: 3 | Status: SHIPPED | OUTPATIENT
Start: 2021-08-17 | End: 2022-08-04 | Stop reason: SDUPTHER

## 2021-08-17 RX ORDER — LOSARTAN POTASSIUM 100 MG/1
TABLET ORAL
Qty: 90 TABLET | Refills: 3 | Status: ON HOLD | OUTPATIENT
Start: 2021-08-17 | End: 2021-09-07 | Stop reason: HOSPADM

## 2021-08-17 NOTE — TELEPHONE ENCOUNTER
Medication:   Requested Prescriptions     Pending Prescriptions Disp Refills    venlafaxine (EFFEXOR XR) 37.5 MG extended release capsule 90 capsule 3     Sig: Take 1 capsule by mouth daily    atorvastatin (LIPITOR) 20 MG tablet 90 tablet 3     Sig: TAKE 1 TABLET BY MOUTH DAILY AT BEDTIME    losartan (COZAAR) 100 MG tablet 90 tablet 3     Sig: TAKE 1 TABLET BY MOUTH EVERY DAY    meloxicam (MOBIC) 15 MG tablet 90 tablet 3     Sig: Take 1 tablet by mouth daily as needed for Pain          Patient Phone Number: [phone] (home)     Last appt: 5/4/2021   Next appt: Visit date not found    Last OARRS: No flowsheet data found.   PDMP Monitoring:    Last PDMP Shantal Lopez as Reviewed [facility]):  Review User Review Instant Review Result   Meme Blank 1874 5/4/2021 10:58 AM Reviewed PDMP [1]     Preferred Pharmacy:   [facility] [address] [facility],[address], [address] [facility] [phone]  F [phone]  [address] [place] [address]  [address] [place] [address]  Phone: [phone] Fax: [phone]    Liana Gonzales [address], [facility] [phone] -  [phone]  [facility] [address] [address]  Phone: [phone] Fax: Pamela [address], [address] [phone] [facility] [phone]  [facility] [address]  Phone: [phone] Fax: [phone]

## 2021-08-17 NOTE — TELEPHONE ENCOUNTER
venlafaxine (EFFEXOR XR) 37.5 MG extended release capsule [[id number]    atorvastatin (LIPITOR) 20 MG tablet [[id number]        losartan (COZAAR) 100 MG tablet [[id number]          meloxicam (MOBIC) 15 MG tablet [[id number]                      [facility] [address] [facility],[address], [address] [place] [id number]   Phone:  [phone]  Fax:  [phone]

## 2021-08-23 ENCOUNTER — NURSE TRIAGE (OUTPATIENT)
Dept: OTHER | Facility: CLINIC | Age: 66
End: 2021-08-23

## 2021-08-23 ENCOUNTER — OFFICE VISIT (OUTPATIENT)
Dept: FAMILY MEDICINE CLINIC | Age: 66
End: 2021-08-23
Payer: MEDICARE

## 2021-08-23 VITALS — OXYGEN SATURATION: 97 % | TEMPERATURE: 98.2 F | HEART RATE: 79 BPM

## 2021-08-23 DIAGNOSIS — J45.41 ASTHMATIC BRONCHITIS WITH EXACERBATION, MODERATE PERSISTENT: Primary | ICD-10-CM

## 2021-08-23 PROCEDURE — 1123F ACP DISCUSS/DSCN MKR DOCD: CPT | Performed by: FAMILY MEDICINE

## 2021-08-23 PROCEDURE — 99214 OFFICE O/P EST MOD 30 MIN: CPT | Performed by: FAMILY MEDICINE

## 2021-08-23 PROCEDURE — G8417 CALC BMI ABV UP PARAM F/U: HCPCS | Performed by: FAMILY MEDICINE

## 2021-08-23 PROCEDURE — 4004F PT TOBACCO SCREEN RCVD TLK: CPT | Performed by: FAMILY MEDICINE

## 2021-08-23 PROCEDURE — G8399 PT W/DXA RESULTS DOCUMENT: HCPCS | Performed by: FAMILY MEDICINE

## 2021-08-23 PROCEDURE — 4040F PNEUMOC VAC/ADMIN/RCVD: CPT | Performed by: FAMILY MEDICINE

## 2021-08-23 PROCEDURE — 1090F PRES/ABSN URINE INCON ASSESS: CPT | Performed by: FAMILY MEDICINE

## 2021-08-23 PROCEDURE — G8428 CUR MEDS NOT DOCUMENT: HCPCS | Performed by: FAMILY MEDICINE

## 2021-08-23 PROCEDURE — 3017F COLORECTAL CA SCREEN DOC REV: CPT | Performed by: FAMILY MEDICINE

## 2021-08-23 RX ORDER — PREDNISONE 10 MG/1
TABLET ORAL
Qty: 15 TABLET | Refills: 0 | Status: ON HOLD | OUTPATIENT
Start: 2021-08-23 | End: 2021-09-03

## 2021-08-23 RX ORDER — CEFDINIR 300 MG/1
300 CAPSULE ORAL 2 TIMES DAILY
Qty: 14 CAPSULE | Refills: 0 | Status: SHIPPED | OUTPATIENT
Start: 2021-08-23 | End: 2021-08-30

## 2021-08-23 ASSESSMENT — PATIENT HEALTH QUESTIONNAIRE - PHQ9
2. FEELING DOWN, DEPRESSED OR HOPELESS: 0
SUM OF ALL RESPONSES TO PHQ QUESTIONS 1-9: 0
SUM OF ALL RESPONSES TO PHQ QUESTIONS 1-9: 0
1. LITTLE INTEREST OR PLEASURE IN DOING THINGS: 0
SUM OF ALL RESPONSES TO PHQ9 QUESTIONS 1 & 2: 0
SUM OF ALL RESPONSES TO PHQ QUESTIONS 1-9: 0

## 2021-08-23 NOTE — PROGRESS NOTES
Subjective:      Patient ID: Willis Grady is a 77 y.o. female. HPI patient presents with increasing cough and congestion over the weekend. She states she has similar symptoms a year ago and required antibiotic therapy. She has a productive cough at this point of time after originally started with some upper respiratory symptoms. She feels most of the upper respiratory symptoms have resolved. She denies any chills or fever. Patient has been vaccinated for Covid. She denies any loss of taste or smell. No GI symptoms. Review of Systems  Allergies   Allergen Reactions    Codeine Other (See Comments)     Wasn't able to speak or move       Objective:   Physical Exam  Constitutional:       General: She is not in acute distress. HENT:      Right Ear: Tympanic membrane normal.      Left Ear: Tympanic membrane normal.      Nose: Nose normal.      Mouth/Throat:      Pharynx: Uvula midline. Pulmonary:      Effort: Pulmonary effort is normal. No respiratory distress. Breath sounds: Wheezing present. No decreased breath sounds or rhonchi. Musculoskeletal:      Cervical back: Neck supple. Lymphadenopathy:      Cervical: No cervical adenopathy. Neurological:      Mental Status: She is alert. Assessment:      Maki Leonard was seen today for cough. Diagnoses and all orders for this visit:    Asthmatic bronchitis with exacerbation, moderate persistent    Other orders  -     cefdinir (OMNICEF) 300 MG capsule; Take 1 capsule by mouth 2 times daily for 7 days  -     predniSONE (DELTASONE) 10 MG tablet; 1 TID for 3 day then 1 BID            Plan:      Humidification of the bedroom was discussed.   Maintain over-the-counter medication when necessary  RTC when necessary    Medical decision making of low complexity            Gus Navas MD

## 2021-08-23 NOTE — TELEPHONE ENCOUNTER
Reason for Disposition   Wheezing is present    Answer Assessment - Initial Assessment Questions  1. ONSET: \"When did the cough begin? \"       In the last week    2. SEVERITY: \"How bad is the cough today? \"       Cough is a 6 or 7/10- cough is keeping patient up at night    3. RESPIRATORY DISTRESS: \"Describe your breathing. \"       Feels a \"rattle\" in chest, can hear \"wheezing\" when laying down    4. FEVER: \"Do you have a fever? \" If so, ask: \"What is your temperature, how was it measured, and when did it start? \"      Denies    5. HEMOPTYSIS: \"Are you coughing up any blood? \" If so ask: \"How much? \" (flecks, streaks, tablespoons, etc.)     N/A    6. TREATMENT: \"What have you done so far to treat the cough? \" (e.g., meds, fluids, humidifier)      N/A    7. CARDIAC HISTORY: \"Do you have any history of heart disease? \" (e.g., heart attack, congestive heart failure)       HTN, high cholesterol    8. LUNG HISTORY: \"Do you have any history of lung disease? \"  (e.g., pulmonary embolus, asthma, emphysema)      Had upper left lobe of lung removed    9. PE RISK FACTORS: \"Do you have a history of blood clots? \" (or: recent major surgery, recent prolonged travel, bedridden)      Denies    10. OTHER SYMPTOMS: \"Do you have any other symptoms? (e.g., runny nose, wheezing, chest pain)        Congestion, \"rattle\" in upper chest    11. PREGNANCY: \"Is there any chance you are pregnant? \" \"When was your last menstrual period? \"       N/A    12. TRAVEL: \"Have you traveled out of the country in the last month? \" (e.g., travel history, exposures)        N/A    Protocols used: [id number]    Received call from [address] [place] [address] at [facility] with Red Flag Complaint. Brief description of triage: See above. Triage indicates for patient to be seen today. Care advice provided, patient verbalizes understanding; denies any other questions or concerns; instructed to call back for any new or worsening symptoms.     Message sent to [facility] for appointment scheduling. Attention Provider: Thank you for allowing me to participate in the care of your patient. The patient was connected to triage in response to information provided to the E[facility]. Please do not respond through this encounter as the response is not directed to a shared pool.

## 2021-09-03 ENCOUNTER — APPOINTMENT (OUTPATIENT)
Dept: CT IMAGING | Age: 66
DRG: 312 | End: 2021-09-03
Payer: MEDICARE

## 2021-09-03 ENCOUNTER — HOSPITAL ENCOUNTER (INPATIENT)
Age: 66
LOS: 2 days | Discharge: HOME | DRG: 312 | End: 2021-09-07
Attending: EMERGENCY MEDICINE | Admitting: FAMILY MEDICINE
Payer: MEDICARE

## 2021-09-03 DIAGNOSIS — R55 NEAR SYNCOPE: Primary | ICD-10-CM

## 2021-09-03 DIAGNOSIS — R42 DIZZINESS: ICD-10-CM

## 2021-09-03 DIAGNOSIS — I77.1 STENOSIS OF LEFT SUBCLAVIAN ARTERY (HCC): ICD-10-CM

## 2021-09-03 DIAGNOSIS — R94.31 ABNORMAL EKG: ICD-10-CM

## 2021-09-03 LAB
A/G RATIO: 1.5 (ref 1.1–2.2)
ALBUMIN SERPL-MCNC: 4.1 G/DL (ref 3.4–5)
ALP BLD-CCNC: 91 U/L (ref 40–129)
ALT SERPL-CCNC: 29 U/L (ref 10–40)
ANION GAP SERPL CALCULATED.3IONS-SCNC: 9 MMOL/L (ref 3–16)
AST SERPL-CCNC: 22 U/L (ref 15–37)
BASOPHILS ABSOLUTE: 0.1 K/UL (ref 0–0.2)
BASOPHILS RELATIVE PERCENT: 1.3 %
BILIRUB SERPL-MCNC: 0.6 MG/DL (ref 0–1)
BUN BLDV-MCNC: 16 MG/DL (ref 7–20)
CALCIUM SERPL-MCNC: 9.7 MG/DL (ref 8.3–10.6)
CHLORIDE BLD-SCNC: 105 MMOL/L (ref 99–110)
CO2: 28 MMOL/L (ref 21–32)
CREAT SERPL-MCNC: 0.6 MG/DL (ref 0.6–1.2)
EKG ATRIAL RATE: 68 BPM
EKG DIAGNOSIS: NORMAL
EKG P AXIS: 77 DEGREES
EKG P-R INTERVAL: 172 MS
EKG Q-T INTERVAL: 408 MS
EKG QRS DURATION: 102 MS
EKG QTC CALCULATION (BAZETT): 433 MS
EKG R AXIS: 23 DEGREES
EKG T AXIS: 72 DEGREES
EKG VENTRICULAR RATE: 68 BPM
EOSINOPHILS ABSOLUTE: 0.2 K/UL (ref 0–0.6)
EOSINOPHILS RELATIVE PERCENT: 2.4 %
GFR AFRICAN AMERICAN: >60
GFR NON-AFRICAN AMERICAN: >60
GLOBULIN: 2.7 G/DL
GLUCOSE BLD-MCNC: 108 MG/DL (ref 70–99)
HCT VFR BLD CALC: 43.2 % (ref 36–48)
HEMOGLOBIN: 14.3 G/DL (ref 12–16)
LYMPHOCYTES ABSOLUTE: 1.4 K/UL (ref 1–5.1)
LYMPHOCYTES RELATIVE PERCENT: 16.3 %
MCH RBC QN AUTO: 31 PG (ref 26–34)
MCHC RBC AUTO-ENTMCNC: 33.2 G/DL (ref 31–36)
MCV RBC AUTO: 93.5 FL (ref 80–100)
MONOCYTES ABSOLUTE: 0.7 K/UL (ref 0–1.3)
MONOCYTES RELATIVE PERCENT: 8.2 %
NEUTROPHILS ABSOLUTE: 6.1 K/UL (ref 1.7–7.7)
NEUTROPHILS RELATIVE PERCENT: 71.8 %
PDW BLD-RTO: 14.5 % (ref 12.4–15.4)
PLATELET # BLD: 283 K/UL (ref 135–450)
PMV BLD AUTO: 8.1 FL (ref 5–10.5)
POTASSIUM REFLEX MAGNESIUM: 3.8 MMOL/L (ref 3.5–5.1)
RBC # BLD: 4.62 M/UL (ref 4–5.2)
REASON FOR REJECTION: NORMAL
REJECTED TEST: NORMAL
SODIUM BLD-SCNC: 142 MMOL/L (ref 136–145)
TOTAL PROTEIN: 6.8 G/DL (ref 6.4–8.2)
TROPONIN: <0.01 NG/ML
TROPONIN: <0.01 NG/ML
WBC # BLD: 8.5 K/UL (ref 4–11)

## 2021-09-03 PROCEDURE — 99285 EMERGENCY DEPT VISIT HI MDM: CPT

## 2021-09-03 PROCEDURE — 93005 ELECTROCARDIOGRAM TRACING: CPT | Performed by: EMERGENCY MEDICINE

## 2021-09-03 PROCEDURE — 6370000000 HC RX 637 (ALT 250 FOR IP): Performed by: EMERGENCY MEDICINE

## 2021-09-03 PROCEDURE — 84484 ASSAY OF TROPONIN QUANT: CPT

## 2021-09-03 PROCEDURE — 96374 THER/PROPH/DIAG INJ IV PUSH: CPT

## 2021-09-03 PROCEDURE — G0378 HOSPITAL OBSERVATION PER HR: HCPCS

## 2021-09-03 PROCEDURE — 4A03X5D MEASUREMENT OF ARTERIAL FLOW, INTRACRANIAL, EXTERNAL APPROACH: ICD-10-PCS | Performed by: INTERNAL MEDICINE

## 2021-09-03 PROCEDURE — 93010 ELECTROCARDIOGRAM REPORT: CPT | Performed by: INTERNAL MEDICINE

## 2021-09-03 PROCEDURE — 85025 COMPLETE CBC W/AUTO DIFF WBC: CPT

## 2021-09-03 PROCEDURE — 2580000003 HC RX 258: Performed by: FAMILY MEDICINE

## 2021-09-03 PROCEDURE — 70498 CT ANGIOGRAPHY NECK: CPT

## 2021-09-03 PROCEDURE — 36415 COLL VENOUS BLD VENIPUNCTURE: CPT

## 2021-09-03 PROCEDURE — 6360000002 HC RX W HCPCS: Performed by: EMERGENCY MEDICINE

## 2021-09-03 PROCEDURE — 6360000004 HC RX CONTRAST MEDICATION: Performed by: EMERGENCY MEDICINE

## 2021-09-03 PROCEDURE — 70450 CT HEAD/BRAIN W/O DYE: CPT

## 2021-09-03 PROCEDURE — 80053 COMPREHEN METABOLIC PANEL: CPT

## 2021-09-03 RX ORDER — ASPIRIN 81 MG/1
324 TABLET, CHEWABLE ORAL ONCE
Status: COMPLETED | OUTPATIENT
Start: 2021-09-03 | End: 2021-09-03

## 2021-09-03 RX ORDER — POLYETHYLENE GLYCOL 3350 17 G/17G
17 POWDER, FOR SOLUTION ORAL DAILY PRN
Status: DISCONTINUED | OUTPATIENT
Start: 2021-09-03 | End: 2021-09-07 | Stop reason: HOSPADM

## 2021-09-03 RX ORDER — SODIUM CHLORIDE 0.9 % (FLUSH) 0.9 %
5-40 SYRINGE (ML) INJECTION EVERY 12 HOURS SCHEDULED
Status: DISCONTINUED | OUTPATIENT
Start: 2021-09-03 | End: 2021-09-07 | Stop reason: HOSPADM

## 2021-09-03 RX ORDER — ATORVASTATIN CALCIUM 40 MG/1
40 TABLET, FILM COATED ORAL DAILY
Status: DISCONTINUED | OUTPATIENT
Start: 2021-09-04 | End: 2021-09-07 | Stop reason: HOSPADM

## 2021-09-03 RX ORDER — ATORVASTATIN CALCIUM 40 MG/1
40 TABLET, FILM COATED ORAL NIGHTLY
Status: DISCONTINUED | OUTPATIENT
Start: 2021-09-03 | End: 2021-09-03

## 2021-09-03 RX ORDER — SODIUM CHLORIDE 0.9 % (FLUSH) 0.9 %
5-40 SYRINGE (ML) INJECTION PRN
Status: DISCONTINUED | OUTPATIENT
Start: 2021-09-03 | End: 2021-09-07 | Stop reason: HOSPADM

## 2021-09-03 RX ORDER — ACETAMINOPHEN 325 MG/1
650 TABLET ORAL EVERY 6 HOURS PRN
Status: DISCONTINUED | OUTPATIENT
Start: 2021-09-03 | End: 2021-09-07 | Stop reason: HOSPADM

## 2021-09-03 RX ORDER — CLONAZEPAM 0.5 MG/1
0.25 TABLET ORAL 2 TIMES DAILY PRN
Status: DISCONTINUED | OUTPATIENT
Start: 2021-09-03 | End: 2021-09-07 | Stop reason: HOSPADM

## 2021-09-03 RX ORDER — VENLAFAXINE HYDROCHLORIDE 37.5 MG/1
37.5 CAPSULE, EXTENDED RELEASE ORAL DAILY
Status: DISCONTINUED | OUTPATIENT
Start: 2021-09-04 | End: 2021-09-07 | Stop reason: HOSPADM

## 2021-09-03 RX ORDER — LORAZEPAM 2 MG/ML
1 INJECTION INTRAMUSCULAR ONCE
Status: COMPLETED | OUTPATIENT
Start: 2021-09-03 | End: 2021-09-03

## 2021-09-03 RX ORDER — ONDANSETRON 2 MG/ML
4 INJECTION INTRAMUSCULAR; INTRAVENOUS EVERY 6 HOURS PRN
Status: DISCONTINUED | OUTPATIENT
Start: 2021-09-03 | End: 2021-09-07 | Stop reason: HOSPADM

## 2021-09-03 RX ORDER — ONDANSETRON 4 MG/1
4 TABLET, ORALLY DISINTEGRATING ORAL EVERY 8 HOURS PRN
Status: DISCONTINUED | OUTPATIENT
Start: 2021-09-03 | End: 2021-09-07 | Stop reason: HOSPADM

## 2021-09-03 RX ORDER — ACETAMINOPHEN 650 MG/1
650 SUPPOSITORY RECTAL EVERY 6 HOURS PRN
Status: DISCONTINUED | OUTPATIENT
Start: 2021-09-03 | End: 2021-09-07 | Stop reason: HOSPADM

## 2021-09-03 RX ORDER — LOSARTAN POTASSIUM 100 MG/1
100 TABLET ORAL DAILY
Status: DISCONTINUED | OUTPATIENT
Start: 2021-09-04 | End: 2021-09-04

## 2021-09-03 RX ORDER — SODIUM CHLORIDE 9 MG/ML
25 INJECTION, SOLUTION INTRAVENOUS PRN
Status: DISCONTINUED | OUTPATIENT
Start: 2021-09-03 | End: 2021-09-07 | Stop reason: HOSPADM

## 2021-09-03 RX ORDER — MECLIZINE HCL 12.5 MG/1
25 TABLET ORAL ONCE
Status: COMPLETED | OUTPATIENT
Start: 2021-09-03 | End: 2021-09-03

## 2021-09-03 RX ADMIN — IOPAMIDOL 75 ML: 755 INJECTION, SOLUTION INTRAVENOUS at 15:10

## 2021-09-03 RX ADMIN — Medication 10 ML: at 21:23

## 2021-09-03 RX ADMIN — ASPIRIN 324 MG: 81 TABLET, CHEWABLE ORAL at 17:53

## 2021-09-03 RX ADMIN — LORAZEPAM 1 MG: 2 INJECTION INTRAMUSCULAR; INTRAVENOUS at 15:32

## 2021-09-03 RX ADMIN — MECLIZINE 25 MG: 12.5 TABLET ORAL at 15:32

## 2021-09-03 ASSESSMENT — PAIN SCALES - GENERAL: PAINLEVEL_OUTOF10: 0

## 2021-09-03 NOTE — H&P
HOSPITALISTS HISTORY AND PHYSICAL    9/3/2021 6:40 PM    Patient Information:  Laura Gutierrez is a 77 y.o. female [id number]  PCP:  Samantha Landrum MD (Tel: [phone] )    Chief complaint:    Chief Complaint   Patient presents with    Dizziness     pt brought in by [facility] squad with c/o dizziness since a little after 11. pt states she's had 3 episodes in the past 6 months but this has been the most intense. History of Present Illness:  Jina Turpin is a 77 y.o. female with h/o HTN , DM , anxiety presents with c/o dizziness and near syncopal episode. She went for walk in the mall when she felt really warm and felt dizzy. She had to sit down holding her head in her hand for several minutes. Store staff gave her some water and pop escobar and called EMS. The pt takes her BP meds in the morning. Reports occasional sBP readings as low as 101. Has had couple of similar episode in the past 6  months. Denies fall, LOC. Denies fever, chills, leg edema, chest pain, palpitations, abdominal pain /n/v/dc. No focal neurological deficit     ED work up showed EKG NSR  Troponin < 0.01. CT head is neg for hemorrhage or ischemic changes. CTA head and neck showed severe stonosis at the origin of subclavian artery. REVIEW OF SYSTEMS:   Constitutional: Negative for fever,chills or night sweats  ENT: Negative for rhinorrhea, epistaxis, hoarseness, sore throat. Respiratory: Negative for shortness of breath,wheezing  Cardiovascular: Negative for chest pain, palpitations   Gastrointestinal: Negative for nausea, vomiting, diarrhea  Genitourinary: Negative for polyuria, dysuria   Hematologic/Lymphatic: Negative for bleeding tendency, easy bruising  Musculoskeletal: Negative for myalgias and arthralgias  Neurologic: Negative for confusion,dysarthria. Skin: Negative for itching,rash  Psychiatric: Negative for depression,anxiety, agitation.   Endocrine: Negative for polydipsia,polyuria,heat /cold intolerance. Past Medical History:   has a past medical history of History of lung cancer, Hyperlipidemia, and Hypertension. Past Surgical History:   has a past surgical history that includes Lung surgery (2011) and back surgery (2014). Medications:  No current facility-administered medications on file prior to encounter. Current Outpatient Medications on File Prior to Encounter   Medication Sig Dispense Refill    predniSONE (DELTASONE) 10 MG tablet 1 TID for 3 day then 1 BID 15 tablet 0    venlafaxine (EFFEXOR XR) 37.5 MG extended release capsule Take 1 capsule by mouth daily 90 capsule 3    atorvastatin (LIPITOR) 20 MG tablet TAKE 1 TABLET BY MOUTH DAILY AT BEDTIME 90 tablet 3    losartan (COZAAR) 100 MG tablet TAKE 1 TABLET BY MOUTH EVERY DAY 90 tablet 3    meloxicam (MOBIC) 15 MG tablet Take 1 tablet by mouth daily as needed for Pain 90 tablet 3    Multiple Vitamins-Minerals (CENTRUM SILVER 50+WOMEN PO) Take by mouth      clonazePAM (KLONOPIN) 0.5 MG tablet Take 0.5 tablets by mouth 2 times daily as needed for Anxiety for up to 7 days.  6 tablet 0     Current Facility-Administered Medications   Medication Dose Route Frequency Provider Last Rate Last Admin    losartan (COZAAR) tablet 100 mg  100 mg Oral Nightly Terri Ness MD        venlafaxine (EFFEXOR XR) extended release capsule 37.5 mg  37.5 mg Oral Daily Terri Ness MD        clonazePAM Veronica Tawana) tablet 0.25 mg  0.25 mg Oral BID PRN Terri Ness MD        sodium chloride flush 0.9 % injection 5-40 mL  5-40 mL IntraVENous 2 times per day Terri Ness MD   10 mL at 09/03/21 2123    sodium chloride flush 0.9 % injection 5-40 mL  5-40 mL IntraVENous PRN Terri Ness MD        0.9 % sodium chloride infusion  25 mL IntraVENous PRN Terri Ness MD        enoxaparin (LOVENOX) injection 40 mg  40 mg SubCUTAneous Daily Terri Ness MD        ondansetron (ZOFRAN-ODT) disintegrating tablet 4 mg  4 mg Oral Q8H PRN Gonzalo Pickett MD        Or    ondansetron [facility]) injection 4 mg  4 mg IntraVENous Q6H PRN Gonzalo Pickett MD        polyethylene glycol (GLYCOLAX) packet 17 g  17 g Oral Daily PRN Gonzalo Pickett MD        acetaminophen (TYLENOL) tablet 650 mg  650 mg Oral Q6H PRN Gonzalo Pickett MD        Or   [employer] acetaminophen (TYLENOL) suppository 650 mg  650 mg Rectal Q6H PRN Gonzalo Pickett MD        atorvastatin (LIPITOR) tablet 40 mg  40 mg Oral Daily Gonzalo Pickett MD         Allergies: Allergies   Allergen Reactions    Codeine Other (See Comments)     Wasn't able to speak or move        Social History:   reports that she has been smoking cigarettes. She started smoking about 50 years ago. She has a 25.00 pack-year smoking history. She has never used smokeless tobacco. She reports previous alcohol use. She reports that she does not use drugs. Family History:  family history includes COPD in her sister; Cancer in her mother; Diabetes in her maternal grandfather; High Cholesterol in her mother and sister; No Known Problems in her brother, brother, and father. ,     Physical Exam:  BP (!) 148/95   Pulse 70   Temp 97.4 °F (36.3 °C) (Infrared)   Resp 17   Ht 5' 5\" (1.651 m)   Wt 220 lb (99.8 kg)   SpO2 96%   BMI 36.61 kg/m²     General appearance:  Appears comfortable. Well nourished  Eyes: Sclera clear, pupils equal  ENT: Moist mucus membranes, no thrush. Trachea midline. Cardiovascular: Regular rhythm, normal S1, S2. No murmur, gallop, rub. No edema in lower extremities  Respiratory: Clear to auscultation bilaterally, no wheeze, good inspiratory effort  Gastrointestinal: Abdomen soft, non-tender, not distended, normal bowel sounds  Musculoskeletal: No cyanosis in digits, neck supple  Neurology: Cranial nerves grossly intact. Alert and oriented in time, place and person. No speech or motor deficits  Psychiatry: Appropriate affect.  Not agitated  Skin: Warm, dry, normal turgor, no rash    Labs:  CBC:   Lab Results   Component Value Date    WBC 8.5 09/03/2021    RBC 4.62 09/03/2021    HGB 14.3 09/03/2021    HCT 43.2 09/03/2021    MCV 93.5 09/03/2021    MCH 31.0 09/03/2021    MCHC 33.2 09/03/2021    RDW 14.5 09/03/2021     09/03/2021    MPV 8.1 09/03/2021     BMP:    Lab Results   Component Value Date     09/03/2021    K 3.8 09/03/2021     09/03/2021    CO2 28 09/03/2021    BUN 16 09/03/2021    CREATININE 0.6 09/03/2021    CALCIUM 9.7 09/03/2021    GFRAA >60 09/03/2021    LABGLOM >60 09/03/2021    GLUCOSE 108 09/03/2021       Chest Xray:   EKG:        Problem List  Active Problems:    Syncope and collapse  Resolved Problems:    * No resolved hospital problems. *        Assessment/Plan:         1. Near Syncopal episode  Check orthostatic vitals  Pt has low BP in the morning  Change losartan to evening/ afternoon dose instead of in the morning  ECHO ordered  Cont to trend troponin         Admit as obs. I anticipate hospitalization spanning less than two midnights for investigation and treatment of the above medically necessary diagnoses.       Rosangela Tillman MD    9/3/2021 6:40 PM

## 2021-09-03 NOTE — ED PROVIDER NOTES
I received this patient in signout from Dr. Kerri Amaya. We discussed the patient's initial history, physical, workup thus far, and medical decision making to this point. Briefly, Frank Andersen is a 77 y.o. female  who presents to the ED complaining of dizziness. Initial history/exam also pertinent for improving symptoms, history of similar symptoms without prior eval. Stroke team contacted, not a tPA candidate. Pending studies at time of signout include:  CT imaging    The patient will be reassessed after workup above is completed. ED COURSE/MDM  I introduced myself to the patient and performed my initial assessment on Frank Andersen.  There is no significant change in the patient's history from what is documented initially by Dr. Kerri Amaya  after my evaluation. Old records reviewed. Labs and imaging reviewed and results discussed with patient. Since time of sign out, the patient's ED workup was notable for CT head nonacute. CTA with left subclavian artery stenosis, otherwise with no acute process. During the patient's ED course, the patient was given:  Medications   LORazepam (ATIVAN) injection 1 mg (1 mg IntraVENous Given 9/3/21 1532)   meclizine (ANTIVERT) tablet 25 mg (25 mg Oral Given 9/3/21 1532)   iopamidol (ISOVUE-370) 76 % injection 75 mL (75 mLs IntraVENous Given 9/3/21 1510)   aspirin chewable tablet 324 mg (324 mg Oral Given 9/3/21 1753)        CLINICAL IMPRESSION  1. Near syncope    2. Dizziness    3. Abnormal EKG    4. Stenosis of left subclavian artery (HCC)        Blood pressure (!) 148/95, pulse 70, temperature 97.4 °F (36.3 °C), temperature source Infrared, resp. rate 17, height 5' 5\" (1.651 m), weight 220 lb (99.8 kg), SpO2 96 %. Ananya was admitted in stable condition. Patient afebrile and nontoxic. No distress. EKG reviewed, nonspecific T wave morphology without prior comparison is noted.   Troponin normal, ACS or malignant dysrhythmia

## 2021-09-03 NOTE — ED PROVIDER NOTES
[facility]  EMERGENCY DEPARTMENTENCOUNTER      Pt Name: Frank Andersen  MRN: [id number]  [place] 1955  Date ofevaluation: 9/3/2021  Provider: Laura Plummer MD    [address] [place] [address]       Chief Complaint   Patient presents with    Dizziness     pt brought in by [facility] squad with c/o dizziness since a little after 11. pt states she's had 3 episodes in the past 6 months but this has been the most intense. HPI    HISTORY OF PRESENT ILLNESS   (Location/Symptom, Timing/Onset,Context/Setting, Quality, Duration, Modifying Factors, Severity)  Note limiting factors. Frank Andersen is a 77 y.o. female who presents to the emergency department with dizziness. This is a 69-year-old female presents with a vertiginous type dizziness that started around 1130 this morning. She describes it as things turning around on her. The patient states this is not new, she has had this at least 3 times in the last 6 months but is more intense today. She denies recent head injuries. She denies any recent illnesses. NursingNotes were reviewed. Review of Systems    REVIEW OF SYSTEMS    (2-9 systems for level 4, 10 or more for level 5)     Review of Systems   Constitutional: Negative for fever. HENT: Negative for rhinorrhea and sore throat. Eyes: Negative for redness. Respiratory: Negative for shortness of breath. Cardiovascular: Negative for chest pain. Gastrointestinal: Negative for abdominal pain. Genitourinary: Negative for flank pain. Neurological: Negative for headaches. Positive for the above. Hematological: Negative for adenopathy. Psychiatric/Behavioral: Negative for confusion. Except as noted above the remainder of the review of systems was reviewed and negative.        PAST MEDICAL HISTORY     Past Medical History:   Diagnosis Date    History of lung cancer     Hyperlipidemia     Hypertension          SURGICALHISTORY       Past Surgical History:   Procedure Laterality Date    BACK SURGERY  2014    spinal stenosis    LUNG SURGERY  2011    left VATS for stage IA adenocarcinoma         CURRENT MEDICATIONS       Previous Medications    ATORVASTATIN (LIPITOR) 20 MG TABLET    TAKE 1 TABLET BY MOUTH DAILY AT BEDTIME    CLONAZEPAM (KLONOPIN) 0.5 MG TABLET    Take 0.5 tablets by mouth 2 times daily as needed for Anxiety for up to 7 days.     LOSARTAN (COZAAR) 100 MG TABLET    TAKE 1 TABLET BY MOUTH EVERY DAY    MELOXICAM (MOBIC) 15 MG TABLET    Take 1 tablet by mouth daily as needed for Pain    MULTIPLE VITAMINS-MINERALS (CENTRUM SILVER 50+WOMEN PO)    Take by mouth    PREDNISONE (DELTASONE) 10 MG TABLET    1 TID for 3 day then 1 BID    VENLAFAXINE (EFFEXOR XR) 37.5 MG EXTENDED RELEASE CAPSULE    Take 1 capsule by mouth daily       ALLERGIES     Codeine    FAMILY HISTORY       Family History   Problem Relation Age of Onset    Cancer Mother         thyroid    High Cholesterol Mother     COPD Sister     No Known Problems Father     Diabetes Maternal Grandfather     No Known Problems Brother     No Known Problems Brother     High Cholesterol Sister           SOCIAL HISTORY       Social History     Socioeconomic History    Marital status:      Spouse name: None    Number of children: None    Years of education: None    Highest education level: None   Occupational History    None   Tobacco Use    Smoking status: Current Every Day Smoker     Packs/day: 0.50     Years: 50.00     Pack years: 25.00     Types: Cigarettes     Start date: 12/7/1970    Smokeless tobacco: Never Used    Tobacco comment: 2 packs a week   Substance and Sexual Activity    Alcohol use: Not Currently     Comment: stopped a year ago, felt like was drinking too much    Drug use: No    Sexual activity: Yes     Partners: Male     Birth control/protection: Post-menopausal   Other Topics Concern    None   Social History Narrative    None     Social Determinants of Health     Financial Resource Strain:     Difficulty of Paying Living Expenses:    Food Insecurity:     Worried About [address] in the Last Year:     [address] in the Last Year:    Transportation Needs:     Lack of Transportation (Medical):  Lack of Transportation (Non-Medical):    Physical Activity:     Days of Exercise per Week:     Minutes of Exercise per Session:    Stress:     Feeling of Stress :    Social Connections:     Frequency of Communication with Friends and Family:     Frequency of Social Gatherings with Friends and Family:     Attends Taoism Services:     Active Member of Clubs or Organizations:     Attends Club or Organization Meetings:     Marital Status:    Intimate Partner Violence:     Fear of Current or Ex-Partner:     Emotionally Abused:     Physically Abused:     Sexually Abused:        SCREENINGS   NIH Stroke Scale  Interval: Baseline  Level of Consciousness (1a. ): Alert  LOC Questions (1b. ): Answers both correctly  LOC Commands (1c. ): Performs both tasks correctly  Best Gaze (2. ): Normal  Visual (3. ): No visual loss  Facial Palsy (4. ): Normal symmetrical movement  Motor Arm, Left (5a. ): No drift  Motor Arm, Right (5b. ): No drift  Motor Leg, Left (6a. ): No drift  Motor Leg, Right (6b. ): No drift  Limb Ataxia (7. ): Absent  Sensory (8. ): Normal  Best Language (9. ): No aphasia  Dysarthria (10. ): Normal  Extinction and Inattention (11): No abnormality  Total: 0         PHYSICAL EXAM    (up to 7 for level 4, 8 or more for level 5)     ED Triage Vitals [09/03/21 1245]   BP Temp Temp Source Pulse Resp SpO2 Height Weight   (!) 144/110 97.4 °F (36.3 °C) Infrared 68 14 96 % 5' 5\" (1.651 m) 220 lb (99.8 kg)       Physical Exam:      General Appearance:  Alert, cooperative, appears stated age. Head:  Normocephalic, without obvious abnormality, atraumatic. Eyes:  conjunctiva/corneas clear, EOM's intact. Sclera anicteric.   Mild horizontal nystagmus bilaterally. ENT:  Mucous remains are moist and pink   Neck: Supple, symmetrical, trachea midline, no adenopathy. No jugular venous distention. Lungs:    Clear to auscultation bilaterally   Chest Wall:   No pain to palpation   Heart:   Genitourinary:  Regular rate rhythm with no murmurs rubs gallops  Deferred   Abdomen:    Soft and benign. No guarding rebound. Extremities:  No clubbing cyanosis or edema   Pulses:  Good throughout   Skin:  No rashes or lesions to exposed skin. Neurologic: Alert and oriented X 3. No focal motor or sensory deficits throughout. NIH score 0. DIAGNOSTIC RESULTS     EKG: All EKG's are interpreted by the Emergency Department Physician who either signs or Co-signsthis chart in the absence of a cardiologist.    Sinus rhythm at a rate of 68 beats a minute with no acute ST elevations or depressions or pathologic Q waves. T wave inversions in the anterior precordial leads. RADIOLOGY:   Non-plain filmimages such as CT, Ultrasound and MRI are read by the radiologist. Plain radiographic images are visualized and preliminarily interpreted by the emergency physician with the below findings:    See below    Interpretation per the Radiologist below, if available at the time ofthis note: All incidental findings were discussed with the patient.     CT HEAD WO CONTRAST    (Results Pending)   CTA HEAD NECK W CONTRAST    (Results Pending)         ED BEDSIDE ULTRASOUND:   Performed by ED Physician - none    LABS:  Labs Reviewed   CBC WITH AUTO DIFFERENTIAL    Narrative:     Performed at:  [facility] [address], [place], [address] [facility]   Phone [phone]   SPECIMEN REJECTION    Narrative:     Dawood Geiger. [phone],  Rejected Test Name/Called to:marielle sebastian/negra rn, 09/03/2021 13:52, by Alex Ramos  Performed at:  [facility] [address] [facility] [address]   Phone [phone] COMPREHENSIVE METABOLIC PANEL W/ REFLEX TO MG FOR LOW K   TROPONIN       All other labs were within normal range or not returned as of this dictation. EMERGENCY DEPARTMENT COURSE and DIFFERENTIAL DIAGNOSIS/MDM:   Vitals:    Vitals:    09/03/21 1245   BP: (!) 144/110   Pulse: 68   Resp: 14   Temp: 97.4 °F (36.3 °C)   TempSrc: Infrared   SpO2: 96%   Weight: 220 lb (99.8 kg)   Height: 5' 5\" (1.651 m)           MDM    The patient has remained stable throughout hospital course. On reexamination, the patient feels much better. I did contact the stroke team and discussed the case with him. Given the patient's recurrent symptoms they do not feel she was a candidate for immediate TPA and considering the patient's significant improvement. A CT a of the head and neck and CT of the head are currently pending. Laboratory results are also pending on the patient. Patient be turned over the next physician. Please see his dictation for further details and final disposition. The patient is currently in stable condition. REASSESSMENT              CONSULTS:  None    PROCEDURES:  Unless otherwise noted below, none     Procedures    FINAL IMPRESSION      1. Dizziness          DISPOSITION/PLAN   DISPOSITION        PATIENT REFERREDTO:  No follow-up provider specified. DISCHARGEMEDICATIONS:  New Prescriptions    No medications on file     Controlled Substances Monitoring:     No flowsheet data found.     (Please note that portions of this note were completed with a voice recognition program.  Efforts were made to edit the dictations but occasionally words are mis-transcribed.)    Daniel Ponce MD (electronically signed)  Attending Emergency Physician         Daniel Ponce MD  09/03/21 1301

## 2021-09-04 LAB
ANION GAP SERPL CALCULATED.3IONS-SCNC: 8 MMOL/L (ref 3–16)
BUN BLDV-MCNC: 15 MG/DL (ref 7–20)
CALCIUM SERPL-MCNC: 9.4 MG/DL (ref 8.3–10.6)
CHLORIDE BLD-SCNC: 103 MMOL/L (ref 99–110)
CO2: 27 MMOL/L (ref 21–32)
CREAT SERPL-MCNC: 0.6 MG/DL (ref 0.6–1.2)
GFR AFRICAN AMERICAN: >60
GFR NON-AFRICAN AMERICAN: >60
GLUCOSE BLD-MCNC: 105 MG/DL (ref 70–99)
POTASSIUM SERPL-SCNC: 3.7 MMOL/L (ref 3.5–5.1)
SODIUM BLD-SCNC: 138 MMOL/L (ref 136–145)
TROPONIN: <0.01 NG/ML

## 2021-09-04 PROCEDURE — 6360000002 HC RX W HCPCS: Performed by: FAMILY MEDICINE

## 2021-09-04 PROCEDURE — 6370000000 HC RX 637 (ALT 250 FOR IP): Performed by: FAMILY MEDICINE

## 2021-09-04 PROCEDURE — G0378 HOSPITAL OBSERVATION PER HR: HCPCS

## 2021-09-04 PROCEDURE — 97530 THERAPEUTIC ACTIVITIES: CPT

## 2021-09-04 PROCEDURE — 80048 BASIC METABOLIC PNL TOTAL CA: CPT

## 2021-09-04 PROCEDURE — 84484 ASSAY OF TROPONIN QUANT: CPT

## 2021-09-04 PROCEDURE — 97165 OT EVAL LOW COMPLEX 30 MIN: CPT

## 2021-09-04 PROCEDURE — 2580000003 HC RX 258: Performed by: FAMILY MEDICINE

## 2021-09-04 PROCEDURE — 99221 1ST HOSP IP/OBS SF/LOW 40: CPT | Performed by: SURGERY

## 2021-09-04 PROCEDURE — 96372 THER/PROPH/DIAG INJ SC/IM: CPT

## 2021-09-04 PROCEDURE — 36415 COLL VENOUS BLD VENIPUNCTURE: CPT

## 2021-09-04 RX ORDER — LOSARTAN POTASSIUM 25 MG/1
50 TABLET ORAL NIGHTLY
Status: DISCONTINUED | OUTPATIENT
Start: 2021-09-04 | End: 2021-09-04

## 2021-09-04 RX ORDER — LOSARTAN POTASSIUM 25 MG/1
50 TABLET ORAL DAILY
Status: DISCONTINUED | OUTPATIENT
Start: 2021-09-05 | End: 2021-09-07 | Stop reason: HOSPADM

## 2021-09-04 RX ORDER — LOSARTAN POTASSIUM 100 MG/1
100 TABLET ORAL NIGHTLY
Status: DISCONTINUED | OUTPATIENT
Start: 2021-09-04 | End: 2021-09-04

## 2021-09-04 RX ADMIN — ENOXAPARIN SODIUM 40 MG: 40 INJECTION SUBCUTANEOUS at 09:06

## 2021-09-04 RX ADMIN — Medication 10 ML: at 08:58

## 2021-09-04 RX ADMIN — ATORVASTATIN CALCIUM 40 MG: 40 TABLET, FILM COATED ORAL at 21:08

## 2021-09-04 RX ADMIN — Medication 10 ML: at 21:08

## 2021-09-04 RX ADMIN — VENLAFAXINE HYDROCHLORIDE 37.5 MG: 37.5 CAPSULE, EXTENDED RELEASE ORAL at 09:07

## 2021-09-04 ASSESSMENT — PAIN SCALES - GENERAL
PAINLEVEL_OUTOF10: 0

## 2021-09-04 NOTE — CONSULTS
[facility]  Consultation Note    Chief Complaint / Reason for Consultation  Left subclavian stenosis    History of Present Illness  Patient is a 77 y.o. female with history of adenocarcinoma of the lung (s/p ALLAN lobectomy 2011), hypertension, hyperlipidemia who presented to the emergency department with complaints of dizziness. States she has had several episodes of this over the last 6 months or so. Work-up here revealed a left subclavian stenosis and as a result vascular surgery has been consulted. It was also noted that patient had a significant left subclavian stenosis on CT scan of the chest in 2019 however was not commented in the radiology report. She does not relate any significant history of left arm claudication. Patient does continue to smoke 1/2 pack/day    Review of Systems  Denies fevers, chills, chest pain, shortness of breath, nausea, vomiting, hematemesis, diarrhea, constipation, melena, hematochezia, wt changes, vision problems, blindness, hearing problems, facial droop, slurred speech, extremity weakness, extremity numbness, dysuria.     Past Medical History:   Diagnosis Date    History of lung cancer     Hyperlipidemia     Hypertension        Past Surgical History:   Procedure Laterality Date    BACK SURGERY  2014    spinal stenosis    LUNG SURGERY  2011    left VATS for stage IA adenocarcinoma       Allergies   Allergen Reactions    Codeine Other (See Comments)     Wasn't able to speak or move       Social History     Socioeconomic History    Marital status:      Spouse name: Not on file    Number of children: Not on file    Years of education: Not on file    Highest education level: Not on file   Occupational History    Not on file   Tobacco Use    Smoking status: Current Every Day Smoker     Packs/day: 0.50     Years: 50.00     Pack years: 25.00     Types: Cigarettes     Start date: 12/7/1970    Smokeless tobacco: Never Used    Tobacco comment: 2 packs a week   Substance and Sexual Activity    Alcohol use: Not Currently     Comment: stopped a year ago, felt like was drinking too much    Drug use: No    Sexual activity: Yes     Partners: Male     Birth control/protection: Post-menopausal   Other Topics Concern    Not on file   Social History Narrative    Not on file     Social Determinants of Health     Financial Resource Strain:     Difficulty of Paying Living Expenses:    Food Insecurity:     Worried About Running Out of Food in the Last Year:     [address] in the Last Year:    Transportation Needs:     Lack of Transportation (Medical):      Lack of Transportation (Non-Medical):    Physical Activity:     Days of Exercise per Week:     Minutes of Exercise per Session:    Stress:     Feeling of Stress :    Social Connections:     Frequency of Communication with Friends and Family:     Frequency of Social Gatherings with Friends and Family:     Attends Mosque Services:     Active Member of Clubs or Organizations:     Attends Club or Organization Meetings:     Marital Status:    Intimate Partner Violence:     Fear of Current or Ex-Partner:     Emotionally Abused:     Physically Abused:     Sexually Abused:        Family History   Problem Relation Age of Onset    Cancer Mother         thyroid    High Cholesterol Mother     COPD Sister     No Known Problems Father     Diabetes Maternal Grandfather     No Known Problems Brother     No Known Problems Brother     High Cholesterol Sister      - No history of bleeding or clotting disorders    Vital Signs  Vitals:    09/04/21 0438 09/04/21 0738 09/04/21 0855 09/04/21 1230   BP: 116/77  96/68 (!) 91/55   Pulse: 90  84 77   Resp: 16  16 16   Temp: 97.8 °F (36.6 °C)  98.5 °F (36.9 °C) 98.7 °F (37.1 °C)   TempSrc: Oral  Oral Oral   SpO2: 95%  95% 96%   Weight:  236 lb 6.4 oz (107.2 kg)     Height:           Physical Examination  General:  no apparent distress  Psychiatric: affect appropriate  Head/Eyes/Ears/Nose/Throat:  Atraumatic, vision and hearing intact, face symmetric  Neck:  supple  Chest/Lungs: clear to auscultation bilaterally  Cardiac:  Regular rate and rhythm  Abdomen: soft, nontender  Extremities: warm and well perfused  1+ left brachial pulse 2+ right brachial pulse. - bilateral upper extremity motorsensory intact  - bilateral lower extremity motorsensory intact  Vascular exam:  - R femoral: 2  - L femoral: 2  - R DP: 2  - L DP: 2  - R PT: 2  - L PT: 2      Labs  Lab Results   Component Value Date    WBC 8.5 09/03/2021    HGB 14.3 09/03/2021    HCT 43.2 09/03/2021    MCV 93.5 09/03/2021     09/03/2021     Lab Results   Component Value Date     09/04/2021    K 3.7 09/04/2021    K 3.8 09/03/2021     09/04/2021    CO2 27 09/04/2021    BUN 15 09/04/2021    CREATININE 0.6 09/04/2021      No components found for: GLU    Imaging:     Severe focal stenosis at the origin of the left subclavian artery.       Otherwise, no acute abnormality or flow-limiting stenosis in the remainder of   the major arteries of the head and neck. Dominant right vertebral artery noted. CT 2019:  Revealed 75% left subclavian stenosis    Assessment:   Asymptomatic left subclavian stenosis  Tobacco abuse      Plan: It is unlikely her left subclavian stenosis is contributing to her episodes of dizziness. There has been evidence of this left subclavian stenosis for several years. It is likely related to her underlying tobacco abuse. She has no significant left arm claudication and the symptoms were reviewed with her in detail today. I will plan to see her again in my office in 4 weeks to continue to monitor. If she develops any significant left arm claudication or vertebrobasilar symptoms would then consider possible left subclavian stent. This was discussed with her in detail.   We will sign off but please contact me with any questions  Sincerely appreciate the consultation      Gisela Vaughan.  Jaye Barnes M.D., FACS.  9/4/2021  3:08 PM

## 2021-09-04 NOTE — PROGRESS NOTES
Occupational Therapy   Occupational Therapy Initial Assessment  Date: 2021   Patient Name: Mario Mclaughlin  MRN: [id number]     : 1955    Date of Service: 2021    Discharge Recommendations: Mario Mclaughlin scored a 19/21 on the -[facility] ADL Inpatient form. At this time, no further OT is recommended upon discharge due to pt is independent with ADLs and functional mobility. Recommend patient returns to prior setting with prior services. Assessment   Assessment: Pt is a 77 y.o. F presenting after feeling dizzy at home. Pt is at functional baseline, denies symptoms at time of eval.  Treatment Diagnosis: Syncope and collapse  Prognosis: Good  Decision Making: Low Complexity  OT Education: OT Role;Plan of Care;Home Exercise Program  Patient Education: v/u  No Skilled OT: Independent with ADL's;At baseline function; Safe to return home; No OT goals identified  REQUIRES OT FOLLOW UP: No  Activity Tolerance  Activity Tolerance: Patient Tolerated treatment well  Safety Devices  Safety Devices in place: Yes  Type of devices: Left in bed;Call light within reach;Gait belt           Patient Diagnosis(es): The primary encounter diagnosis was Near syncope. Diagnoses of Dizziness, Abnormal EKG, and Stenosis of left subclavian artery (HCC) were also pertinent to this visit. has a past medical history of History of lung cancer, Hyperlipidemia, and Hypertension. has a past surgical history that includes Lung surgery () and back surgery (). Treatment Diagnosis: Syncope and collapse      Restrictions  Restrictions/Precautions  Restrictions/Precautions: General Precautions  Position Activity Restriction  Other position/activity restrictions: Mario Mclaughlin is a 77 y.o. female with h/o HTN , DM , anxiety presents with c/o dizziness and near syncopal episode. She went for walk in the mall when she felt really warm and felt dizzy.  She had to sit down holding her head in her hand for several minutes. Store staff gave her some water and pop escobar and called EMS. The pt takes her BP meds in the morning. Reports occasional sBP readings as low as 101. Has had couple of similar episode in the past 6  months. Denies fall, LOC. Denies fever, chills, leg edema, chest pain, palpitations, abdominal pain /n/v/dc. No focal neurological deficit    Subjective   General  Chart Reviewed: Yes  Patient assessed for rehabilitation services?: Yes  Family / Caregiver Present: No  Diagnosis: Syncope and collapse  Subjective  Subjective: Pt met bedside, very pleseant and agreeable to OT eval.  Patient Currently in Pain: Denies  Pain Assessment  Pain Assessment: 0-10  Pain Level: 0  Vital Signs  Temp: 98.7 °F (37.1 °C)  Temp Source: Oral  Pulse: 77  Heart Rate Source: Telemetry  Resp: 16  BP: (!) 91/55  BP Location: Left upper arm  MAP (mmHg): 67  Patient Position: High fowlers  Level of Consciousness: Alert (0)  MEWS Score: 2  Patient Currently in Pain: Denies  Oxygen Therapy  SpO2: 96 %  Pulse Oximeter Device Mode: Intermittent  Pulse Oximeter Device Location: Finger  O2 Device: None (Room air)  Social/Functional History  Social/Functional History  Lives With: Spouse  Type of Home: House  Home Layout: One level, Able to Live on Main level with bedroom/bathroom  Home Access: Stairs to enter with rails  Entrance Stairs - Number of Steps: 3  Entrance Stairs - Rails: Left  Bathroom Shower/Tub: Walk-in shower  Bathroom Toilet: Standard  Home Equipment: Cane, Standard walker  ADL Assistance: Independent  Homemaking Responsibilities: Yes  Meal Prep Responsibility: Primary  Laundry Responsibility: Primary  Cleaning Responsibility: Primary  Ambulation Assistance: Independent  Transfer Assistance: Independent  Active : Yes  Occupation: Retired, Caregiver  Type of occupation: retired - 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   Leisure & Hobbies: entertain, travel, fish, gardern, babysit  Additional Inpatient CMS 0-100% Score: 0 (09/04/21 1352)  ADL Inpatient CMS G-Code Modifier : [address] (09/04/21 1352)    Goals  Short term goals  Short term goal 1: no acute OT goals identified       Therapy Time   Individual Concurrent Group Co-treatment   Time In 1320         Time Out 1346         Minutes 26              Timed Code Treatment Minutes:   11 min     Total Treatment Minutes:  26 min       Brittany Marks OT

## 2021-09-04 NOTE — PROGRESS NOTES
Admission complete, meds given www. Pt admitted from home. Had severe dizziness earlier and caused her to fall to the side into a chair. Has had mild dizziness for the past 6 mon, today has been the worse. No other deficits. No stroke orders, no stroke alert called. Was told pt needs an MRI, but I don't see any orders. Does have orders for echo. CT neck: Severe focal stenosis in the Left subclavian. Orthostatics were neg. Pt is A&O x4, alert to room and call light. VSS. No other concerns.

## 2021-09-04 NOTE — PROGRESS NOTES
Hospitalist notified via perfect serve as follows; FYI: Spoke with ECHO. Pt will not be done today, possibly not tomorrow and ECHO closed Monday. Spouse is asking about plan or if pt is going to remain inpatient, waiting for tests. Vascular sx and Cards has been notified but no one has rounded as of yet.

## 2021-09-04 NOTE — PLAN OF CARE
Problem: Falls - Risk of:  Goal: Will remain free from falls  Description: Will remain free from falls  Outcome: Ongoing  Goal: Absence of physical injury  Description: Absence of physical injury  Outcome: Ongoing     Problem: SAFETY  Goal: Free from accidental physical injury  Outcome: Ongoing  Goal: Free from intentional harm  Outcome: Ongoing     Problem: DAILY CARE  Goal: Daily care needs are met  Outcome: Ongoing     Problem: PAIN  Goal: Patient's pain/discomfort is manageable  Outcome: Ongoing     Problem: SKIN INTEGRITY  Goal: Skin integrity is maintained or improved  Outcome: Ongoing     Problem: KNOWLEDGE DEFICIT  Goal: Patient/S.O. demonstrates understanding of disease process, treatment plan, medications, and discharge instructions. Outcome: Ongoing     Problem: DISCHARGE BARRIERS  Goal: Patient's continuum of care needs are met  Outcome: Ongoing    VSS, pt doing well, no dizziness. Up indep in room, instructed to ensure balance and footing when changing positions as to dec r/o fall from sudden onset dizziness, pt instructed to use call light for and needs/dizziness. Call light remains within reach. Testing to continue to evaluate tomorrow.

## 2021-09-04 NOTE — PROGRESS NOTES
Physical Therapy  Patient independent per OT evaluation. Will sign off as patient has no therapy needs.   Helder Campos, DPT, ATC-R [id number]

## 2021-09-04 NOTE — PROGRESS NOTES
HOSPITALISTS PROGRESS NOTE    9/4/2021 7:34 AM        Name: Patrick Pat .              Admitted: 9/3/2021  Primary Care Provider: Maddi Ames MD (Tel: [phone])      CC: Dizziness    Brief Course: This 77 y.o. female with h/o HTN , DM , anxiety presented with c/o dizziness and near syncopal episode. She went for walk in the mall when she felt really warm and felt dizzy. She had to sit down holding her head in her hand for several minutes. Store staff gave her some water and pop escobar and called EMS. The pt takes her BP meds in the morning. Reports occasional sBP readings as low as 101. Has had couple of similar episode in the past 6  months. Denies fall, LOC. Denies fever, chills, leg edema, chest pain, palpitations, abdominal pain /n/v/dc. No focal neurological deficit    ED work up showed EKG NSR  Troponin < 0.01. CT head is neg for hemorrhage or ischemic changes. CTA head and neck showed severe stonosis at the origin of subclavian artery. Interval history:   Pt seen and examined today   Overnight events noted and interval ancillary notes  Pt and her  by the bedside very concerned about dizziness and syncopal episode stated that she had 3 near syncope episode in the last 6 months   denied any fevers, chills, chest pain, palpitations, cough, SOB, dizziness, blurry vision, bowel or bladder dysfunction, any focal sensory or motor deficits.        Assessment & Plan:     Near Syncopal episode  Patient reported 3 near syncopal episode in the past 6 months  Orthostatic vitals negative  Losartan dose reduced and timing changed to overnight  ECHO ordered, cardiology consulted  Monitor on telemetry     Abnormal EKG: Cardiology consulted  trend troponin     Hypertension: On losartan at home  Dose reduced to half  Monitor blood pressure closely      Asymptomatic left subclavian stenosis:   CTA head and neck showed severe stonosis at the origin of subclavian artery. Vascular surgery consulted; follow-up in 4 weeks outpatient recommended for possible left subclavian stent        DVT PPX:   Code:Full Code  Diet: ADULT DIET; Regular    Disposition:     Current Medications  losartan (COZAAR) tablet 100 mg, Nightly  venlafaxine (EFFEXOR XR) extended release capsule 37.5 mg, Daily  clonazePAM (KLONOPIN) tablet 0.25 mg, BID PRN  sodium chloride flush 0.9 % injection 5-40 mL, 2 times per day  sodium chloride flush 0.9 % injection 5-40 mL, PRN  0.9 % sodium chloride infusion, PRN  enoxaparin (LOVENOX) injection 40 mg, Daily  ondansetron (ZOFRAN-ODT) disintegrating tablet 4 mg, Q8H PRN   Or  ondansetron (ZOFRAN) injection 4 mg, Q6H PRN  polyethylene glycol (GLYCOLAX) packet 17 g, Daily PRN  acetaminophen (TYLENOL) tablet 650 mg, Q6H PRN   Or  acetaminophen (TYLENOL) suppository 650 mg, Q6H PRN  atorvastatin (LIPITOR) tablet 40 mg, Daily        Objective:  /77   Pulse 90   Temp 97.8 °F (36.6 °C) (Oral)   Resp 16   Ht 5' 5\" (1.651 m)   Wt 240 lb (108.9 kg)   SpO2 95%   BMI 39.94 kg/m²   No intake or output data in the 24 hours ending 09/04/21 0734   Wt Readings from Last 3 Encounters:   09/03/21 240 lb (108.9 kg)   08/13/21 236 lb 9.6 oz (107.3 kg)   07/16/21 235 lb (106.6 kg)       Physical Examination:   General appearance: No apparent distress appears stated age and cooperative. HEENT Normal cephalic, atraumatic without obvious deformity. Pupils equal, round, and reactive to light. Extra ocular muscles intact. Conjunctivae/corneas clear. Lungs: Clear to auscultation, bilaterally without Rales/Wheezes/Rhonchi with good respiratory effort. Heart: Regular rate and rhythm with Normal S1/S2 without murmurs, rubs or gallops, point of maximum impulse non-displaced  Abdomen: Soft, non-tender or non-distended without rigidity or guarding and positive bowel sounds all four quadrants. Extremities: No clubbing, cyanosis, or edema bilaterally. Full range of motion without deformity and normal gait intact. Neurologic: Alert and oriented X 3, neurovascularly intact with sensory/motor intact upper extremities/lower extremities, bilaterally. Cranial nerves: II-XII intact, grossly non-focal.  Psychiatry: Appropriate affect. Not agitated  Skin: Warm, dry, normal turgor, no rash  Brisk capillary refill, peripheral pulses palpable     Labs and Tests:  CBC:   Recent Labs     09/03/21  1320   WBC 8.5   HGB 14.3        BMP:    Recent Labs     09/03/21  1405      K 3.8      CO2 28   BUN 16   CREATININE 0.6   GLUCOSE 108*     Hepatic:   Recent Labs     09/03/21  1405   AST 22   ALT 29   BILITOT 0.6   ALKPHOS 80     CTA HEAD NECK W CONTRAST   Final Result   Severe focal stenosis at the origin of the left subclavian artery. Otherwise, no acute abnormality or flow-limiting stenosis in the remainder of   the major arteries of the head and neck. CT HEAD WO CONTRAST   Final Result   No acute intracranial abnormality. Problem List  Active Problems:    Syncope and collapse  Resolved Problems:    * No resolved hospital problems.  Mesfin Torres MD   9/4/2021 7:34 AM

## 2021-09-05 LAB
ANION GAP SERPL CALCULATED.3IONS-SCNC: 8 MMOL/L (ref 3–16)
BUN BLDV-MCNC: 13 MG/DL (ref 7–20)
CALCIUM SERPL-MCNC: 9.4 MG/DL (ref 8.3–10.6)
CHLORIDE BLD-SCNC: 105 MMOL/L (ref 99–110)
CO2: 27 MMOL/L (ref 21–32)
CREAT SERPL-MCNC: 0.6 MG/DL (ref 0.6–1.2)
EKG ATRIAL RATE: 70 BPM
EKG DIAGNOSIS: NORMAL
EKG P AXIS: 74 DEGREES
EKG P-R INTERVAL: 166 MS
EKG Q-T INTERVAL: 428 MS
EKG QRS DURATION: 96 MS
EKG QTC CALCULATION (BAZETT): 462 MS
EKG R AXIS: 52 DEGREES
EKG T AXIS: 78 DEGREES
EKG VENTRICULAR RATE: 70 BPM
GFR AFRICAN AMERICAN: >60
GFR NON-AFRICAN AMERICAN: >60
GLUCOSE BLD-MCNC: 114 MG/DL (ref 70–99)
POTASSIUM SERPL-SCNC: 4 MMOL/L (ref 3.5–5.1)
SODIUM BLD-SCNC: 140 MMOL/L (ref 136–145)

## 2021-09-05 PROCEDURE — 36415 COLL VENOUS BLD VENIPUNCTURE: CPT

## 2021-09-05 PROCEDURE — 2580000003 HC RX 258: Performed by: FAMILY MEDICINE

## 2021-09-05 PROCEDURE — 80048 BASIC METABOLIC PNL TOTAL CA: CPT

## 2021-09-05 PROCEDURE — 6370000000 HC RX 637 (ALT 250 FOR IP): Performed by: FAMILY MEDICINE

## 2021-09-05 PROCEDURE — 6370000000 HC RX 637 (ALT 250 FOR IP): Performed by: INTERNAL MEDICINE

## 2021-09-05 PROCEDURE — G0378 HOSPITAL OBSERVATION PER HR: HCPCS

## 2021-09-05 PROCEDURE — 93005 ELECTROCARDIOGRAM TRACING: CPT | Performed by: INTERNAL MEDICINE

## 2021-09-05 PROCEDURE — 93010 ELECTROCARDIOGRAM REPORT: CPT | Performed by: INTERNAL MEDICINE

## 2021-09-05 PROCEDURE — 1200000000 HC SEMI PRIVATE

## 2021-09-05 PROCEDURE — 99223 1ST HOSP IP/OBS HIGH 75: CPT | Performed by: INTERNAL MEDICINE

## 2021-09-05 RX ADMIN — LOSARTAN POTASSIUM 50 MG: 25 TABLET, FILM COATED ORAL at 08:39

## 2021-09-05 RX ADMIN — Medication 10 ML: at 08:39

## 2021-09-05 RX ADMIN — Medication 10 ML: at 21:42

## 2021-09-05 RX ADMIN — VENLAFAXINE HYDROCHLORIDE 37.5 MG: 37.5 CAPSULE, EXTENDED RELEASE ORAL at 08:39

## 2021-09-05 RX ADMIN — ATORVASTATIN CALCIUM 40 MG: 40 TABLET, FILM COATED ORAL at 21:39

## 2021-09-05 ASSESSMENT — PAIN SCALES - GENERAL
PAINLEVEL_OUTOF10: 0

## 2021-09-05 NOTE — PROGRESS NOTES
Pt stated to me tonight \"I would like to go home tomorrow, and do my echo outpatient, I don't want to sit here for 3 more days for one more test, and I do feel better. \" Told pt I would pass this information on to dayshift. She was told earlier if she leaves before Tuesday it will have to be AMA. This was explained to her, but she's hoping to speak with cardio tomorrow and see if Echo can be done outpatient.

## 2021-09-05 NOTE — CONSULTS
[address]   Cardiac Evaluation      Patient: Moses Fink  YOB: 1955         Chief Complaint   Patient presents with    Dizziness     pt brought in by [facility] with c/o dizziness since a little after 11. pt states she's had 3 episodes in the past 6 months but this has been the most intense. Referring provider: Juancarlos Vallejo MD    History of Present Illness:   76 yo WF with hx of htn, hyperlipidemia, smoking, and subclavian stenosis admitted after have a presyncopal spell characterized by diaphoresis, dizziness and nausea which persisted for hours. Her BP in the ER was 148/95. She denies any chest pain or previous hx of CAD. Her EKG is abnormal with  Anterior ischemia on 2 tracings 2 days apart with neg trops. There are no old EKGs for comparison. She has never had any cardiac testing. She has been seen by Dr Sridevi Verduzco on this admit for a 75% stenosis of the left subclavian which she has apparently had for at least 2 years. She has had 2 other presyncopal spells which were similar to this one months ago. A chest CT of 2019 appears to show coronary calcifications (not in the report, but neither was the subclavian stenosis)    Past Medical History:   has a past medical history of History of lung cancer, Hyperlipidemia, and Hypertension. Surgical History:   has a past surgical history that includes Lung surgery (2011) and back surgery (2014).      Current Facility-Administered Medications   Medication Dose Route Frequency Provider Last Rate Last Admin    losartan (COZAAR) tablet 50 mg  50 mg Oral Daily Shawnee Neely MD   50 mg at 09/05/21 0839    venlafaxine (EFFEXOR XR) extended release capsule 37.5 mg  37.5 mg Oral Daily Daniela Lozoya MD   37.5 mg at 09/05/21 0839    clonazePAM (KLONOPIN) tablet 0.25 mg  0.25 mg Oral BID PRN Jorje Marshall MD        sodium chloride flush 0.9 % injection 5-40 mL  5-40 mL IntraVENous 2 times per day Jorje Marshall MD   10 mL at 09/05/21 0839    sodium chloride flush 0.9 % injection 5-40 mL  5-40 mL IntraVENous PRN Heydi Nam MD        0.9 % sodium chloride infusion  25 mL IntraVENous PRN Heydi Nam MD        enoxaparin (LOVENOX) injection 40 mg  40 mg SubCUTAneous Daily Heydi Nam MD   40 mg at 09/04/21 0906    ondansetron (ZOFRAN-ODT) disintegrating tablet 4 mg  4 mg Oral Q8H PRN Heydi Nam MD        Or    ondansetron (ZOFRAN) injection 4 mg  4 mg IntraVENous Q6H PRN Heydi Nam MD        polyethylene glycol (GLYCOLAX) packet 17 g  17 g Oral Daily PRN Heydi Nam MD        acetaminophen (TYLENOL) tablet 650 mg  650 mg Oral Q6H PRN Heydi Nam MD        Or   [employer] acetaminophen (TYLENOL) suppository 650 mg  650 mg Rectal Q6H PRN Heydi Nam MD        atorvastatin (LIPITOR) tablet 40 mg  40 mg Oral Daily Heydi Nam MD   40 mg at 09/04/21 2108       Allergies:  Codeine     Social History:  Social History     Socioeconomic History    Marital status:      Spouse name: Not on file    Number of children: Not on file    Years of education: Not on file    Highest education level: Not on file   Occupational History    Not on file   Tobacco Use    Smoking status: Current Every Day Smoker     Packs/day: 0.50     Years: 50.00     Pack years: 25.00     Types: Cigarettes     Start date: 12/7/1970    Smokeless tobacco: Never Used    Tobacco comment: 2 packs a week   Substance and Sexual Activity    Alcohol use: Not Currently     Comment: stopped a year ago, felt like was drinking too much    Drug use: No    Sexual activity: Yes     Partners: Male     Birth control/protection: Post-menopausal   Other Topics Concern    Not on file   Social History Narrative    Not on file     Social Determinants of Health     Financial Resource Strain:     Difficulty of Paying Living Expenses:    Food Insecurity:     Worried About Running Out of Food in the Last Year:     Adriana of Food in the Last Year:    Transportation Needs:     Lack of Transportation (Medical):  Lack of Transportation (Non-Medical):    Physical Activity:     Days of Exercise per Week:     Minutes of Exercise per Session:    Stress:     Feeling of Stress :    Social Connections:     Frequency of Communication with Friends and Family:     Frequency of Social Gatherings with Friends and Family:     Attends Temple Services:     Active Member of Clubs or Organizations:     Attends Club or Organization Meetings:     Marital Status:    Intimate Partner Violence:     Fear of Current or Ex-Partner:     Emotionally Abused:     Physically Abused:     Sexually Abused:        Family History:   Family History   Problem Relation Age of Onset    Cancer Mother         thyroid    High Cholesterol Mother     COPD Sister     No Known Problems Father     Diabetes Maternal Grandfather     No Known Problems Brother     No Known Problems Brother     High Cholesterol Sister      Family history has been reviewed and not pertinent except as noted above. Review of Systems:   · Constitutional: there has been no unanticipated weight loss. No change in energy or activity level   · Eyes: No visual changes   · ENT: No Headaches, hearing loss or vertigo. No mouth sores or sore throat. · Cardiovascular: Reviewed in HPI  · Respiratory: No cough or wheezing, no sputum production. · Gastrointestinal: No abdominal pain, appetite loss, blood in stools. No change in bowel or bladder habits. · Genitourinary: No nocturia, dysuria, trouble voiding  · Musculoskeletal:  No gait disturbance, weakness or joint complaints. · Integumentary: No rash or pruritis. · Neurological: No headache, change in muscle strength, numbness or tingling. No change in gait, balance, coordination, mood, affect, memory, mentation, behavior.   · Psychiatric: No anxiety or depression  · Endocrine: No malaise or fever  · Hematologic/Lymphatic: No abnormal bruising or bleeding, blood clots or swollen lymph nodes. · Allergic/Immunologic: No nasal congestion or hives. Physical Examination:    Vitals:    09/04/21 2344 09/05/21 0604 09/05/21 0830 09/05/21 1119   BP: (!) 146/84 (!) 140/89 113/83 (!) 138/91   Pulse: 78 76 79 67   Resp: 16 16 17 16   Temp: 97.9 °F (36.6 °C) 98 °F (36.7 °C) 98.6 °F (37 °C) 98 °F (36.7 °C)   TempSrc: Oral Oral Oral Oral   SpO2: 94% 95% 96% 95%   Weight:       Height:         Body mass index is 39.34 kg/m². Wt Readings from Last 3 Encounters:   09/04/21 236 lb 6.4 oz (107.2 kg)   08/13/21 236 lb 9.6 oz (107.3 kg)   07/16/21 235 lb (106.6 kg)      BP Readings from Last 3 Encounters:   09/05/21 (!) 138/91   07/01/21 134/82   05/04/21 130/88        Physical Examination:    · CONSTITUTIONAL: Well developed, well nourished  · EYES: PERRLA. No xanthelasma, sclera non icteric  · EARS,NOSE,MOUTH,THROAT:  Mucous membranes moist, normal hearing  · NECK: Supple, JVP normal, thyroid not enlarged. Carotids 2+ without bruits  · RESPIRATORY: Normal effort, no rales or rhonchi  · CARDIOVASCULAR: Normal PMI, regular rate and rhythm, no murmurs, rub or gallop. No edema. Radial pulses present and equal  · CHEST: No scar or masses  · ABDOMEN: Normal bowel sounds. No masses or tenderness. No bruit  · MUSCULOSKELETAL: No clubbing or cyanosis. Moves all extremities well. Normal gait  · SKIN:  Warm and dry. No rashes  · NEUROLOGIC: Cranial nerves intact. Alert and oriented  · PSYCHIATRIC: Calm affect. Appears to have normal judgement and insight        Assessment/Plan  1. Presyncope - the most likely etiology would be vasovagal but she has atypical features in that the symptoms lasted for hours with normal BP in the ER and there was no obvious trigger such as a noxious event. 2. Abnormal EKG - her EKG is very suggestive of significant CAD and she has not had any cardiac testing with multiple RF for CAD and evidence of atherosclerotic disease on CT scanning.       3. Subclavian stenosis - not the etiology of her admission symptoms but a marker for atherosclerotic disease. I feel the pt should have inpatient evaluation of her heart and I have explained this to her.          Thank you for allowing to me to participate in the care of Courtney Maximilian.

## 2021-09-05 NOTE — PROGRESS NOTES
HOSPITALISTS PROGRESS NOTE    9/5/2021 3:46 PM        Name: Saurabh Ibarra .              Admitted: 9/3/2021  Primary Care Provider: Clementina Argueta MD (Tel: [phone])      CC: Dizziness    Brief Course: This 77 y.o. female with h/o HTN , DM , anxiety presented with c/o dizziness and near syncopal episode. She went for walk in the mall when she felt really warm and felt dizzy. She had to sit down holding her head in her hand for several minutes. Store staff gave her some water and pop escobar and called EMS. The pt takes her BP meds in the morning. Reports occasional sBP readings as low as 101. Has had couple of similar episode in the past 6  months. Denies fall, LOC. Denies fever, chills, leg edema, chest pain, palpitations, abdominal pain /n/v/dc. No focal neurological deficit    ED work up showed EKG NSR  Troponin < 0.01. CT head is neg for hemorrhage or ischemic changes. CTA head and neck showed severe stonosis at the origin of subclavian artery. Interval history:   Patient seen and examined today  Overnight events and interval insulin notes reviewed  Denied any chest pain, shortness of breath, palpitation dizziness or syncope  Repeat EKG recent history of CAD; cardiology on board and plan for stress test on Tuesday    Assessment & Plan:     Near Syncopal episode  Patient reported 3 near syncopal episode in the past 6 months  Orthostatic vitals negative  Losartan dose reduced and timing changed to overnight  ECHO ordered  Monitor on telemetry     Abnormal EKG: Cardiology on board; plan for stress and echo on Tuesday  trend troponin   Monitor on telemetry    Hypertension: On losartan at home  Dose reduced to half  Monitor blood pressure closely      Asymptomatic left subclavian stenosis:   CTA head and neck showed severe stonosis at the origin of subclavian artery.   Vascular surgery consulted; follow-up in 4 weeks outpatient recommended for possible left subclavian stent        DVT PPX:   Code:Full Code  Diet: ADULT DIET; Regular    Disposition:     Current Medications  losartan (COZAAR) tablet 50 mg, Daily  venlafaxine (EFFEXOR XR) extended release capsule 37.5 mg, Daily  clonazePAM (KLONOPIN) tablet 0.25 mg, BID PRN  sodium chloride flush 0.9 % injection 5-40 mL, 2 times per day  sodium chloride flush 0.9 % injection 5-40 mL, PRN  0.9 % sodium chloride infusion, PRN  enoxaparin (LOVENOX) injection 40 mg, Daily  ondansetron (ZOFRAN-ODT) disintegrating tablet 4 mg, Q8H PRN   Or  ondansetron (ZOFRAN) injection 4 mg, Q6H PRN  polyethylene glycol (GLYCOLAX) packet 17 g, Daily PRN  acetaminophen (TYLENOL) tablet 650 mg, Q6H PRN   Or  acetaminophen (TYLENOL) suppository 650 mg, Q6H PRN  atorvastatin (LIPITOR) tablet 40 mg, Daily        Objective:  BP (!) 138/91   Pulse 67   Temp 98 °F (36.7 °C) (Oral)   Resp 16   Ht 5' 5\" (1.651 m)   Wt 236 lb 6.4 oz (107.2 kg)   SpO2 95%   BMI 39.34 kg/m²     Intake/Output Summary (Last 24 hours) at 9/5/2021 1546  Last data filed at 9/5/2021 0830  Gross per 24 hour   Intake 610 ml   Output --   Net 610 ml      Wt Readings from Last 3 Encounters:   09/04/21 236 lb 6.4 oz (107.2 kg)   08/13/21 236 lb 9.6 oz (107.3 kg)   07/16/21 235 lb (106.6 kg)       Physical Examination:   General appearance: No apparent distress appears stated age and cooperative. HEENT Normal cephalic, atraumatic without obvious deformity. Pupils equal, round, and reactive to light. Extra ocular muscles intact. Conjunctivae/corneas clear. Lungs: Clear to auscultation, bilaterally without Rales/Wheezes/Rhonchi with good respiratory effort. Heart: Regular rate and rhythm with Normal S1/S2 without murmurs, rubs or gallops, point of maximum impulse non-displaced  Abdomen: Soft, non-tender or non-distended without rigidity or guarding and positive bowel sounds all four quadrants.   Extremities: No clubbing, cyanosis, or edema bilaterally. Full range of motion without deformity and normal gait intact. Neurologic: Alert and oriented X 3, neurovascularly intact with sensory/motor intact upper extremities/lower extremities, bilaterally. Cranial nerves: II-XII intact, grossly non-focal.  Psychiatry: Appropriate affect. Not agitated  Skin: Warm, dry, normal turgor, no rash  Brisk capillary refill, peripheral pulses palpable     Labs and Tests:  CBC:   Recent Labs     09/03/21  1320   WBC 8.5   HGB 14.3        BMP:    Recent Labs     09/03/21  1405 09/04/21  0115 09/05/21  0425    138 140   K 3.8 3.7 4.0    103 105   CO2 28 27 27   BUN 16 15 13   CREATININE 0.6 0.6 0.6   GLUCOSE 108* 105* 114*     Hepatic:   Recent Labs     09/03/21  1405   AST 22   ALT 29   BILITOT 0.6   ALKPHOS 80     CTA HEAD NECK W CONTRAST   Final Result   Severe focal stenosis at the origin of the left subclavian artery. Otherwise, no acute abnormality or flow-limiting stenosis in the remainder of   the major arteries of the head and neck. CT HEAD WO CONTRAST   Final Result   No acute intracranial abnormality. Problem List  Active Problems:    Syncope and collapse  Resolved Problems:    * No resolved hospital problems.  Yong Westbrook MD   9/5/2021 3:46 PM

## 2021-09-06 LAB
ANION GAP SERPL CALCULATED.3IONS-SCNC: 9 MMOL/L (ref 3–16)
BUN BLDV-MCNC: 14 MG/DL (ref 7–20)
CALCIUM SERPL-MCNC: 9.7 MG/DL (ref 8.3–10.6)
CHLORIDE BLD-SCNC: 106 MMOL/L (ref 99–110)
CO2: 27 MMOL/L (ref 21–32)
CREAT SERPL-MCNC: 0.5 MG/DL (ref 0.6–1.2)
GFR AFRICAN AMERICAN: >60
GFR NON-AFRICAN AMERICAN: >60
GLUCOSE BLD-MCNC: 123 MG/DL (ref 70–99)
POTASSIUM SERPL-SCNC: 4.2 MMOL/L (ref 3.5–5.1)
SODIUM BLD-SCNC: 142 MMOL/L (ref 136–145)

## 2021-09-06 PROCEDURE — 6370000000 HC RX 637 (ALT 250 FOR IP): Performed by: INTERNAL MEDICINE

## 2021-09-06 PROCEDURE — 80048 BASIC METABOLIC PNL TOTAL CA: CPT

## 2021-09-06 PROCEDURE — 1200000000 HC SEMI PRIVATE

## 2021-09-06 PROCEDURE — 99231 SBSQ HOSP IP/OBS SF/LOW 25: CPT | Performed by: INTERNAL MEDICINE

## 2021-09-06 PROCEDURE — 2580000003 HC RX 258: Performed by: FAMILY MEDICINE

## 2021-09-06 PROCEDURE — 6370000000 HC RX 637 (ALT 250 FOR IP): Performed by: FAMILY MEDICINE

## 2021-09-06 PROCEDURE — 6360000002 HC RX W HCPCS: Performed by: FAMILY MEDICINE

## 2021-09-06 PROCEDURE — 36415 COLL VENOUS BLD VENIPUNCTURE: CPT

## 2021-09-06 PROCEDURE — 93005 ELECTROCARDIOGRAM TRACING: CPT | Performed by: INTERNAL MEDICINE

## 2021-09-06 RX ORDER — LANOLIN ALCOHOL/MO/W.PET/CERES
3 CREAM (GRAM) TOPICAL NIGHTLY PRN
Status: DISCONTINUED | OUTPATIENT
Start: 2021-09-06 | End: 2021-09-07 | Stop reason: HOSPADM

## 2021-09-06 RX ADMIN — ATORVASTATIN CALCIUM 40 MG: 40 TABLET, FILM COATED ORAL at 21:44

## 2021-09-06 RX ADMIN — VENLAFAXINE HYDROCHLORIDE 37.5 MG: 37.5 CAPSULE, EXTENDED RELEASE ORAL at 09:43

## 2021-09-06 RX ADMIN — Medication 10 ML: at 21:44

## 2021-09-06 RX ADMIN — Medication 10 ML: at 09:43

## 2021-09-06 RX ADMIN — ENOXAPARIN SODIUM 40 MG: 40 INJECTION SUBCUTANEOUS at 09:43

## 2021-09-06 RX ADMIN — LOSARTAN POTASSIUM 50 MG: 25 TABLET, FILM COATED ORAL at 09:43

## 2021-09-06 RX ADMIN — MELATONIN TAB 3 MG 3 MG: 3 TAB at 21:44

## 2021-09-06 ASSESSMENT — PAIN SCALES - WONG BAKER
WONGBAKER_NUMERICALRESPONSE: 0

## 2021-09-06 ASSESSMENT — PAIN SCALES - GENERAL
PAINLEVEL_OUTOF10: 0

## 2021-09-06 NOTE — PLAN OF CARE
Problem: Falls - Risk of:  Goal: Will remain free from falls  Description: Will remain free from falls  9/6/2021 0552 by Yolanda Butler RN  Outcome: Ongoing     Problem: SAFETY  Goal: Free from accidental physical injury  9/6/2021 0552 by Yolanda Butler RN  Outcome: Ongoing     Problem: DAILY CARE  Goal: Daily care needs are met  9/6/2021 0552 by Yolanda Butler RN  Outcome: Ongoing     Problem: SKIN INTEGRITY  Goal: Skin integrity is maintained or improved  9/6/2021 0552 by Yolanda Butler RN  Outcome: Ongoing     Vitals:    09/06/21 0525   BP: 116/80   Pulse: 73   Resp: 18   Temp: 98 °F (36.7 °C)   SpO2: 97%

## 2021-09-06 NOTE — PLAN OF CARE
Problem: Falls - Risk of:  Goal: Will remain free from falls  Description: Will remain free from falls  9/6/2021 1723 by Baldemar Avina RN  Outcome: Ongoing  9/6/2021 0552 by Etienne Noova RN  Outcome: Ongoing  Goal: Absence of physical injury  Description: Absence of physical injury  Outcome: Ongoing     Problem: SAFETY  Goal: Free from accidental physical injury  9/6/2021 1723 by Baldemar Avina RN  Outcome: Ongoing  9/6/2021 0552 by Etienne Novoa RN  Outcome: Ongoing  Goal: Free from intentional harm  Outcome: Ongoing     Problem: DAILY CARE  Goal: Daily care needs are met  9/6/2021 1723 by Baldemar Avina RN  Outcome: Ongoing  9/6/2021 0552 by Etienne Novoa RN  Outcome: Ongoing     Problem: PAIN  Goal: Patient's pain/discomfort is manageable  9/6/2021 1723 by Baldemar Avina RN  Outcome: Ongoing  9/6/2021 0552 by Etienne Novoa RN  Outcome: Ongoing     Problem: SKIN INTEGRITY  Goal: Skin integrity is maintained or improved  9/6/2021 1723 by Baldemar Avina RN  Outcome: Ongoing  9/6/2021 0552 by Etienne Novoa RN  Outcome: Ongoing     Problem: KNOWLEDGE DEFICIT  Goal: Patient/S.O. demonstrates understanding of disease process, treatment plan, medications, and discharge instructions.   Outcome: Ongoing     Problem: DISCHARGE BARRIERS  Goal: Patient's continuum of care needs are met  Outcome: Ongoing

## 2021-09-06 NOTE — PROGRESS NOTES
[address]   Progress Note  Cardiology    Chief Complaint   Patient presents with    Dizziness     pt brought in by [employer]  [facility] with c/o dizziness since a little after 11. pt states she's had 3 episodes in the past 6 months but this has been the most intense. HPI: No events overnight. For echo and GXT in the am due to abnormal EKG and syncope on admit. Medications/Labs all Reviewed    Recent Labs     09/03/21  1320   WBC 8.5   HGB 14.3   HCT 43.2   MCV 93.5        Recent Labs     09/06/21  0438   CREATININE 0.5*   BUN 14      K 4.2      CO2 27     No results for input(s): INR, PROTIME in the last 72 hours. MEDICATIONS:    losartan  50 mg Oral Daily    venlafaxine  37.5 mg Oral Daily    sodium chloride flush  5-40 mL IntraVENous 2 times per day    enoxaparin  40 mg SubCUTAneous Daily    atorvastatin  40 mg Oral Daily      sodium chloride         Physical Examination:    /77   Pulse 76   Temp 97.8 °F (36.6 °C) (Oral)   Resp 18   Ht 5' 5\" (1.651 m)   Wt 236 lb 6.4 oz (107.2 kg)   SpO2 97%   BMI 39.34 kg/m²     Respiratory:  · Resp Assessment: Normal respiratory effort  · Resp Auscultation: Clear to auscultation bilaterally   Cardiovascular:  · Auscultation: regular rate and rhythm, normal S1S2, no murmur, rub or gallop  · Palpation:  Nl PMI  · JVP:  normal  · Extremities: No Edema  Abdomen:  · Soft, non-tender  · Normal bowel sounds  Extremities:  ·  No Cyanosis or Clubbing  Neurological/Psychiatric:  · Oriented to time, place, and person  · Non-anxious  Skin Warm and dry    Assessment:    Active Problems:        Presyncope - the most likely etiology would be vasovagal but she has atypical features in that the symptoms lasted for hours with normal BP in the ER and there was no obvious trigger such as a noxious event.     2.  Abnormal EKG - her EKG is very suggestive of significant CAD and she has not had any cardiac testing with multiple RF for CAD and evidence of atherosclerotic disease on CT scanning.       3. Subclavian stenosis - not the etiology of her admission symptoms but a marker for atherosclerotic disease. GXT and echo tomorrow. She also needs f/u with Dr Dana Oppenheim.      Carmen Hassan MD, 9/6/2021 10:58 AM

## 2021-09-06 NOTE — PROGRESS NOTES
HOSPITALISTS PROGRESS NOTE    9/6/2021 10:39 AM        Name: Saurabh Ibarra .              Admitted: 9/3/2021  Primary Care Provider: Clementina Argueta MD (Tel: [phone])      CC: Dizziness    Brief Course: This 77 y.o. female with h/o HTN , DM , anxiety presented with c/o dizziness and near syncopal episode. She went for walk in the mall when she felt really warm and felt dizzy. She had to sit down holding her head in her hand for several minutes. Store staff gave her some water and pop escobar and called EMS. The pt takes her BP meds in the morning. Reports occasional sBP readings as low as 101. Has had couple of similar episode in the past 6  months. Denies fall, LOC. Denies fever, chills, leg edema, chest pain, palpitations, abdominal pain /n/v/dc. No focal neurological deficit    ED work up showed EKG NSR  Troponin < 0.01. CT head is neg for hemorrhage or ischemic changes. CTA head and neck showed severe stonosis at the origin of subclavian artery. Interval history:   Patient seen and examined today  Overnight events and interval ancillary notes reviewed  Denied any chest pain, shortness of breath, palpitation, dizziness or syncope  Cardiology on board plan for echo and stress test tomorrow      Assessment & Plan:     Near Syncopal episode:  reported 3 near syncopal episode in the past 6 months  Orthostatic vitals negative  Losartan dose reduced and timing changed to overnight  ECHO ordered  Monitor on telemetry     Abnormal EKG: Cardiology on board; plan for stress and echo on Tuesday  trend troponin   Monitor on telemetry    Hypertension: On losartan at home  Dose reduced to half  Monitor blood pressure closely    Asymptomatic left subclavian stenosis:   CTA head and neck showed severe stonosis at the origin of subclavian artery.   Vascular surgery consulted; follow-up in 4 weeks outpatient recommended for possible left subclavian stent        DVT PPX:   Code:Full Code  Diet: ADULT DIET; Regular    Disposition:     Current Medications  lip balm petroleum free (PHYTOPLEX) stick, PRN  losartan (COZAAR) tablet 50 mg, Daily  venlafaxine (EFFEXOR XR) extended release capsule 37.5 mg, Daily  clonazePAM (KLONOPIN) tablet 0.25 mg, BID PRN  sodium chloride flush 0.9 % injection 5-40 mL, 2 times per day  sodium chloride flush 0.9 % injection 5-40 mL, PRN  0.9 % sodium chloride infusion, PRN  enoxaparin (LOVENOX) injection 40 mg, Daily  ondansetron (ZOFRAN-ODT) disintegrating tablet 4 mg, Q8H PRN   Or  ondansetron (ZOFRAN) injection 4 mg, Q6H PRN  polyethylene glycol (GLYCOLAX) packet 17 g, Daily PRN  acetaminophen (TYLENOL) tablet 650 mg, Q6H PRN   Or  acetaminophen (TYLENOL) suppository 650 mg, Q6H PRN  atorvastatin (LIPITOR) tablet 40 mg, Daily        Objective:  /77   Pulse 76   Temp 97.8 °F (36.6 °C) (Oral)   Resp 18   Ht 5' 5\" (1.651 m)   Wt 236 lb 6.4 oz (107.2 kg)   SpO2 97%   BMI 39.34 kg/m²   No intake or output data in the 24 hours ending 09/06/21 1039   Wt Readings from Last 3 Encounters:   09/04/21 236 lb 6.4 oz (107.2 kg)   08/13/21 236 lb 9.6 oz (107.3 kg)   07/16/21 235 lb (106.6 kg)       Physical Examination:   General appearance: No apparent distress appears stated age and cooperative. HEENT Normal cephalic, atraumatic without obvious deformity. Pupils equal, round, and reactive to light. Extra ocular muscles intact. Conjunctivae/corneas clear. Lungs: Clear to auscultation, bilaterally without Rales/Wheezes/Rhonchi with good respiratory effort. Heart: Regular rate and rhythm with Normal S1/S2 without murmurs, rubs or gallops, point of maximum impulse non-displaced  Abdomen: Soft, non-tender or non-distended without rigidity or guarding and positive bowel sounds all four quadrants. Extremities: No clubbing, cyanosis, or edema bilaterally. Full range of motion without deformity and normal gait intact.   Neurologic: Alert and oriented X 3, neurovascularly intact with sensory/motor intact upper extremities/lower extremities, bilaterally. Cranial nerves: II-XII intact, grossly non-focal.  Psychiatry: Appropriate affect. Not agitated  Skin: Warm, dry, normal turgor, no rash  Brisk capillary refill, peripheral pulses palpable     Labs and Tests:  CBC:   Recent Labs     09/03/21  1320   WBC 8.5   HGB 14.3        BMP:    Recent Labs     09/04/21  0115 09/05/21  0425 09/06/21  0438    140 142   K 3.7 4.0 4.2    105 106   CO2 27 27 27   BUN 15 13 14   CREATININE 0.6 0.6 0.5*   GLUCOSE 105* 114* 123*     Hepatic:   Recent Labs     09/03/21  1405   AST 22   ALT 29   BILITOT 0.6   ALKPHOS 80     CTA HEAD NECK W CONTRAST   Final Result   Severe focal stenosis at the origin of the left subclavian artery. Otherwise, no acute abnormality or flow-limiting stenosis in the remainder of   the major arteries of the head and neck. CT HEAD WO CONTRAST   Final Result   No acute intracranial abnormality. NM MYOCARDIAL SPECT REST EXERCISE OR RX    (Results Pending)       Problem List  Active Problems:    Syncope and collapse  Resolved Problems:    * No resolved hospital problems.  Brooklyn Shoemaker MD   9/6/2021 10:39 AM

## 2021-09-06 NOTE — PLAN OF CARE
Problem: DAILY CARE  Goal: Daily care needs are met  9/6/2021 1723 by Chris Talavera RN  Outcome: Ongoing  9/6/2021 0552 by Kavon Nieto RN  Outcome: Ongoing     Problem: PAIN  Goal: Patient's pain/discomfort is manageable  9/6/2021 1723 by Chris Talavera RN  Outcome: Ongoing  9/6/2021 0552 by Kavon Nieto RN  Outcome: Ongoing     Problem: SKIN INTEGRITY  Goal: Skin integrity is maintained or improved  9/6/2021 1723 by Chris Talavera RN  Outcome: Ongoing  9/6/2021 0552 by Kavon Nieto RN  Outcome: Ongoing     Problem: KNOWLEDGE DEFICIT  Goal: Patient/S.O. demonstrates understanding of disease process, treatment plan, medications, and discharge instructions.   Outcome: Ongoing     Problem: DISCHARGE BARRIERS  Goal: Patient's continuum of care needs are met  Outcome: Ongoing     Problem: Falls - Risk of:  Goal: Will remain free from falls  Description: Will remain free from falls  9/6/2021 1723 by Chris Talavera RN  Outcome: Ongoing  9/6/2021 0552 by Kavon Nieto RN  Outcome: Ongoing  Goal: Absence of physical injury  Description: Absence of physical injury  Outcome: Ongoing

## 2021-09-07 VITALS
WEIGHT: 236.4 LBS | OXYGEN SATURATION: 96 % | BODY MASS INDEX: 39.39 KG/M2 | RESPIRATION RATE: 17 BRPM | TEMPERATURE: 97.6 F | HEIGHT: 65 IN | DIASTOLIC BLOOD PRESSURE: 81 MMHG | HEART RATE: 80 BPM | SYSTOLIC BLOOD PRESSURE: 109 MMHG

## 2021-09-07 DIAGNOSIS — R55 PRE-SYNCOPE: ICD-10-CM

## 2021-09-07 DIAGNOSIS — R42 DIZZINESS: Primary | ICD-10-CM

## 2021-09-07 PROBLEM — E66.9 OBESITY (BMI 30-39.9): Status: ACTIVE | Noted: 2021-09-07

## 2021-09-07 PROBLEM — I77.1 STENOSIS OF LEFT SUBCLAVIAN ARTERY (HCC): Status: ACTIVE | Noted: 2021-09-07

## 2021-09-07 LAB
ANION GAP SERPL CALCULATED.3IONS-SCNC: 6 MMOL/L (ref 3–16)
BUN BLDV-MCNC: 18 MG/DL (ref 7–20)
CALCIUM SERPL-MCNC: 9.6 MG/DL (ref 8.3–10.6)
CHLORIDE BLD-SCNC: 105 MMOL/L (ref 99–110)
CO2: 28 MMOL/L (ref 21–32)
CREAT SERPL-MCNC: 0.7 MG/DL (ref 0.6–1.2)
EKG ATRIAL RATE: 77 BPM
EKG DIAGNOSIS: NORMAL
EKG P AXIS: 78 DEGREES
EKG P-R INTERVAL: 166 MS
EKG Q-T INTERVAL: 376 MS
EKG QRS DURATION: 102 MS
EKG QTC CALCULATION (BAZETT): 425 MS
EKG R AXIS: 22 DEGREES
EKG T AXIS: 76 DEGREES
EKG VENTRICULAR RATE: 77 BPM
GFR AFRICAN AMERICAN: >60
GFR NON-AFRICAN AMERICAN: >60
GLUCOSE BLD-MCNC: 118 MG/DL (ref 70–99)
LV EF: 53 %
LV EF: 54 %
LVEF MODALITY: NORMAL
LVEF MODALITY: NORMAL
POTASSIUM SERPL-SCNC: 4.2 MMOL/L (ref 3.5–5.1)
SODIUM BLD-SCNC: 139 MMOL/L (ref 136–145)

## 2021-09-07 PROCEDURE — 80048 BASIC METABOLIC PNL TOTAL CA: CPT

## 2021-09-07 PROCEDURE — 99233 SBSQ HOSP IP/OBS HIGH 50: CPT | Performed by: NURSE PRACTITIONER

## 2021-09-07 PROCEDURE — 78452 HT MUSCLE IMAGE SPECT MULT: CPT | Performed by: INTERNAL MEDICINE

## 2021-09-07 PROCEDURE — 6360000002 HC RX W HCPCS: Performed by: INTERNAL MEDICINE

## 2021-09-07 PROCEDURE — 6370000000 HC RX 637 (ALT 250 FOR IP): Performed by: FAMILY MEDICINE

## 2021-09-07 PROCEDURE — A9502 TC99M TETROFOSMIN: HCPCS | Performed by: INTERNAL MEDICINE

## 2021-09-07 PROCEDURE — 6360000002 HC RX W HCPCS: Performed by: FAMILY MEDICINE

## 2021-09-07 PROCEDURE — 93306 TTE W/DOPPLER COMPLETE: CPT

## 2021-09-07 PROCEDURE — 36415 COLL VENOUS BLD VENIPUNCTURE: CPT

## 2021-09-07 PROCEDURE — 93242 EXT ECG>48HR<7D RECORDING: CPT | Performed by: INTERNAL MEDICINE

## 2021-09-07 PROCEDURE — 2580000003 HC RX 258: Performed by: FAMILY MEDICINE

## 2021-09-07 PROCEDURE — 93010 ELECTROCARDIOGRAM REPORT: CPT | Performed by: INTERNAL MEDICINE

## 2021-09-07 PROCEDURE — 6370000000 HC RX 637 (ALT 250 FOR IP): Performed by: INTERNAL MEDICINE

## 2021-09-07 PROCEDURE — 3430000000 HC RX DIAGNOSTIC RADIOPHARMACEUTICAL: Performed by: INTERNAL MEDICINE

## 2021-09-07 PROCEDURE — 93017 CV STRESS TEST TRACING ONLY: CPT | Performed by: INTERNAL MEDICINE

## 2021-09-07 RX ORDER — ATORVASTATIN CALCIUM 40 MG/1
40 TABLET, FILM COATED ORAL DAILY
Qty: 30 TABLET | Refills: 0 | Status: SHIPPED | OUTPATIENT
Start: 2021-09-07 | End: 2021-10-14 | Stop reason: SDUPTHER

## 2021-09-07 RX ORDER — LOSARTAN POTASSIUM 50 MG/1
50 TABLET ORAL DAILY
Qty: 30 TABLET | Refills: 0 | Status: SHIPPED | OUTPATIENT
Start: 2021-09-08 | End: 2021-10-14 | Stop reason: SDUPTHER

## 2021-09-07 RX ADMIN — REGADENOSON 0.4 MG: 0.08 INJECTION, SOLUTION INTRAVENOUS at 09:04

## 2021-09-07 RX ADMIN — ENOXAPARIN SODIUM 40 MG: 40 INJECTION SUBCUTANEOUS at 11:29

## 2021-09-07 RX ADMIN — LOSARTAN POTASSIUM 50 MG: 25 TABLET, FILM COATED ORAL at 11:29

## 2021-09-07 RX ADMIN — TETROFOSMIN 10 MILLICURIE: 1.38 INJECTION, POWDER, LYOPHILIZED, FOR SOLUTION INTRAVENOUS at 07:58

## 2021-09-07 RX ADMIN — TETROFOSMIN 30 MILLICURIE: 1.38 INJECTION, POWDER, LYOPHILIZED, FOR SOLUTION INTRAVENOUS at 09:12

## 2021-09-07 RX ADMIN — VENLAFAXINE HYDROCHLORIDE 37.5 MG: 37.5 CAPSULE, EXTENDED RELEASE ORAL at 11:29

## 2021-09-07 RX ADMIN — Medication 10 ML: at 11:29

## 2021-09-07 ASSESSMENT — PAIN SCALES - WONG BAKER
WONGBAKER_NUMERICALRESPONSE: 0

## 2021-09-07 ASSESSMENT — PAIN SCALES - GENERAL: PAINLEVEL_OUTOF10: 0

## 2021-09-07 NOTE — DISCHARGE INSTR - COC
Continuity of Care Form    Patient Name: Chencho Ferrer   :  1955  MRN:  [id number]    Admit date:  9/3/2021  Discharge date:  ***    Code Status Order: Full Code   Advance Directives:     Admitting Physician:  Eyal Zamora MD  PCP: John Ferreira MD    Discharging Nurse: [facility] Unit/Room#: 5PI-8877/4713-40  Discharging Unit Phone Number: ***    Emergency Contact:   Extended Emergency Contact Information  Primary Emergency Contact: BeeBaldemar  Address: [address] Phone: [phone]  Mobile Phone: [phone]  Relation: Spouse    Past Surgical History:  Past Surgical History:   Procedure Laterality Date    BACK SURGERY      spinal stenosis    LUNG SURGERY      left VATS for stage IA adenocarcinoma       Immunization History:   Immunization History   Administered Date(s) Administered    COVID-19, Moderna, PF, 100mcg/0.5mL 2021, 2021    Influenza Vaccine, unspecified formulation 10/03/2013, 2018    Influenza Virus Vaccine 10/03/2013, 10/20/2015, 2018, 2020    Influenza, High Dose (Fluzone 65 yrs and older) 2019    Influenza, Intradermal, Quadrivalent, Preservative Free 2017    Pneumococcal Conjugate 13-valent (Kzdyujg87) 2019    Pneumococcal Polysaccharide (Icbklkagi74) 2016, 2021    Tdap (Boostrix, Adacel) 2017    Zoster Live (Zostavax) 2014, 2015       Active Problems:  Patient Active Problem List   Diagnosis Code    Other hyperlipidemia E78.49    Essential hypertension I10    Tobacco abuse Z72.0    B12 deficiency E53.8    Folate deficiency E53.8    Class 2 obesity due to excess calories without serious comorbidity with body mass index (BMI) of 39.0 to 39.9 in adult E66.09, Z68.39    Menopausal symptoms N95.1    Anxiety F41.9    IGT (impaired glucose tolerance) R73.02    Syncope and collapse R55    Obesity (BMI 30-39. 9) E66.9    Stenosis of left subclavian artery (HCC) I77.1       Isolation/Infection:   Isolation          No Isolation        Patient Infection Status     None to display          Nurse Assessment:  Last Vital Signs: /81   Pulse 80   Temp 97.6 °F (36.4 °C) (Oral)   Resp 17   Ht 5' 5\" (1.651 m)   Wt 236 lb 6.4 oz (107.2 kg)   SpO2 96%   BMI 39.34 kg/m²     Last documented pain score (0-10 scale): Pain Level: 0  Last Weight:   Wt Readings from Last 1 Encounters:   21 236 lb 6.4 oz (107.2 kg)     Mental Status:  {IP PT MENTAL STATUS:}    IV Access:  { ELIAS IV ACCESS:699589431}    Nursing Mobility/ADLs:  Walking   {P DME AFE}  Transfer  {P DME OTGJ:506775880}  Bathing  {P DME EJGQ:813413335}  Dressing  {P DME DUGU:705989873}  Toileting  {P DME HLGI:891386442}  Feeding  {[facility] DME EWXM:031618505}  Med Admin  {[facility] DME GODFREY:413351232}  Med Delivery   { ELIAS MED Delivery:673288045}    Wound Care Documentation and Therapy:        Elimination:  Continence:   · Bowel: {YES / BJ:88104}  · Bladder: {YES / J}  Urinary Catheter: {Urinary Catheter:190537373}   Colostomy/Ileostomy/Ileal Conduit: {YES / MW:28188}       Date of Last BM: ***    Intake/Output Summary (Last 24 hours) at 2021 1505  Last data filed at 2021 2144  Gross per 24 hour   Intake 10 ml   Output --   Net 10 ml     I/O last 3 completed shifts:   In: 10 [I.V.:10]  Out: -     Safety Concerns:     [employer] Safety Concerns:125092626}    Impairments/Disabilities:      [employer] Impairments/Disabilities:231690682}    Nutrition Therapy:  Current Nutrition Therapy:   [employer] Diet List:635249521}    Routes of Feeding: {P DME Other Feedings:768581154}  Liquids: {Slp liquid thickness:82287}  Daily Fluid Restriction: {CHP DME Yes amt example:859559857}  Last Modified Barium Swallow with Video (Video Swallowing Test): {Done Not Done [facility]:550479566}    Treatments at the Time of Hospital Discharge:

## 2021-09-07 NOTE — PLAN OF CARE
Problem: Falls - Risk of:  Goal: Will remain free from falls  Description: Will remain free from falls  9/7/2021 0643 by Meghana Jiménez RN  Outcome: Ongoing     Problem: SAFETY  Goal: Free from accidental physical injury  9/7/2021 0643 by Meghana Jiménez RN  Outcome: Ongoing     Problem: DAILY CARE  Goal: Daily care needs are met  9/7/2021 0643 by Meghana Jiménez RN  Outcome: Ongoing

## 2021-09-07 NOTE — ACP (ADVANCE CARE PLANNING)
Advanced Care Planning Note. Purpose of Encounter: Advanced care planning in light of syncope  Parties In Attendance: Patient  Decisional Capacity: Yes  Subjective: Patient denies CP, SOB, HA or dizziness  Objective: Cr 0.7  Goals of Care Determination: Patient wants full support (CPR, vent, surgery, HD, trach, PEG)  Plan:  SPECT, Cardio and Vascular consults  Code Status: Full code   Time spent on Advanced care Plannin minutes  Advanced Care Planning Documents: Completed advanced directives on chart,  is the POA.     Kelly Lucas MD  2021 2:26 PM

## 2021-09-07 NOTE — PLAN OF CARE
Problem: Falls - Risk of:  Goal: Will remain free from falls  Description: Will remain free from falls  9/7/2021 1433 by Cristofer Wells RN  Outcome: Completed  9/7/2021 0643 by Roque Sawyer RN  Outcome: Ongoing  Goal: Absence of physical injury  Description: Absence of physical injury  Outcome: Completed     Problem: SAFETY  Goal: Free from accidental physical injury  9/7/2021 1433 by Cristofer Wells RN  Outcome: Completed  9/7/2021 [facility] 5 by Roque Sawyer RN  Outcome: Ongoing  Goal: Free from intentional harm  Outcome: Completed     Problem: DAILY CARE  Goal: Daily care needs are met  9/7/2021 1433 by Cristofer Wells RN  Outcome: Completed  9/7/2021 0643 by Roque Sawyer RN  Outcome: Ongoing     Problem: PAIN  Goal: Patient's pain/discomfort is manageable  Outcome: Completed     Problem: SKIN INTEGRITY  Goal: Skin integrity is maintained or improved  Outcome: Completed     Problem: KNOWLEDGE DEFICIT  Goal: Patient/S.O. demonstrates understanding of disease process, treatment plan, medications, and discharge instructions.   Outcome: Completed     Problem: DISCHARGE BARRIERS  Goal: Patient's continuum of care needs are met  Outcome: Completed

## 2021-09-07 NOTE — DISCHARGE SUMMARY
Hospital Medicine Discharge Summary    Patient: Daljit Valadez     Gender: female  : 1955   Age: 77 y.o. MRN: [id number]    Admitting Physician: Jose Miguel Vega MD  Discharge Physician: Jean-Paul Maria MD     Code Status: Full Code     Admit Date: 9/3/2021   Discharge Date:   21    Disposition:  Home    Discharge Diagnoses: Active Hospital Problems    Diagnosis Date Noted    Obesity (BMI 30-39. 9) [E66.9] 2021    Stenosis of left subclavian artery ([address] [I77.1] 2021    Syncope and collapse [R55] 2021    Essential hypertension [I10] 2017    Other hyperlipidemia [E78.49] 2017       Follow-up appointments:  one week    Outpatient to do list: F/U with PCP, Cardio and Vascular    Condition at Discharge:  Stable    Hospital Course:   76 yo F with history of HTN, hyperlipidemia, obesity, h/o adenocarcinoma lung s/p ALLAN lobectomy, tobacco abuse came to ER with syncope. Admitted as inpatient for further management. Followed by Cardio and Vascular. Vascular noted:   \"States she has had several episodes of this over the last 6 months or so. Work-up here revealed a left subclavian stenosis and as a result vascular surgery has been consulted. It was also noted that patient had a significant left subclavian stenosis on CT scan of the chest in 2019 however was not commented in the radiology report. She does not relate any significant history of left arm claudication. Patient does continue to smoke 1/2 pack/day. Rafa Marr It is unlikely her left subclavian stenosis is contributing to her episodes of dizziness. There has been evidence of this left subclavian stenosis for several years. It is likely related to her underlying tobacco abuse. She has no significant left arm claudication and the symptoms were reviewed with her in detail today. I will plan to see her again in my office in 4 weeks to continue to monitor.   If she develops any significant left arm claudication or vertebrobasilar symptoms would then consider possible left subclavian stent. This was discussed with her in detail. \"    SPECT stress negative for reversible ischemia on 9/7/21. Ordered MRI Brain and UA; patient wanted to cancel and go home. PCP can consider these in follow up. Seen by Cardio. Lipitor increased to 40 mg daily and Losartan decreased to 50 mg daily. 1 week Holter ordered by Cardio. F/U with PCP, Cardio and Vascular. Discharge Medications:   Current Discharge Medication List        Current Discharge Medication List      CONTINUE these medications which have CHANGED    Details   atorvastatin (LIPITOR) 40 MG tablet Take 1 tablet by mouth daily  Qty: 30 tablet, Refills: 0      losartan (COZAAR) 50 MG tablet Take 1 tablet by mouth daily  Qty: 30 tablet, Refills: 0           Current Discharge Medication List      CONTINUE these medications which have NOT CHANGED    Details   venlafaxine (EFFEXOR XR) 37.5 MG extended release capsule Take 1 capsule by mouth daily  Qty: 90 capsule, Refills: 3      meloxicam (MOBIC) 15 MG tablet Take 1 tablet by mouth daily as needed for Pain  Qty: 90 tablet, Refills: 3      Multiple Vitamins-Minerals (CENTRUM SILVER 50+WOMEN PO) Take by mouth      clonazePAM (KLONOPIN) 0.5 MG tablet Take 0.5 tablets by mouth 2 times daily as needed for Anxiety for up to 7 days.   Qty: 6 tablet, Refills: 0    Associated Diagnoses: Anxiety           Current Discharge Medication List      STOP taking these medications       predniSONE (DELTASONE) 10 MG tablet Comments:   Reason for Stopping:               Discharge Exam:    /81   Pulse 80   Temp 97.6 °F (36.4 °C) (Oral)   Resp 17   Ht 5' 5\" (1.651 m)   Wt 236 lb 6.4 oz (107.2 kg)   SpO2 96%   BMI 39.34 kg/m²   General appearance:  NAD  HEENT:   Normal cephalic, atraumatic, moist mucous membranes, no oropharyngeal erythema or exudate  Neck: Supple, trachea midline, no anterior cervical or SC LAD  Heart[de-identified] Normal s1/s2, RRR, no murmurs, gallops, or rubs. No leg edema  Lungs:  No use of accessory musclesNormal respiratory effort. Clear to auscultation, bilaterally without Rales/Wheezes/Rhonchi. Abdomen: Soft, obese, non-tender, non-distended, bowel sounds present, no masses  Musculoskeletal:  No clubbing, no cyanosis, no edema  Skin: No lesion or masses  Neurologic:  Neurovascularly intact without any focal sensory/motor deficits. Cranial nerves: II-XII intact, grossly non-focal.  Psychiatric:  A & O x3  Neuro: Grossly intact, moves all four extremities     Labs: For convenience and continuity at follow-up the following most recent labs are provided:    Lab Results   Component Value Date    WBC 8.5 09/03/2021    HGB 14.3 09/03/2021    HCT 43.2 09/03/2021    MCV 93.5 09/03/2021     09/03/2021     09/07/2021    K 4.2 09/07/2021    K 3.8 09/03/2021     09/07/2021    CO2 28 09/07/2021    BUN 18 09/07/2021    CREATININE 0.7 09/07/2021    CALCIUM 9.6 09/07/2021    ALKPHOS 91 09/03/2021    ALT 29 09/03/2021    AST 22 09/03/2021    BILITOT 0.6 09/03/2021    LABALBU 4.1 09/03/2021    LDLCALC 69 06/03/2021    TRIG 80 06/03/2021     No results found for: INR    Radiology:  Echo Complete    Result Date: 9/7/2021  Transthoracic Echocardiography Report (TTE)  Demographics   [facility]   Date of Study      09/07/2021        Gender              Female   Patient Number     [id number]        Date of Birth       1955   Visit Number       [id number]         Age                 77 year(s)   Accession Number   [id number]        Room Number         4705   Corporate ID       [id number]          Sonographer         Apolonia Palacios, [address] [place] [address]   Ordering Physician Cassidy Albrecht MD Interpreting        Bassam Zimmerman,                                       Physician           , [facility]  Procedure Type of Study   TTE procedure:ECHOCARDIOGRAM COMPLETE 2D W DOPPLER W COLOR.   Procedure Date Date: 09/07/2021 Start: 09:50 AM Study Location: [facility] - Echo Lab Technical Quality: Limited visualization due to body habitus. Indications:Syncope. Patient Status: Routine Height: 65 inches Weight: 236 pounds BSA: 2.12 m2 BMI: 39.27 kg/m2 BP: 118/87 mmHg  Conclusions   Summary  Technically difficult study due to body habitus. Left ventricular cavity size is normal.  Overall, left ventricular systolic function is low normal.  Ejection fraction is visually estimated to be 50-55%. No definitive regional wall motion abnormalities are noted. Diastolic filling parameters suggest grade I diastolic dysfunction. Mild tricuspid regurgitation. RVSP = 24 mmHg. Signature   ------------------------------------------------------------------  Electronically signed by Shantell Fall, [address]  (Interpreting physician) on 09/07/2021 at 11:55 AM  ------------------------------------------------------------------   Findings   Left Ventricle  Left ventricular cavity size is normal.  Overall, left ventricular systolic function is low normal.  Ejection fraction is visually estimated to be 50-55%. No definitive regional wall motion abnormalities are noted. Diastolic filling parameters suggest grade I diastolic dysfunction. E/e' = 8.1. Mitral Valve  There is calcification of the posterior mitral valve leaflet. No evidence of mitral regurgitation. Left Atrium  The left atrium is normal in size. Aortic Valve  The aortic valve is structurally normal.  No evidence of aortic valve regurgitation. Aorta  The aortic root is normal in size. Right Ventricle  The right ventricle is normal in size and function. TAPSE = 2.63 cm. RVS velocity is 10.9 cm/s. RVSP = 24 mmHG. Tricuspid Valve  Tricuspid valve is structurally normal.  Mild tricuspid regurgitation. Right Atrium  The right atrial size is normal.   Pulmonic Valve  The pulmonic valve is not well visualized. No evidence of pulmonic valve  regurgitation.    Pericardial Effusion  No pericardial effusion noted. Pleural Effusion  No pleural effusion. Miscellaneous  IVC size is normal (<2.1cm) and collapses > 50% with respiration consistent  with normal RA pressure (3mmHg). M-Mode/2D Measurements (cm)   LV Diastolic Dimension: 5.2 cm LV Systolic Dimension: 8.30 cm  LV Septum Diastolic: 8.51 cm  LV PW Diastolic: 2.49 cm       AO Root Dimension: 3.3 cm                                 LA Dimension: 3.8 cm                                 LA Area: 12.2 cm2  LVOT: 2.2 cm                   LA volume/Index: 27.5 ml /13 ml/m2  Doppler Measurements   AV Peak Velocity: 123 cm/s     MV Peak E-Wave: 56.1 cm/s  AV Peak Gradient: 6.05 mmHg    MV Peak A-Wave: 88.3 cm/s  AV Mean Gradient: 4 mmHg       MV E/A Ratio: 0.64  LVOT Peak Velocity: 68.5 cm/s  AV Area (Continuity):1.81 cm2   TR Velocity:228 cm/s  TR Gradient:20.79 mmHg  Estimated RAP:3 mmHg  Estimated RVSP: 24 mmHg  E' Septal Velocity: 5.55 cm/s  E' Lateral Velocity: 9.36 cm/s   Aortic Valve   Peak Velocity: 123 cm/s     Mean Velocity: 89.7 cm/s  Peak Gradient: 6.05 mmHg    Mean Gradient: 4 mmHg  Area (continuity): 1.81 cm2  AV VTI: 28.2 cm  Aorta   Aortic Root: 3.3 cm  Ascending Aorta: 3.6 cm  LVOT Diameter: 2.2 cm      XR FOOT LEFT (MIN 3 VIEWS)    Result Date: 8/13/2021  Radiology exam is complete. No Radiologist dictation. Please follow up with ordering provider. CT HEAD WO CONTRAST    Result Date: 9/3/2021  EXAMINATION: CT OF THE HEAD WITHOUT CONTRAST  9/3/2021 3:08 pm TECHNIQUE: CT of the head was performed without the administration of intravenous contrast. Dose modulation, iterative reconstruction, and/or weight based adjustment of the mA/kV was utilized to reduce the radiation dose to as low as reasonably achievable. COMPARISON: None. HISTORY: ORDERING SYSTEM PROVIDED HISTORY: cva TECHNOLOGIST PROVIDED HISTORY: Reason for exam:->cva Has a \"code stroke\" or \"stroke alert\" been called? ->No Decision Support Exception - unselect if not a suspected or confirmed emergency medical condition->Emergency Medical Condition (MA) Reason for Exam: CVA Acuity: Unknown Type of Exam: Unknown FINDINGS: BRAIN/VENTRICLES: There is no acute intracranial hemorrhage, mass effect or midline shift. No abnormal extra-axial fluid collection. The gray-white differentiation is maintained without evidence of an acute infarct. There is no evidence of hydrocephalus. ORBITS: The visualized portion of the orbits demonstrate no acute abnormality. SINUSES: The visualized paranasal sinuses and mastoid air cells demonstrate no acute abnormality. SOFT TISSUES/SKULL:  No acute abnormality of the visualized skull or soft tissues. No acute intracranial abnormality. CTA HEAD NECK W CONTRAST    Result Date: 9/3/2021  EXAMINATION: CTA OF THE HEAD AND NECK WITH CONTRAST 9/3/2021 2:10 pm: TECHNIQUE: CTA of the head and neck was performed with the administration of intravenous contrast. Multiplanar reformatted images are provided for review. MIP images are provided for review. Stenosis of the internal carotid arteries measured using NASCET criteria. Dose modulation, iterative reconstruction, and/or weight based adjustment of the mA/kV was utilized to reduce the radiation dose to as low as reasonably achievable. COMPARISON: Noncontrast CT head from earlier today HISTORY: ORDERING SYSTEM PROVIDED HISTORY: cva TECHNOLOGIST PROVIDED HISTORY: Reason for exam:->cva Decision Support Exception - unselect if not a suspected or confirmed emergency medical condition->Emergency Medical Condition (MA) Reason for Exam: CVA Acuity: Unknown Type of Exam: Unknown FINDINGS: CTA NECK: AORTIC ARCH/ARCH VESSELS: No dissection or arterial injury. There is severe focal stenosis at the origin of the left subclavian artery. No significant stenosis of the brachiocephalic or right subclavian arteries.  CAROTID ARTERIES: No dissection, arterial injury, or hemodynamically significant stenosis by NASCET criteria. VERTEBRAL ARTERIES: No dissection, arterial injury, or significant stenosis. SOFT TISSUES: The lung apices are clear. No cervical or superior mediastinal lymphadenopathy. The larynx and pharynx are unremarkable. No acute abnormality of the salivary and thyroid glands. BONES: No acute osseous abnormality. There are moderate degenerative changes in the cervical spine. CTA HEAD: ANTERIOR CIRCULATION: No significant stenosis of the intracranial internal carotid, anterior cerebral, or middle cerebral arteries. No aneurysm. POSTERIOR CIRCULATION: There are fetal origins of the bilateral PCAs, normal variants. No significant stenosis of the vertebral, basilar, or posterior cerebral arteries. No aneurysm. OTHER: No dural venous sinus thrombosis on this non-dedicated study. BRAIN: No mass effect or midline shift. No extra-axial fluid collection. The gray-white differentiation is maintained. Severe focal stenosis at the origin of the left subclavian artery. Otherwise, no acute abnormality or flow-limiting stenosis in the remainder of the major arteries of the head and neck. NM MYOCARDIAL SPECT REST EXERCISE OR RX    Result Date: 9/7/2021  Cardiac Perfusion Imaging  Demographics   Patient Name       Samantha Martinez   Date of Study      09/07/2021         Gender              Female   Patient Number     [id number]         Date of Birth       1955   Visit Number       [id number]          Age                 77 year(s)   Accession Number   [id number]         Room Number         7943   Corporate ID       [id number]           NM Technician       LNA Marcelo   Nurse              Beverly Wu,  Velia Alfaro RN                 Physician           [facility]   Ordering Physician Cobrin Nobles MD   The procedure was explained in detail to the patient. Risks,  complications and alternative treatments were reviewed.  Written consent  was obtained. Procedure Procedure Type:   Nuclear Stress Test:Pharmacological, NM MYOCARDIAL SPECT REST EXERCISE OR  RX   Study location: [facility] - Nuclear Medicine   Indications: Lightheadedness. Hospital Status: Inpatient. Height: 65 inches Weight: 236 pounds  Risk Factors   The patient risk factors include:obesity, Current/Recent(w/in 1 year)  tobacco use, treated and controlled hypercholesterolemia, treated and  controlled hypertension, family history of premature CAD and dyslipidemia. Conclusions   Summary  Normal LV size and systolic function. Left ventricular ejection fraction of 54%. There is normal isotope uptake at stress and rest.  There is no evidence of myocardial ischemia or scar. Stress Protocols   Resting ECG  Normal sinus rhythm. Nonspecific ST-T wave abnormalities. Resting HR:71 bpm  Resting BP:128/86 mmHg  Stress Protocol:Pharmacologic - Lexiscan's  Peak HR:126 bpm                  HR/BP product:20286  Peak BP:161/87 mmHg  Predicted HR: 153 bpm  % of predicted HR: 82  Test duration: 4 min  Reason for termination:Completed   ECG Findings  Normal response to lexiscan . Arrhythmias  Occasional PVC's. Symptoms  Developed symptoms likely related to Tere Riff. There was stress induced shortness of breath. Denies any chest pain or discomfort. Complications  Procedure complication was none. Stress Interpretation  Appropriate hemodynamic response to lexiscan with  no significant ST changes from baseline. Procedure Medications   - Lexiscan I.V. 0.4 mg.10 sec  Imaging Protocols   - One Day   Rest                          Stress   Isotope:Myoview/Tetrofosmin   Isotope: Myoview/Tetrofosmin  Isotope dose:9.47 mCi         Isotope dose:31.6 mCi  Administration Route:I.V.      Administration Route:I.V.  Date:09/07/2021 07:55         Date:09/07/2021 09:11                                 Technique:      Gated  Imaging Results    Stress ejection    Ejection fraction:54 % EDV :148 ml    ESV :68 ml    Stroke volume :80 ml    LV mass :177 gr  Medical History   Additional Medical History   She went for walk in the mall when she felt really warm and felt  dizzy. She had to sit down holding her head in her hand for  several minutes. Store staff gave her some water and pop escobar  and called EMS. The pt takes her BP meds in the morning. Reports  occasional sBP readings as low as 101. Has had couple of similar  episode in the past 6 months. Denies fall, LOC. Denies fever,  chills, leg edema, chest pain, palpitations, abdominal pain  /n/v/dc. No focal neurological deficit  ED work up showed EKG NSR Troponin < 0.01. CT head is neg for  hemorrhage or ischemic changes. CTA head and neck showed severe  stonosis at the origin of subclavian artery. Signatures   ------------------------------------------------------------------  Electronically signed by Salvador Hagen, [facility]  (Interpreting physician) on 09/07/2021 at 11:50  ------------------------------------------------------------------      The patient was seen and examined on day of discharge and this discharge summary is in conjunction with any daily progress note from day of discharge. Time Spent on discharge is 45 minutes  in the examination, evaluation, counseling and review of medications and discharge plan. Note that more than 30 minutes was spent in preparing discharge papers, discussing discharge with patient, medication review, etc.       Signed:    Luz Canales MD   9/7/2021      Thank you Omar Caceres MD for the opportunity to be involved in this patient's care.  If you have any questions or concerns please feel free to contact me at [address] [place] [address]

## 2021-09-07 NOTE — PROGRESS NOTES
[facility]   Cardiology Progress Note     Date: 9/7/2021  Admit Date: 9/3/2021       Chief Complaint:   Chief Complaint   Patient presents with    Dizziness     pt brought in by [facility] alpa with c/o dizziness since a little after 11. pt states she's had 3 episodes in the past 6 months but this has been the most intense. History of Present Illness: History obtained from patient and medical record. Isabel Wu is a 77 y.o. female with a past medical history of htn, hyperlipidemia, smoking, and subclavian stenosis admitted after have a presyncopal spell characterized by diaphoresis, dizziness, and nausea which persisted for hours. Her BP in the ER was 148/95. She denies any chest pain or previous hx of CAD. Her EKG is abnormal with  Anterior ischemia on 2 tracings 2 days apart with neg trops. There are no old EKGs for comparison. She has never had any cardiac testing. She has been seen by Dr Lorraine Mcintyre on this admit for a 75% stenosis of the left subclavian which she has apparently had for at least 2 years. She has had 2 other presyncopal spells which were similar to this one months ago. A chest CT of 2019 appears to show coronary calcifications (not in the report, but neither was the subclavian stenosis)    Interval Hx: Today, she is feeling better. No complaints of CP or SOB. Since losartan dose has been decreased she has not had any positional dizziness with quick position change. Pt eager to discharge home. Patient seen and examined. Clinical notes reviewed. Telemetry reviewed. No new complaints today. No major events overnight. Denies having chest pain, palpitations, shortness of breath, orthopnea/PND, cough, or dizziness at the time of this visit.       Past Medical History:  Past Medical History:   Diagnosis Date    History of lung cancer     Hyperlipidemia     Hypertension         Past Surgical History:    has a past surgical history that includes Lung surgery (2011) and back surgery (2014). Social History:  Reviewed. reports that she has been smoking cigarettes. She started smoking about 50 years ago. She has a 25.00 pack-year smoking history. She has never used smokeless tobacco. She reports previous alcohol use. She reports that she does not use drugs. Allergies: Allergies   Allergen Reactions    Codeine Other (See Comments)     Wasn't able to speak or move       Family History:  Reviewed. family history includes COPD in her sister; Cancer in her mother; Diabetes in her maternal grandfather; High Cholesterol in her mother and sister; No Known Problems in her brother, brother, and father. Denies family history of sudden cardiac death, arrhythmia, premature CAD    Home Meds:  Prior to Visit Medications    Medication Sig Taking? Authorizing Provider   venlafaxine (EFFEXOR XR) 37.5 MG extended release capsule Take 1 capsule by mouth daily Yes Geoffrey Wood MD   atorvastatin (LIPITOR) 20 MG tablet TAKE 1 TABLET BY MOUTH DAILY AT BEDTIME Yes Geoffrey Wood MD   losartan (COZAAR) 100 MG tablet TAKE 1 TABLET BY MOUTH EVERY DAY Yes Geoffrey Wood MD   meloxicam (MOBIC) 15 MG tablet Take 1 tablet by mouth daily as needed for Pain Yes Geoffrey Wood MD   Multiple Vitamins-Minerals (CENTRUM SILVER 50+WOMEN PO) Take by mouth Yes Historical Provider, MD   clonazePAM (KLONOPIN) 0.5 MG tablet Take 0.5 tablets by mouth 2 times daily as needed for Anxiety for up to 7 days.   Geoffrey Wood MD        Scheduled Meds:   losartan  50 mg Oral Daily    venlafaxine  37.5 mg Oral Daily    sodium chloride flush  5-40 mL IntraVENous 2 times per day    enoxaparin  40 mg SubCUTAneous Daily    atorvastatin  40 mg Oral Daily     Continuous Infusions:   sodium chloride       PRN Meds:melatonin, lip balm petroleum free, clonazePAM, sodium chloride flush, sodium chloride, ondansetron **OR** ondansetron, polyethylene glycol, acetaminophen **OR** acetaminophen     Review of Systems:  · Constitutional: Negative for fever, night sweats, chills,  · Skin: Negative for rash, pruritus, bleeding, or bruising    · HEENT: Negative for vision changes, ringing in the ears, dysphagia  · Respiratory: Reviewed in HPI  · Cardiovascular: Reviewed in HPI  · Gastrointestinal: Negative for abdominal pain, N/V/D, constipation, or black/tarry stools  · Genito-Urinary: Negative for dysuria, incontinence, or hematuria  · Musculoskeletal: Negative for joint swelling, muscle pain, or injuries  · Neurological/Psych: Negative for confusion, seizures, headaches, or TIA-like symptoms. No anxiety or depression. Physical Examination:  Vitals:    09/07/21 0510   BP: 118/87   Pulse: 72   Resp: 16   Temp: 97.8 °F (36.6 °C)   SpO2: 97%      In: 10 [I.V.:10]  Out: -    Wt Readings from Last 3 Encounters:   09/04/21 236 lb 6.4 oz (107.2 kg)   08/13/21 236 lb 9.6 oz (107.3 kg)   07/16/21 235 lb (106.6 kg)       Intake/Output Summary (Last 24 hours) at 9/7/2021 6089  Last data filed at 9/6/2021 2144  Gross per 24 hour   Intake 10 ml   Output --   Net 10 ml       Telemetry: Personally Reviewed  - Sinus rhythm   · Constitutional: Cooperative and in no apparent distress, and appears overweight   · Skin: Warm and pink; no pallor, cyanosis, clubbing, or bruising   · HEENT: Symmetric and normocephalic. PERRL. Conjunctiva pink with clear sclera. Mucus membranes moist.   · Cardiovascular: Regular rate and rhythm. S1/S2 present without murmurs, no rubs or gallops. Peripheral pulses 2+, capillary refill < 3 seconds. No elevation of JVP. No peripheral edema  · Respiratory: Respirations symmetric and unlabored. Lungs clear to auscultation bilaterally, no wheezing, crackles, or rhonchi  · Gastrointestinal: Abdomen soft and round. Bowel sounds active without tenderness. · Musculoskeletal: Bilateral upper and lower extremity strength 5/5 with full ROM  · Neurologic/Psych: Awake and orientated to person, place and time.  Calm affect, Follow up in office in a few weeks. Multiple medical conditions with risk of decompensation. All pertinent information and plan of care discussed with the physician. All questions and concerns were addressed to the patient. Alternatives to my treatment were discussed. I have discussed the above stated plan with patient and spouse and the nurse. The patient and spouse verbalized understanding and agreed with the plan. Thank you for allowing to us to participate in the care of Marko Manley.    Total visit time > 35 minutes; > 50% spend counseling / coordinating care. I reviewed interval history, physical exam, review of data including labs, imaging, development and implementation of treatment plan and coordination of complex care. Counseled on risk factor modifications. Bronwyn HIRSCH-CNP  [facility]   Office: [phone]    NOTE:  This report was transcribed using voice recognition software. Every effort was made to ensure accuracy; however, inadvertent computerized transcription errors may be present.

## 2021-09-07 NOTE — PROGRESS NOTES
Instructed on Lexiscan Stress Test Procedure including possible side effects/ adverse reactions. Patient verbalizes  understanding and denies having any questions . See [address] [place] [address] Cardiology

## 2021-09-14 ENCOUNTER — TELEPHONE (OUTPATIENT)
Dept: FAMILY MEDICINE CLINIC | Age: 66
End: 2021-09-14

## 2021-09-14 NOTE — TELEPHONE ENCOUNTER
----- Message from Dittit Bi sent at 9/14/2021  9:38 AM EDT -----  Subject: Message to Provider    QUESTIONS  Information for Provider? pt was released from the [facility] on 9/7 and just wants her pcp to look at her chart to see if she   would need to come in for a follow up.   ---------------------------------------------------------------------------  --------------  [address]  What is the best way for the office to contact you? OK to leave message on   voicemail, OK to respond with electronic message via [employer] portal (only   for patients who have registered [employer] account)  Preferred Call Back Phone Number? [phone]  ---------------------------------------------------------------------------  --------------  SCRIPT ANSWERS  Relationship to Patient?  Self

## 2021-09-16 PROCEDURE — 93244 EXT ECG>48HR<7D REV&INTERPJ: CPT | Performed by: INTERNAL MEDICINE

## 2021-09-16 NOTE — ADDENDUM NOTE
[address] [place] [address]  Phone: [phone]           Patient Discharge Instructions   Our goal is to set you up for success. This packet includes information on your condition, medications, and your home care.  It will help you care for yourself to prevent having to return to the hospital.     Please ask your nurse if you have any questions.      There are many details to remember when preparing to leave the hospital. Here is what you will need to do:    1. Take your medicine. If you are prescribed medications, review your Medication List on the following pages. You may have new medications to  at the pharmacy and others that you'll need to stop taking. Review the instructions for how and when to take your medications. Talk with your doctor or nurses if you are unsure of what to do.     2. Go to your follow-up appointments. Specific follow-up information is listed in the following pages. Your may be contacted by a nurse or clinical provider about future appointments. Be sure we have all of the phone numbers to reach you. Please contact your provider's office if you are unable to make an appointment.     3. Watch for warning signs. Your doctor or nurse will give you detailed warning signs to watch for and when to call for assistance. These instructions may also include educational information about your condition. If you experience any of warning signs to your health, call your doctor.           [facility]  Unless otherwise directed by your provider, please   contact [facility], our nurse care line   that is available for 24/7 assistance.     [phone] (toll-free)     Registered nurses in the [facility]   provide: appointment scheduling, clinical advisement, health education, and other advisory services.                  ** Verify the list of medication(s) below is accurate and up to date. Carry this with you in case of  Addended by: Hammad Thorpe on: 9/16/2021 02:53 PM     Modules accepted: Orders emergency. If your medications have changed, please notify your healthcare provider.             Medication List      START taking these medications        Additional Info                      ciprofloxacin HCl 250 MG tablet   Commonly known as:  CIPRO   Quantity:  14 tablet   Refills:  0   Dose:  250 mg    Instructions:  Take 1 tablet (250 mg total) by mouth 2 (two) times daily.     Begin Date    AM    Noon    PM    Bedtime       hydrocodone-acetaminophen 10-325mg  mg Tab   Commonly known as:  NORCO   Quantity:  20 tablet   Refills:  0   Dose:  1 tablet    Instructions:  Take 1 tablet by mouth every 6 (six) hours as needed for Pain.     Begin Date    AM    Noon    PM    Bedtime         CHANGE how you take these medications        Additional Info                      tolterodine 4 MG 24 hr capsule   Commonly known as:  DETROL LA   Quantity:  30 capsule   Refills:  11   Dose:  4 mg   What changed:  when to take this    Instructions:  Take 1 capsule (4 mg total) by mouth once daily.     Begin Date    AM    Noon    PM    Bedtime         STOP taking these medications     ferrous sulfate 325 (65 FE) MG EC tablet            Where to Get Your Medications      You can get these medications from any pharmacy     Bring a paper prescription for each of these medications     ciprofloxacin HCl 250 MG tablet    hydrocodone-acetaminophen 10-325mg  mg Tab                  Please bring to all follow up appointments:    1. A copy of your discharge instructions.  2. All medicines you are currently taking in their original bottles.  3. Identification and insurance card.    Please arrive 15 minutes ahead of scheduled appointment time.    Please call 24 hours in advance if you must reschedule your appointment and/or time.        Your Scheduled Appointments     May 29, 2017 10:00 AM CDT   Pre-Admit Testing Visit with PRE-ADMIT, [facility] ([facility])    [address]  [facility] [address] [phone]              Your Future Surgeries/Procedures     Jun 07, 2017   Surgery with Turner Bocanegra MD   [facility] ([facility])    [address]  [facility] [address] [phone]              Follow-up Information     Follow up with Harsha Sheppard MD On 5/22/2017.    Specialty:  Internal Medicine    Why:  @2:45pm     Contact information:    [address] [place] [address] [address] Ellis [address]eferino  [facility] [address]  [phone]          Follow up with Turner Bocanegra MD.    Specialty:  Urology    Why:  as per routine    Contact information:    [address]  [address]  [facility] [address] [phone]          Discharge Instructions     Future Orders    Call MD for:     Comments:    For worsening symptoms, chest pain, shortness of breath, increased abdominal pain, high grade fever, stroke or stroke like symptoms, immediately go to the nearest Emergency Room or call 911 as soon as possible.    Diet general     Comments:    Cardiac/ 2 gram sodium low cholesterol diet    Questions:    Total calories:      Fat restriction, if any:      Protein restriction, if any:      Na restriction, if any:      Fluid restriction:      Additional restrictions:      Other restrictions (specify):     Comments:    Fall precautions        Primary Diagnosis     Your primary diagnosis was:  Urinary Tract Infection      Admission Information     Date & Time Provider Department CSN    5/14/2017  9:45 PM Cathy Nicholson MD [facility] [id number]      Care Providers     Provider Role Specialty Primary office phone    Cathy Nicholson MD Attending Provider Internal Medicine [phone]    Dina Greco MD Consulting Physician  Urology [phone]      Your Vitals Were     BP Pulse Temp Resp Height Weight    104/69 122 99.1 °F (37.3 °C) (Oral) 18 5' 6" (1.676 m) 74.8 kg (165 lb)    Last Period SpO2 BMI          (LMP Unknown) 100% 26.63 kg/m2        Recent Lab " Values        12/12/2016                          10:54 PM           A1C 5.4           Comment for A1C at 10:54 PM on 12/12/2016:  According to ADA guidelines, hemoglobin A1C <7.0% represents  optimal control in non-pregnant diabetic patients.  Different  metrics may apply to specific populations.   Standards of Medical Care in Diabetes - 2016.  For the purpose of screening for the presence of diabetes:  <5.7%     Consistent with the absence of diabetes  5.7-6.4%  Consistent with increasing risk for diabetes   (prediabetes)  >or=6.5%  Consistent with diabetes  Currently no consensus exists for use of hemoglobin A1C  for diagnosis of diabetes for children.        Allergies as of 5/17/2017        Reactions    Pcn [Penicillins] Anaphylaxis      Advance Directives     An advance directive is a document which, in the event you are no longer able to make decisions for yourself, tells your healthcare team what kind of treatment you do or do not want to receive, or who you would like to make those decisions for you.  If you do not currently have an advance directive, [facility] encourages you to create one.  For more information call:  [phone]), [phone] ([phone]),  or log on to [email or web address].        Language Assistance Services     ATTENTION: Language assistance services are available, free of charge. Please call [phone].      ATENCIÓN: Si habla español, tiene a batista disposición servicios gratuitos de asistencia lingüística. Llame al [phone].     CHÚ Ý: N?u b?n nói Ti?ng Vi?t, có các d?ch v? h? tr? ngôn ng? mi?n phí dành cho b?n. G?i s? [phone].        Chronic Kindey Disease Education              [facility] complies with applicable Federal civil rights laws and does not discriminate on the basis of race, color, national origin, age, disability, or sex.

## 2021-09-28 ENCOUNTER — OFFICE VISIT (OUTPATIENT)
Dept: CARDIOLOGY CLINIC | Age: 66
End: 2021-09-28
Payer: MEDICARE

## 2021-09-28 VITALS
HEART RATE: 80 BPM | WEIGHT: 238 LBS | HEIGHT: 65 IN | BODY MASS INDEX: 39.65 KG/M2 | DIASTOLIC BLOOD PRESSURE: 84 MMHG | SYSTOLIC BLOOD PRESSURE: 148 MMHG | OXYGEN SATURATION: 97 %

## 2021-09-28 DIAGNOSIS — R55 SYNCOPE, UNSPECIFIED SYNCOPE TYPE: Primary | ICD-10-CM

## 2021-09-28 DIAGNOSIS — E78.2 MIXED HYPERLIPIDEMIA: ICD-10-CM

## 2021-09-28 DIAGNOSIS — Z72.0 TOBACCO ABUSE: ICD-10-CM

## 2021-09-28 DIAGNOSIS — I10 ESSENTIAL HYPERTENSION: ICD-10-CM

## 2021-09-28 DIAGNOSIS — I77.1 STENOSIS OF LEFT SUBCLAVIAN ARTERY (HCC): ICD-10-CM

## 2021-09-28 PROCEDURE — 99214 OFFICE O/P EST MOD 30 MIN: CPT | Performed by: NURSE PRACTITIONER

## 2021-09-28 PROCEDURE — G8417 CALC BMI ABV UP PARAM F/U: HCPCS | Performed by: NURSE PRACTITIONER

## 2021-09-28 PROCEDURE — 4040F PNEUMOC VAC/ADMIN/RCVD: CPT | Performed by: NURSE PRACTITIONER

## 2021-09-28 PROCEDURE — G8427 DOCREV CUR MEDS BY ELIG CLIN: HCPCS | Performed by: NURSE PRACTITIONER

## 2021-09-28 PROCEDURE — 1090F PRES/ABSN URINE INCON ASSESS: CPT | Performed by: NURSE PRACTITIONER

## 2021-09-28 PROCEDURE — 3017F COLORECTAL CA SCREEN DOC REV: CPT | Performed by: NURSE PRACTITIONER

## 2021-09-28 PROCEDURE — G8399 PT W/DXA RESULTS DOCUMENT: HCPCS | Performed by: NURSE PRACTITIONER

## 2021-09-28 PROCEDURE — 1123F ACP DISCUSS/DSCN MKR DOCD: CPT | Performed by: NURSE PRACTITIONER

## 2021-09-28 PROCEDURE — 4004F PT TOBACCO SCREEN RCVD TLK: CPT | Performed by: NURSE PRACTITIONER

## 2021-09-28 PROCEDURE — 1111F DSCHRG MED/CURRENT MED MERGE: CPT | Performed by: NURSE PRACTITIONER

## 2021-09-28 NOTE — PATIENT INSTRUCTIONS
Follow up with Dr. Marylen Furnace    Call me in one month and let me know symptoms. I would like to start you on a beta blocker for the fast heart rates seen on heart monitor.

## 2021-09-28 NOTE — PROGRESS NOTES
[address]     Outpatient Follow Up Note    CHIEF COMPLAINT / HPI: Hospital Follow Up secondary to dizziness    Hospital record has been reviewed  Hospital Course progressed as follows per discharge summary:     9/3/21-9/7/21  Hospital course:  76 yo F with history of HTN, hyperlipidemia, obesity, h/o adenocarcinoma lung s/p ALLAN lobectomy, tobacco abuse came to ER with syncope. Admitted as inpatient for further management. Followed by Cardio and Vascular. Vascular noted:   \"States she has had several episodes of this over the last 6 months or so.  Work-up here revealed a left subclavian stenosis and as a result vascular surgery has been consulted. Do Sheehan was also noted that patient had a significant left subclavian stenosis on CT scan of the chest in 2019 however was not commented in the radiology report.  She does not relate any significant history of left arm claudication.  Patient does continue to smoke 1/2 pack/day. Bula Dayhoff Bula Dayhoff It is unlikely her left subclavian stenosis is contributing to her episodes of dizziness. Corey Nance has been evidence of this left subclavian stenosis for several years. Do Sheehan is likely related to her underlying tobacco abuse. Bang Smith has no significant left arm claudication and the symptoms were reviewed with her in detail today.  I will plan to see her again in my office in 4 weeks to continue to monitor.  If she develops any significant left arm claudication or vertebrobasilar symptoms would then consider possible left subclavian stent.  This was discussed with her in detail. \"     SPECT stress negative for reversible ischemia on 9/7/21. Ordered MRI Brain and UA; patient wanted to cancel and go home. PCP can consider these in follow up. Seen by Cardio. Lipitor increased to 40 mg daily and Losartan decreased to 50 mg daily. 1 week Holter ordered by Cardio. F/U with PCP, Cardio and Vascular.      Iveth Mueller is 77 y.o. female who presents today for a routine follow up after a recent hospitalization related to the above mentioned issues. Subjective:   Since the time of discharge, the patient states dizziness has greatly improved on lowered BP medications and she is able to weed again. Denies chest pain, palpitations, or edema. Continues to have some exertional SOB which she attributes to weight gain. She will be living in [place] for 6 months and plans on swimming/walking in the pool for exercise down there. Home blood pressures run 110s/70s. Continues to smoke 0.5 PPD. Hx of spinal stenosis surgery. With regard to medication therapy the patient has been compliant with prescribed regimen. They have tolerated therapy to date. Past Medical History:   Diagnosis Date    History of lung cancer     Hyperlipidemia     Hypertension      Social History:    Social History     Tobacco Use   Smoking Status Current Every Day Smoker    Packs/day: 0.50    Years: 50.00    Pack years: 25.00    Types: Cigarettes    Start date: 12/7/1970   Smokeless Tobacco Never Used   Tobacco Comment    2 packs a week     Current Medications:  Current Outpatient Medications   Medication Sig Dispense Refill    atorvastatin (LIPITOR) 40 MG tablet Take 1 tablet by mouth daily 30 tablet 0    losartan (COZAAR) 50 MG tablet Take 1 tablet by mouth daily 30 tablet 0    venlafaxine (EFFEXOR XR) 37.5 MG extended release capsule Take 1 capsule by mouth daily 90 capsule 3    meloxicam (MOBIC) 15 MG tablet Take 1 tablet by mouth daily as needed for Pain 90 tablet 3    Multiple Vitamins-Minerals (CENTRUM SILVER 50+WOMEN PO) Take by mouth      clonazePAM (KLONOPIN) 0.5 MG tablet Take 0.5 tablets by mouth 2 times daily as needed for Anxiety for up to 7 days. 6 tablet 0     No current facility-administered medications for this visit. REVIEW OF SYSTEMS:   CONSTITUTIONAL: No major weight gain or loss, fatigue, weakness, night sweats or fever.  There's been no change in energy level, sleep pattern, or activity level. HEENT: No new vision difficulties or ringing in the ears. RESPIRATORY: No new SOB, PND, orthopnea or cough. CARDIOVASCULAR: See HPI  GI: No nausea, vomiting, diarrhea, constipation, abdominal pain or changes in bowel habits. : No urinary frequency, urgency, incontinence hematuria or dysuria. SKIN: No cyanosis or skin lesions. MUSCULOSKELETAL: No new muscle or joint pain. NEUROLOGICAL: No syncope or TIA-like symptoms. PSYCHIATRIC: No anxiety, pain, insomnia or depression    Objective:   PHYSICAL EXAM:        VITALS:  BP (!) 148/84 (Site: Right Upper Arm, Position: Sitting, Cuff Size: Large Adult)   Pulse 80   Ht 5' 5\" (1.651 m)   Wt 238 lb (108 kg)   SpO2 97%   BMI 39.61 kg/m²     CONSTITUTIONAL: Cooperative, no apparent distress, and appears well nourished / developed overnight  NEUROLOGIC:  Awake and orientated to person, place and time. PSYCH: Calm affect. SKIN: Warm and dry. HEENT: Sclera non-icteric, normocephalic, neck supple, no elevation of JVP, normal carotid pulses with no bruits and thyroid normal size. LUNGS:  No increased work of breathing and clear to auscultation, no crackles or wheezing. CARDIOVASCULAR:  Regular rate and rhythm with no murmurs, gallops, rubs, or abnormal heart sounds, normal PMI. The apical impulses not displaced. Heart tones are crisp and normal                                              The carotid upstroke is normal in amplitude and contour without delay or bruit    ABDOMEN:  Normal bowel sounds, non-distended and non-tender to palpation   EXT: No edema, no calf tenderness. Pulses are present bilaterally.     DATA:    Lab Results   Component Value Date    ALT 29 09/03/2021    AST 22 09/03/2021    ALKPHOS 91 09/03/2021    BILITOT 0.6 09/03/2021     Lab Results   Component Value Date    CREATININE 0.7 09/07/2021    BUN 18 09/07/2021     09/07/2021    K 4.2 09/07/2021     09/07/2021    CO2 28 09/07/2021     Lab Results Component Value Date    TSH 0.64 07/30/2018     Lab Results   Component Value Date    WBC 8.5 09/03/2021    HGB 14.3 09/03/2021    HCT 43.2 09/03/2021    MCV 93.5 09/03/2021     09/03/2021     No components found for: CHLPL  Lab Results   Component Value Date    TRIG 80 06/03/2021    TRIG 81 08/21/2020    TRIG 99 07/31/2019     Lab Results   Component Value Date    HDL 74 (H) 06/03/2021    HDL 75 (H) 08/21/2020    HDL 78 (H) 07/31/2019     Lab Results   Component Value Date    LDLCALC 69 06/03/2021    LDLCALC 69 08/21/2020    LDLCALC 72 07/31/2019     Lab Results   Component Value Date    LABVLDL 16 06/03/2021    LABVLDL 16 08/21/2020    LABVLDL 20 07/31/2019     Radiology Review:  Pertinent images / reports were reviewed as a part of this visit and reveals the following:    Last Echo 9/7/21:  Summary   Technically difficult study due to body habitus. Left ventricular cavity size is normal.   Overall, left ventricular systolic function is low normal.   Ejection fraction is visually estimated to be 50-55%. No definitive regional wall motion abnormalities are noted. Diastolic filling parameters suggest grade I diastolic dysfunction. Mild tricuspid regurgitation. RVSP = 24 mmHg. Last Stress Test 9/7/21:  Summary    Normal LV size and systolic function.    Left ventricular ejection fraction of 54%.    There is normal isotope uptake at stress and rest.   Noreene Shouts is no evidence of myocardial ischemia or scar.         CTA head/neck 9/3/21:  Severe focal stenosis at the origin of the left subclavian artery.       Otherwise, no acute abnormality or flow-limiting stenosis in the remainder of   the major arteries of the head and neck. Holter Monitor 9/16/21:  Predominantly SR, PAT noted     Last ECG 9/6/21:  Normal sinus rhythm  Nonspecific T wave abnormality    Assessment:      Diagnosis Orders   1.  Syncope, unspecified syncope type   ~ MCOT9/16/21 SR, PAT noted   ~ Refusing BB or verapamil as dizziness just improved and she is denying palpitations    2. Stenosis of left subclavian artery (HCC)   ~ not thought to be contributing to syncope/dizziness as it has been noted on scans for years  ~ denies claudication / vertebrobasilar symtpoms  ~ f/u with Dr. Marylen Furnace  ~ statin     3. Essential hypertension   ~ higher in office; 148/84 and 136/78 on recheck  ~ home blood pressures running 110s/70s (cuff has been verified)  ~ losartan 50     4. Mixed hyperlipidemia   ~ atorvastatin 40  ~ lipids 6/3/21  HDL 74 LDL 69 Trig 80    5. Tobacco abuse   ~ smoking 0.5 PPD  ~ encouraged smoking cessation         Patient is stable since hospital discharge. Plan:   1. Patient does not want to start any new medications (BB or verapamil for AT) as dizziness just improved with lowered dose of losartan. She is to call me in a month, before leaving for [place], and let me know how she feels (ie does she have any palpitations). 2. Schedule follow up with Dr. Marylen Furnace  3. RTO in 6 months     I have addressed the patient's cardiac risk factors and adjusted pharmacologic treatment as needed. In addition, I have reinforced the need for patient directed risk factor modification. Further evaluation will be based upon the patient's clinical course and testing results. All questions and concerns were addressed to the patient    The patient  currently is smoking. The risks related to smoking were reviewed with the patient. Products available for smoking cessation were discussed. Dual Antiplatelet therapy / anti-coagulation has not been recommended / prescribed for this patient.  .    Pt is not on a BB; no MI  Pt is on an ace-i/ARB  Pt is on a statin      Saturated fat diet discussed  Exercise program discussed    Thank you for allowing to us to participate in the care of Rogelio Plummerricia  Documentation of today's visit sent to PCP

## 2021-10-14 RX ORDER — LOSARTAN POTASSIUM 50 MG/1
50 TABLET ORAL DAILY
Qty: 30 TABLET | Refills: 11 | Status: SHIPPED | OUTPATIENT
Start: 2021-10-14 | End: 2022-09-26 | Stop reason: SDUPTHER

## 2021-10-14 RX ORDER — ATORVASTATIN CALCIUM 40 MG/1
40 TABLET, FILM COATED ORAL DAILY
Qty: 30 TABLET | Refills: 11 | Status: SHIPPED | OUTPATIENT
Start: 2021-10-14 | End: 2022-09-26 | Stop reason: SDUPTHER

## 2021-10-14 NOTE — TELEPHONE ENCOUNTER
atorvastatin (LIPITOR) 40 MG tablet [[id number]]     losartan (COZAAR) 50 MG tablet [[id number]]     [facility] [address] [place] [id number]   Phone:  [phone]  Fax:  [phone]    Patient wants to know if she could get 3 month supply instead of 1 month supply for these medications

## 2021-10-14 NOTE — TELEPHONE ENCOUNTER
Medication:   Requested Prescriptions     Pending Prescriptions Disp Refills    atorvastatin (LIPITOR) 40 MG tablet 30 tablet 0     Sig: Take 1 tablet by mouth daily    losartan (COZAAR) 50 MG tablet 30 tablet 0     Sig: Take 1 tablet by mouth daily       Patient Phone Number: [phone] (home)     Last appt: 8/23/2021   Next appt: Visit date not found    Last OARRS: No flowsheet data found.   PDMP Monitoring:    Last PDMP Lamar Harris as Reviewed [facility]):  Review User Review Instant Review Result   Meme Camejo 1874 5/4/2021 10:58 AM Reviewed PDMP [1]     Preferred Pharmacy:   [facility] [address] [address], [address] [phone]  F [phone]  [address] [facility]  [address] [address]  Phone: [phone] Fax: [phone]    [facility] [address], [address] [facility] [phone] [facility] [phone]  [address] [address]  Phone: [phone] Fax: Pamela [phone], [address] [phone] Jeneane Laws [phone]  [facility] [place] [address]  Phone: [phone] Fax: [phone]

## 2022-05-04 SDOH — HEALTH STABILITY: PHYSICAL HEALTH: ON AVERAGE, HOW MANY MINUTES DO YOU ENGAGE IN EXERCISE AT THIS LEVEL?: 60 MIN

## 2022-05-04 SDOH — HEALTH STABILITY: PHYSICAL HEALTH: ON AVERAGE, HOW MANY DAYS PER WEEK DO YOU ENGAGE IN MODERATE TO STRENUOUS EXERCISE (LIKE A BRISK WALK)?: 3 DAYS

## 2022-05-04 ASSESSMENT — LIFESTYLE VARIABLES
HOW MANY STANDARD DRINKS CONTAINING ALCOHOL DO YOU HAVE ON A TYPICAL DAY: 98
HOW OFTEN DO YOU HAVE SIX OR MORE DRINKS ON ONE OCCASION: 1
HOW MANY STANDARD DRINKS CONTAINING ALCOHOL DO YOU HAVE ON A TYPICAL DAY: PATIENT DECLINED
HOW OFTEN DO YOU HAVE A DRINK CONTAINING ALCOHOL: 1
HOW OFTEN DO YOU HAVE A DRINK CONTAINING ALCOHOL: NEVER

## 2022-05-04 ASSESSMENT — PATIENT HEALTH QUESTIONNAIRE - PHQ9
SUM OF ALL RESPONSES TO PHQ QUESTIONS 1-9: 0
2. FEELING DOWN, DEPRESSED OR HOPELESS: 0
1. LITTLE INTEREST OR PLEASURE IN DOING THINGS: 0
SUM OF ALL RESPONSES TO PHQ QUESTIONS 1-9: 0
SUM OF ALL RESPONSES TO PHQ9 QUESTIONS 1 & 2: 0

## 2022-05-06 NOTE — PROGRESS NOTES
[address] [facility] VISIT    Patient is here for their Medicare Annual Wellness Visit. Also f/u HTN, sugar and has several other c/o. Last eye exam: 2 weeks ago  Last dental exam: has upcoming appointment in next couple of weeks, and goes twice a year  Exercise: pool 3-4 times a week for a hour when in [place] and then housework and light yard work  Diet: in general, a \"healthy\" diet  , on average, 2 meals per day    How would you rate your overall health? : Fair        Fall Risk 5/4/2022 5/4/2021 8/18/2020 7/17/2019   2 or more falls in past year? no no no yes   Fall with injury in past year? no no no no       PHQ Scores 5/4/2022 8/23/2021 5/4/2021 8/18/2020 7/17/2019 7/26/2018 11/8/2017   PHQ2 Score 0 0 0 0 0 1 2   PHQ9 Score 0 0 0 0 0 1 2       Do you always wear a seat belt in the car?: Yes      Have you noted any problems with hearing?: No  Have you noted any vision problems?: Yes, has cataracts, seeing specialist, seeing them in June and will discuss surgery. Do you have concerns about your sexual health?: no  In the past month how much has pain been an issue for you?:  Quite a bit - from her back surgery, walking is an issue, bending, reaching, etc.   In the past month have you had issues with anxiety, loneliness, irritability or fatigue:  Somewhat    Do you take opioid medications even sometimes? No     Living Will: Yes,   Copy requested      Healthcare Decision Maker:    Primary Decision Maker: Marisela Caruso - [phone]    Secondary Decision Maker: Gaston Osuna - Child  Click here to complete Healthcare Decision Makers including selection of the Healthcare Decision Maker Relationship (ie \"Primary\"). Today we documented Decision Maker(s) consistent with Legal Next of Kin hierarchy. Who lives at home with you:   Do you have any services coming to your home (meals on wheels, home health, etc) ? : no      Do you need help with:  Using the phone:  No  Bathing: No  Dressing:  No  Toileting: No  Transportation:  No - doesn't like to drive by herself. Shopping: yes bc back pain, has to use a cart to hold onto,   Preparing meals: no - but has to take her time  Housework/Laundry: takes her longer but does her own house work  Medications: No  Money management: No    Does your home have:  Unsecured throw rugs: few that are well secured  Grab bars in bathroom: no - discussed getting  Walk in shower: Yes  Seat in shower: No  Lit pathways for night (nightlights): Yes    Memory:  Have you or anyone close to you expressed concerns about your memory: No    Knows:  Month: Yes  Day: Yes  Year: Yes  Day of Week: Yes  Able to Recall (tree, fork, ball) : Yes    Patient history:   Patient's medications, allergies, past medical, surgical, social and family histories were reviewed and updated in the EHR under History. Social History     Substance and Sexual Activity   Alcohol Use Not Currently    Comment: stopped in 2019, felt drinking too much       Social History     Substance and Sexual Activity   Drug Use No       Social History     Tobacco Use   Smoking Status Current Every Day Smoker    Packs/day: 0.50    Years: 50.00    Pack years: 25.00    Types: Cigarettes    Start date: 12/7/1970   Smokeless Tobacco Never Used   Tobacco Comment    2 packs a week           Care Team:  Patient's list of care team members was updated in EHR under the Snap Shot. Immunizations: Reviewed with patient. Will send me booster covid record    Health Maintenance Due   Topic Date Due    Shingles vaccine (2 of 3) 02/26/2015    COVID-19 Vaccine (3 - Booster for Moderna series) 08/26/2021    A1C test (Diabetic or Prediabetic)  06/03/2022    Lipids  06/03/2022    DEXA (modify frequency per FRAX score)  06/03/2022       CHRONIC CONDITIONS / ACUTE COMPLAINTS      Henrietta Tong is a 74 y.o. female who presents for follow-up of HTN.     Checks BP at home: Yes - not that often  BP numbers: 106/78, usually runs 120's  Taking medication daily: Yes (losartan)  Side Effects of medication: no    Chol - taking lipitor daily, no SE    Anxiety - Taking effexor daily for anxiety, well controlled, no SE    Weight - started working on weight loss in March, cut out snacks, more F&V, lots of salads. Left shoulder pain with symptoms going on for the past 3 months. She also has had a lot of dizziness for a very long time. Got better when BP medication was cut down, but still getting off balance and lightheadedness. States the other day was walking into the [place], felt ok when got out of car,  dropped her off and states felt ok when got out of car but within 30 seconds states felt dizzy, feels like on tilt, has to hang onto something. States had to sit and felt nauseous. States had to sit aobut 5-10 min but didn't feel great rest of day. States feels dizzy everyday, Edilia Kauffman feels wobbly. \"  Gets \"real dizzy\" every several months. States the dizziness keeps her from doing things as afraid of falling. Has had chronic back pain since back surgery many years ago, keeps her from walking distance, doing things she wants to do etc.       Physical Exam:    Body mass index is 36.58 kg/m². Vitals:    05/09/22 0735 05/09/22 0827 05/09/22 0833   BP:  106/64 95/70   Site:  Right Upper Arm Right Upper Arm   Pulse: 88     Temp: 97.5 °F (36.4 °C)     TempSrc: Infrared     SpO2: 98%     Weight: 219 lb 12.8 oz (99.7 kg)     Height: 5' 5\" (1.651 m)       Wt Readings from Last 3 Encounters:   05/09/22 219 lb 12.8 oz (99.7 kg)   09/28/21 238 lb (108 kg)   09/04/21 236 lb 6.4 oz (107.2 kg)       GENERAL:Alert and oriented x 4 NAD, affect appropriate and obese, well hydrated, well developed.   LUNG:clear to auscultation bilaterally with normal respiratory effort  CV: Normal heart sounds, regular rate and rhythm without murmurs  EXTREMETY: no loss of hair, no edema, normal pedal pulses bilaterally  + radial pulse on right, unable to feel on left, unable to hear BP on left and unable to get BP with machine on left. Left hand warm and normal cap refill       Was the timed get up and go unsteady or longer than 20 seconds: Yes      Assessment/Plan:    Arlen Han was seen today for medicare awv. Diagnoses and all orders for this visit:    Well adult exam  Recommended screenings discussed and ordered if patient agreed  Recommended vaccinations discussed and ordered if patient agreed  Encouraged healthy diet   Encouraged regular exercise and maintaining a healthy weight    Medicare Safety Interventions: Home safety tips provided  Individualized [address] [place] [address] included in patient instructions and AVS    Essential hypertension  -     Comprehensive Metabolic Panel; Future  -     CBC; Future  -     Lipid Panel; Future  BP seems ok to low on right but reports ok at home  Check BP on right and let me know if remains low as may need to decrease meds again    Stenosis of left subclavian artery (HCC)  -     Cancel: Kieran Pan MD, Vascular Surgery, [facility]  -     Kieran Pan MD, Vascular Surgery, [facility]  Needs to f/u with vascular, stressed this is VERY important. Discussed warning signs of vascular emergency in arm  Discussed dizziness ? Due to this and flow to brain    IGT (impaired glucose tolerance)  -     Hemoglobin A1C; Future  (Labs ordered contributed to MDM)  Continue to work on weight loss and diet which she is doing VERY well with    Other hyperlipidemia  -Stable, continue current medications. B12 deficiency  -     Vitamin B12 & Folate;  Future  Recheck labs  (Labs ordered contributed to MDM)    Class 2 severe obesity due to excess calories with serious comorbidity and body mass index (BMI) of 36.0 to 36.9 in adult [facility])  Doing well with diet, continue to work on    Other specified disorders of bone density and structure, other site  -     DEXA BONE DENSITY AXIAL SKELETON; Future    Anxiety  -Stable, continue current medications. Chronic low back pain, unspecified back pain laterality, unspecified whether sciatica present  Discussed referral to spine doc in future, need to get vascular issues figured out first    Personal history of tobacco use  -     DC VISIT TO DISCUSS LUNG CA SCREEN W LDCT  -     CT Lung Screen (Annual); Future  STRONGLY encouraged to quit  Discussed chantix and wellbutrin briefly  Discouraged nicotine replacement given vascular issues    Dizziness  -     [facility]  ? Due to subclavian dx  Therapy may help with balance and strength    Balance disorder  -     [facility] - [facility]            Return in about 2 months (around 7/9/2022) for Follow up, 30 min. Portions of Note per  Fabienne Payan CMA AAMA with corrections and edits per Jacinto Puga MD.  I agree with entirety of note and was present and performed history and physical.  I also confirm that the note above accurately reflects all work, treatment, procedures, and medical decision making performed by me, Jacinto Puga MD    Low Dose CT (LDCT) Lung Screening criteria met:     Age 50-77(Medicare) or 50-80 (USPST)   Pack year smoking >20   Still smoking or less than 15 year since quit   No sign or symptoms of lung cancer   > 11 months since last LDCT     Risks and benefits of lung cancer screening with LDCT scans discussed:    Significance of positive screen - False-positive LDCT results often occur. 95% of all positive results do not lead to a diagnosis of cancer. Usually further imaging can resolve most false-positive results; however, some patients may require invasive procedures. Over diagnosis risk - 10% to 12% of screen-detected lung cancer cases are over diagnosed--that is, the cancer would not have been detected in the patient's lifetime without the screening.     Need for follow up screens annually to continue lung cancer screening effectiveness     Risks associated with radiation from annual LDCT- Radiation exposure is about the same as for a mammogram, which is about 1/3 of the annual background radiation exposure from everyday life. Starting screening at age 54 is not likely to increase cancer risk from radiation exposure. Patients with comorbidities resulting in life expectancy of < 10 years, or that would preclude treatment of an abnormality identified on CT, should not be screened due to lack of benefit.     To obtain maximal benefit from this screening, smoking cessation and long-term abstinence from smoking is critical

## 2022-05-09 ENCOUNTER — TELEPHONE (OUTPATIENT)
Dept: CASE MANAGEMENT | Age: 67
End: 2022-05-09

## 2022-05-09 ENCOUNTER — OFFICE VISIT (OUTPATIENT)
Dept: FAMILY MEDICINE CLINIC | Age: 67
End: 2022-05-09
Payer: MEDICARE

## 2022-05-09 VITALS
SYSTOLIC BLOOD PRESSURE: 95 MMHG | DIASTOLIC BLOOD PRESSURE: 70 MMHG | HEART RATE: 88 BPM | BODY MASS INDEX: 36.62 KG/M2 | WEIGHT: 219.8 LBS | OXYGEN SATURATION: 98 % | TEMPERATURE: 97.5 F | HEIGHT: 65 IN

## 2022-05-09 DIAGNOSIS — M54.50 CHRONIC LOW BACK PAIN, UNSPECIFIED BACK PAIN LATERALITY, UNSPECIFIED WHETHER SCIATICA PRESENT: ICD-10-CM

## 2022-05-09 DIAGNOSIS — E78.49 OTHER HYPERLIPIDEMIA: ICD-10-CM

## 2022-05-09 DIAGNOSIS — Z00.00 WELL ADULT EXAM: Primary | ICD-10-CM

## 2022-05-09 DIAGNOSIS — G89.29 CHRONIC LOW BACK PAIN, UNSPECIFIED BACK PAIN LATERALITY, UNSPECIFIED WHETHER SCIATICA PRESENT: ICD-10-CM

## 2022-05-09 DIAGNOSIS — Z87.891 PERSONAL HISTORY OF TOBACCO USE: ICD-10-CM

## 2022-05-09 DIAGNOSIS — I10 ESSENTIAL HYPERTENSION: ICD-10-CM

## 2022-05-09 DIAGNOSIS — E53.8 B12 DEFICIENCY: ICD-10-CM

## 2022-05-09 DIAGNOSIS — R26.89 BALANCE DISORDER: ICD-10-CM

## 2022-05-09 DIAGNOSIS — R42 DIZZINESS: ICD-10-CM

## 2022-05-09 DIAGNOSIS — F41.9 ANXIETY: ICD-10-CM

## 2022-05-09 DIAGNOSIS — Z78.0 POSTMENOPAUSAL: ICD-10-CM

## 2022-05-09 DIAGNOSIS — E66.01 CLASS 2 SEVERE OBESITY DUE TO EXCESS CALORIES WITH SERIOUS COMORBIDITY AND BODY MASS INDEX (BMI) OF 36.0 TO 36.9 IN ADULT (HCC): ICD-10-CM

## 2022-05-09 DIAGNOSIS — I77.1 STENOSIS OF LEFT SUBCLAVIAN ARTERY (HCC): ICD-10-CM

## 2022-05-09 DIAGNOSIS — R73.02 IGT (IMPAIRED GLUCOSE TOLERANCE): ICD-10-CM

## 2022-05-09 PROBLEM — E66.9 OBESITY (BMI 30-39.9): Status: RESOLVED | Noted: 2021-09-07 | Resolved: 2022-05-09

## 2022-05-09 PROCEDURE — 3017F COLORECTAL CA SCREEN DOC REV: CPT | Performed by: FAMILY MEDICINE

## 2022-05-09 PROCEDURE — 99214 OFFICE O/P EST MOD 30 MIN: CPT | Performed by: FAMILY MEDICINE

## 2022-05-09 PROCEDURE — G8417 CALC BMI ABV UP PARAM F/U: HCPCS | Performed by: FAMILY MEDICINE

## 2022-05-09 PROCEDURE — 1090F PRES/ABSN URINE INCON ASSESS: CPT | Performed by: FAMILY MEDICINE

## 2022-05-09 PROCEDURE — 4004F PT TOBACCO SCREEN RCVD TLK: CPT | Performed by: FAMILY MEDICINE

## 2022-05-09 PROCEDURE — G8427 DOCREV CUR MEDS BY ELIG CLIN: HCPCS | Performed by: FAMILY MEDICINE

## 2022-05-09 PROCEDURE — G8399 PT W/DXA RESULTS DOCUMENT: HCPCS | Performed by: FAMILY MEDICINE

## 2022-05-09 PROCEDURE — 4040F PNEUMOC VAC/ADMIN/RCVD: CPT | Performed by: FAMILY MEDICINE

## 2022-05-09 PROCEDURE — G0439 PPPS, SUBSEQ VISIT: HCPCS | Performed by: FAMILY MEDICINE

## 2022-05-09 PROCEDURE — 1123F ACP DISCUSS/DSCN MKR DOCD: CPT | Performed by: FAMILY MEDICINE

## 2022-05-09 PROCEDURE — G0296 VISIT TO DETERM LDCT ELIG: HCPCS | Performed by: FAMILY MEDICINE

## 2022-05-09 NOTE — PATIENT INSTRUCTIONS
Check with pharmacy about getting the shingles vaccine, Shingrix (not Zostavax)     Send me your Covid booster in My Chart    Bring in copy of living will and healthcare power of  for your chart. Well Visit, Over 72: Care Instructions  Overview     Well visits can help you stay healthy. Your doctor has checked your overall health and may have suggested ways to take good care of yourself. Your doctor also may have recommended tests. At home, you can help prevent illness withhealthy eating, regular exercise, and other steps. Follow-up care is a key part of your treatment and safety. Be sure to make and go to all appointments, and call your doctor if you are having problems. It's also a good idea to know your test results and keep alist of the medicines you take. How can you care for yourself at home?  Get screening tests that you and your doctor decide on. Screening helps find diseases before any symptoms appear.  Eat healthy foods. Choose fruits, vegetables, whole grains, protein, and low-fat dairy foods. Limit fat, especially saturated fat. Reduce salt in your diet.  Limit alcohol. If you are a man, have no more than 2 drinks a day or 14 drinks a week. If you are a woman, have no more than 1 drink a day or 7 drinks a week. Since alcohol affects older adults differently, you may want to limit alcohol even more. Or you may not want to drink at all.  Get at least 30 minutes of exercise on most days of the week. Walking is a good choice. You also may want to do other activities, such as running, swimming, cycling, or playing tennis or team sports.  Reach and stay at a healthy weight. This will lower your risk for many problems, such as obesity, diabetes, heart disease, and high blood pressure.  Do not smoke. Smoking can make health problems worse. If you need help quitting, talk to your doctor about stop-smoking programs and medicines.  These can increase your chances of quitting for good.   Care for your mental health. It is easy to get weighed down by worry and stress. Learn strategies to manage stress, like deep breathing and mindfulness, and stay connected with your family and community. If you find you often feel sad or hopeless, talk with your doctor. Treatment can help.  Talk to your doctor about whether you have any risk factors for sexually transmitted infections (STIs). You can help prevent STIs if you wait to have sex with a new partner (or partners) until you've each been tested for STIs. It also helps if you use condoms (male or female condoms) and if you limit your sex partners to one person who only has sex with you. Vaccines are available for some STIs.  If you think you may have a problem with alcohol or drug use, talk to your doctor. This includes prescription medicines (such as amphetamines and opioids) and illegal drugs (such as cocaine and methamphetamine). Your doctor can help you figure out what type of treatment is best for you.  Protect your skin from too much sun. When you're outdoors from 10 a.m. to 4 p.m., stay in the shade or cover up with clothing and a hat with a wide brim. Wear sunglasses that block UV rays. Even when it's cloudy, put broad-spectrum sunscreen (SPF 30 or higher) on any exposed skin.  See a dentist one or two times a year for checkups and to have your teeth cleaned.  Wear a seat belt in the car. When should you call for help? Watch closely for changes in your health, and be sure to contact your doctor if you have any problems or symptoms that concern you. Where can you learn more? Go to https://george.[facility]. org and sign in to your [employer] account. Enter L952 in the [employer] box to learn more about \"Well Visit, Over 65: Care Instructions. \"     If you do not have an account, please click on the \"Sign Up Now\" link.   Current as of: October 6, 2021               Content Version: 13.2  © 7437-0053 Healthwise, Incorporated. Care instructions adapted under license by [facility]. If you have questions about a medical condition or this instruction, always ask your healthcare professional. [address] any warranty or liability for your use of this information. What is lung cancer screening? Lung cancer screening is a way in which doctors check the lungs for early signs of cancer in people who have no symptoms of lung cancer. A low-dose CT scan uses much less radiation than a normal CT scan and shows a more detailed image of the lungs than a standard X-ray. The goal of lung cancer screening is to find cancer early, before it has a chance to grow, spread, or cause problems. One large study found that smokers who were screened with low-dose CT scans were less likely to die of lung cancer than those who were screened with standard X-ray. Below is a summary of the things you need to know regarding screening for lung cancer with low-dose computed tomography (LDCT). This is a screening program that involves routine annual screening with LDCT studies of the lung. The LDCTs are done using low-dose radiation that is not thought to increase your cancer risk. If you have other serious medical conditions (other cancers, congestive heart failure) that limit your life expectancy to less than 10 years, you should not undergo lung cancer screening with LDCT. The chance is 20%-60% that the LDCT result will show abnormalities. This would require additional testing which could include repeat imaging or even invasive procedures. Most (about 95%) of \"abnormal\" LDCT results are false in the sense that no lung cancer is ultimately found. Additionally, some (about 10%) of the cancers found would not affect your life expectancy, even if undetected and untreated. If you are still smoking, the single most important thing that you can do to reduce your risk of dying of lung cancer is to quit.     For this screening to be covered by Medicare and most other insurers, strict criteria must be met. If you do not meet these criteria, but still wish to undergo LDCT testing, you will be required to sign a waiver indicating your willingness to pay for the scan.

## 2022-05-11 ENCOUNTER — OFFICE VISIT (OUTPATIENT)
Dept: VASCULAR SURGERY | Age: 67
End: 2022-05-11
Payer: MEDICARE

## 2022-05-11 ENCOUNTER — HOSPITAL ENCOUNTER (OUTPATIENT)
Age: 67
Discharge: HOME | End: 2022-05-11
Payer: MEDICARE

## 2022-05-11 ENCOUNTER — PROCEDURE VISIT (OUTPATIENT)
Dept: VASCULAR SURGERY | Age: 67
End: 2022-05-11
Payer: MEDICARE

## 2022-05-11 VITALS
SYSTOLIC BLOOD PRESSURE: 90 MMHG | DIASTOLIC BLOOD PRESSURE: 60 MMHG | BODY MASS INDEX: 36.72 KG/M2 | HEIGHT: 65 IN | WEIGHT: 220.4 LBS

## 2022-05-11 DIAGNOSIS — R73.02 IGT (IMPAIRED GLUCOSE TOLERANCE): ICD-10-CM

## 2022-05-11 DIAGNOSIS — I10 ESSENTIAL HYPERTENSION: ICD-10-CM

## 2022-05-11 DIAGNOSIS — I65.23 BILATERAL CAROTID ARTERY STENOSIS: Primary | ICD-10-CM

## 2022-05-11 DIAGNOSIS — E53.8 B12 DEFICIENCY: ICD-10-CM

## 2022-05-11 LAB
A/G RATIO: 1.8 (ref 1.1–2.2)
ALBUMIN SERPL-MCNC: 4.2 G/DL (ref 3.4–5)
ALP BLD-CCNC: 84 U/L (ref 40–129)
ALT SERPL-CCNC: 27 U/L (ref 10–40)
ANION GAP SERPL CALCULATED.3IONS-SCNC: 12 MMOL/L (ref 3–16)
AST SERPL-CCNC: 19 U/L (ref 15–37)
BILIRUB SERPL-MCNC: 0.5 MG/DL (ref 0–1)
BUN BLDV-MCNC: 15 MG/DL (ref 7–20)
CALCIUM SERPL-MCNC: 9.8 MG/DL (ref 8.3–10.6)
CHLORIDE BLD-SCNC: 104 MMOL/L (ref 99–110)
CHOLESTEROL, TOTAL: 141 MG/DL (ref 0–199)
CO2: 26 MMOL/L (ref 21–32)
CREAT SERPL-MCNC: 0.7 MG/DL (ref 0.6–1.2)
FOLATE: >20 NG/ML (ref 4.78–24.2)
GFR AFRICAN AMERICAN: >60
GFR NON-AFRICAN AMERICAN: >60
GLUCOSE BLD-MCNC: 94 MG/DL (ref 70–99)
HCT VFR BLD CALC: 45.9 % (ref 36–48)
HDLC SERPL-MCNC: 56 MG/DL (ref 40–60)
HEMOGLOBIN: 15.1 G/DL (ref 12–16)
LDL CHOLESTEROL CALCULATED: 70 MG/DL
MCH RBC QN AUTO: 30.7 PG (ref 26–34)
MCHC RBC AUTO-ENTMCNC: 32.9 G/DL (ref 31–36)
MCV RBC AUTO: 93.3 FL (ref 80–100)
PDW BLD-RTO: 13.5 % (ref 12.4–15.4)
PLATELET # BLD: 249 K/UL (ref 135–450)
PMV BLD AUTO: 8.3 FL (ref 5–10.5)
POTASSIUM SERPL-SCNC: 4.9 MMOL/L (ref 3.5–5.1)
RBC # BLD: 4.92 M/UL (ref 4–5.2)
SODIUM BLD-SCNC: 142 MMOL/L (ref 136–145)
TOTAL PROTEIN: 6.5 G/DL (ref 6.4–8.2)
TRIGL SERPL-MCNC: 76 MG/DL (ref 0–150)
VITAMIN B-12: 882 PG/ML (ref 211–911)
VLDLC SERPL CALC-MCNC: 15 MG/DL
WBC # BLD: 6.1 K/UL (ref 4–11)

## 2022-05-11 PROCEDURE — 82746 ASSAY OF FOLIC ACID SERUM: CPT

## 2022-05-11 PROCEDURE — 1090F PRES/ABSN URINE INCON ASSESS: CPT | Performed by: SURGERY

## 2022-05-11 PROCEDURE — G8399 PT W/DXA RESULTS DOCUMENT: HCPCS | Performed by: SURGERY

## 2022-05-11 PROCEDURE — 80061 LIPID PANEL: CPT

## 2022-05-11 PROCEDURE — 99213 OFFICE O/P EST LOW 20 MIN: CPT | Performed by: SURGERY

## 2022-05-11 PROCEDURE — 36415 COLL VENOUS BLD VENIPUNCTURE: CPT

## 2022-05-11 PROCEDURE — 83036 HEMOGLOBIN GLYCOSYLATED A1C: CPT

## 2022-05-11 PROCEDURE — 3017F COLORECTAL CA SCREEN DOC REV: CPT | Performed by: SURGERY

## 2022-05-11 PROCEDURE — 4040F PNEUMOC VAC/ADMIN/RCVD: CPT | Performed by: SURGERY

## 2022-05-11 PROCEDURE — 85027 COMPLETE CBC AUTOMATED: CPT

## 2022-05-11 PROCEDURE — 1123F ACP DISCUSS/DSCN MKR DOCD: CPT | Performed by: SURGERY

## 2022-05-11 PROCEDURE — 4004F PT TOBACCO SCREEN RCVD TLK: CPT | Performed by: SURGERY

## 2022-05-11 PROCEDURE — 82607 VITAMIN B-12: CPT

## 2022-05-11 PROCEDURE — G8417 CALC BMI ABV UP PARAM F/U: HCPCS | Performed by: SURGERY

## 2022-05-11 PROCEDURE — 80053 COMPREHEN METABOLIC PANEL: CPT

## 2022-05-11 PROCEDURE — 93880 EXTRACRANIAL BILAT STUDY: CPT | Performed by: SURGERY

## 2022-05-11 PROCEDURE — G8427 DOCREV CUR MEDS BY ELIG CLIN: HCPCS | Performed by: SURGERY

## 2022-05-11 NOTE — PROGRESS NOTES
[facility])   Vascular Surgery Followup    Referring Provider:  Bhavya Lamar MD     Chief Complaint   Patient presents with    Follow-up        History of Present Illness:  26-year-old female here today for follow-up with history of lung adenocarcinoma, hypertension and hyperlipidemia who was initially seen in consultation in September 2021 for left arm claudication. She was noted on CT scan of her chest in 2019 to have a left subclavian stenosis. She is here today for follow-up duplex imaging. She continues to struggle significantly with dizziness. Her  states it is affecting her quality of life. She is scared to drive because of this. She still denies any significant left arm claudication. She does continue to smoke 1/2 pack/day. Past Medical History:   has a past medical history of History of lung cancer, Hyperlipidemia, and Hypertension. Surgical History:   has a past surgical history that includes Lung surgery (2011) and back surgery (2014). Social History:   reports that she has been smoking cigarettes. She started smoking about 51 years ago. She has a 25.00 pack-year smoking history. She has never used smokeless tobacco. She reports previous alcohol use. She reports that she does not use drugs. Family History:  family history includes COPD in her sister; Cancer in her mother; Diabetes in her maternal grandfather; High Cholesterol in her mother and sister; No Known Problems in her brother, brother, and father.      Home Medications:  Current Outpatient Medications   Medication Sig Dispense Refill    atorvastatin (LIPITOR) 40 MG tablet Take 1 tablet by mouth daily 30 tablet 11    losartan (COZAAR) 50 MG tablet Take 1 tablet by mouth daily 30 tablet 11    venlafaxine (EFFEXOR XR) 37.5 MG extended release capsule Take 1 capsule by mouth daily 90 capsule 3    Multiple Vitamins-Minerals (CENTRUM SILVER 50+WOMEN PO) Take by mouth       No current facility-administered medications for this visit. Allergies:  Codeine     Review of Systems:   · Constitutional: there has been no unanticipated weight loss. There's been no change in energy level, sleep pattern, or activity level. · Eyes: No visual changes or diplopia. No scleral icterus. · ENT: No Headaches, hearing loss or vertigo. No mouth sores or sore throat. · Cardiovascular: Reviewed in HPI  · Respiratory: No cough or wheezing, no sputum production. No hematemesis. · Gastrointestinal: No abdominal pain, appetite loss, blood in stools. No change in bowel or bladder habits. · Genitourinary: No dysuria, trouble voiding, or hematuria. · Musculoskeletal:  No gait disturbance, weakness or joint complaints. · Integumentary: No rash or pruritis. · Neurological: No headache, diplopia, change in muscle strength, numbness or tingling. No change in gait, balance, coordination, mood, affect, memory, mentation, behavior. · Psychiatric: No anxiety, no depression. · Endocrine: No malaise, fatigue or temperature intolerance. No excessive thirst, fluid intake, or urination. No tremor. · Hematologic/Lymphatic: No abnormal bruising or bleeding, blood clots or swollen lymph nodes. · Allergic/Immunologic: No nasal congestion or hives. Physical Examination:    Vitals:    05/11/22 1006   BP: 90/60          General appearance: alert, appears stated age, cooperative and no distress  Head: Normocephalic, without obvious abnormality, atraumatic  Neck: no adenopathy, no carotid bruit, no JVD, supple, symmetrical, trachea midline and thyroid: not enlarged, symmetric, no tenderness/mass/nodules  Lungs: clear to auscultation bilaterally  Heart: regular rate and rhythm, S1, S2 normal, no murmur, click, rub or gallop  Abdomen: soft, non-tender.  Bowel sounds normal. No masses,  no organomegaly  Extremities: extremities normal, atraumatic, no cyanosis or edema    MEDICAL DECISION MAKING/TESTING  I have reviewed the testing personally and my interpretation is below. Ultrasound today shows no stenosis of the right internal carotid artery with less than 50% of the left. Retrograde left vertebral artery flow was noted. Difference in systolic blood pressure between the left and right extremity    Assessment:     Patient Active Problem List   Diagnosis    Other hyperlipidemia    Essential hypertension    Tobacco abuse    B12 deficiency    Folate deficiency    Class 2 obesity due to excess calories without serious comorbidity with body mass index (BMI) of 39.0 to 39.9 in adult    Menopausal symptoms    Anxiety    IGT (impaired glucose tolerance)    Syncope    Stenosis of left subclavian artery (HCC)    Chronic low back pain       Plan:  64-year-old female with persistent dizziness. No other etiology has been identified. She does have retrograde left vertebral artery flow with a left subclavian stenosis versus occlusion. I had an extensive discussion with her and her  today. We will plan to move forward with retrograde brachial artery angiogram and possible left subclavian stenting and further delineation of anatomy with the subclavian stenosis and vertebral artery. There may be additional considerations if unable to stent for a carotid to subclavian bypass. I did discuss with her and her  in detail today that the likelihood of subclavian intervention resolving her dizziness is relatively low however there is still possibility that this could be the etiology. Thank you for allowing me to participate in the care of this individual.  Please do not hesitate to contact me with any questions. Gianni Heard M.D., FACS.   5/11/2022  10:05 AM

## 2022-05-11 NOTE — LETTER
[facility] [place] ([facility]) - Vascular and Endovascular Surgeons  [address] [address]  Phone: [phone]  Fax: 79 Hector Alba II, MD      May 11, 2022     Patient: Nneka Toribio   MR Number: [id number]   YOB: 1955   Date of Visit: 5/11/2022       Dear Dr. Clements Gave:    Thank you for referring Cephus Poplar to me for evaluation/treatment. Below are the relevant portions of my assessment and plan of care. If you have questions, please do not hesitate to call me. I look forward to following Carloz Hutton along with you.     Sincerely,        Lindy Skinner MD    CC providers:  Denny Vincent, [address] [facility] [address] [address]

## 2022-05-12 ENCOUNTER — PREP FOR PROCEDURE (OUTPATIENT)
Dept: VASCULAR SURGERY | Age: 67
End: 2022-05-12

## 2022-05-12 LAB
ESTIMATED AVERAGE GLUCOSE: 122.6 MG/DL
HBA1C MFR BLD: 5.9 %

## 2022-05-12 RX ORDER — SODIUM CHLORIDE 0.9 % (FLUSH) 0.9 %
5-40 SYRINGE (ML) INJECTION PRN
Status: CANCELLED | OUTPATIENT
Start: 2022-05-12

## 2022-05-12 RX ORDER — SODIUM CHLORIDE 0.9 % (FLUSH) 0.9 %
5-40 SYRINGE (ML) INJECTION EVERY 12 HOURS SCHEDULED
Status: CANCELLED | OUTPATIENT
Start: 2022-05-12

## 2022-05-12 RX ORDER — SODIUM CHLORIDE 9 MG/ML
INJECTION, SOLUTION INTRAVENOUS PRN
Status: CANCELLED | OUTPATIENT
Start: 2022-05-12

## 2022-05-13 ENCOUNTER — TELEPHONE (OUTPATIENT)
Dept: FAMILY MEDICINE CLINIC | Age: 67
End: 2022-05-13

## 2022-05-13 NOTE — TELEPHONE ENCOUNTER
Milagros Trinh with [employer] calling to advise Dr. Neida Aden that the DEXA scan that was ordered for the patient on 5/9 cannot be covered by medicare due to the dx code on the order. They are requesting a different dx if at all possible so that medicare can cover it. States patient would like to be notified when it is changed so that she can schedule. Patient's phone number [phone].      Please advise

## 2022-06-06 ENCOUNTER — HOSPITAL ENCOUNTER (OUTPATIENT)
Dept: CARDIAC CATH/INVASIVE PROCEDURES | Age: 67
Discharge: HOME | End: 2022-06-06
Attending: SURGERY | Admitting: SURGERY
Payer: MEDICARE

## 2022-06-06 VITALS
SYSTOLIC BLOOD PRESSURE: 135 MMHG | HEIGHT: 65 IN | WEIGHT: 220 LBS | HEART RATE: 75 BPM | BODY MASS INDEX: 36.65 KG/M2 | OXYGEN SATURATION: 96 % | RESPIRATION RATE: 16 BRPM | DIASTOLIC BLOOD PRESSURE: 82 MMHG

## 2022-06-06 PROCEDURE — 76937 US GUIDE VASCULAR ACCESS: CPT | Performed by: SURGERY

## 2022-06-06 PROCEDURE — C1887 CATHETER, GUIDING: HCPCS

## 2022-06-06 PROCEDURE — 75710 ARTERY X-RAYS ARM/LEG: CPT

## 2022-06-06 PROCEDURE — 6360000002 HC RX W HCPCS

## 2022-06-06 PROCEDURE — 2500000003 HC RX 250 WO HCPCS

## 2022-06-06 PROCEDURE — 36140 INTRO NDL ICATH UPR/LXTR ART: CPT | Performed by: SURGERY

## 2022-06-06 PROCEDURE — 99152 MOD SED SAME PHYS/QHP 5/>YRS: CPT | Performed by: SURGERY

## 2022-06-06 PROCEDURE — 36140 INTRO NDL ICATH UPR/LXTR ART: CPT

## 2022-06-06 PROCEDURE — C1894 INTRO/SHEATH, NON-LASER: HCPCS

## 2022-06-06 PROCEDURE — 99152 MOD SED SAME PHYS/QHP 5/>YRS: CPT

## 2022-06-06 PROCEDURE — 6360000004 HC RX CONTRAST MEDICATION: Performed by: SURGERY

## 2022-06-06 PROCEDURE — 75710 ARTERY X-RAYS ARM/LEG: CPT | Performed by: SURGERY

## 2022-06-06 PROCEDURE — C1769 GUIDE WIRE: HCPCS

## 2022-06-06 RX ORDER — SODIUM CHLORIDE 0.9 % (FLUSH) 0.9 %
5-40 SYRINGE (ML) INJECTION PRN
Status: DISCONTINUED | OUTPATIENT
Start: 2022-06-06 | End: 2022-06-06 | Stop reason: HOSPADM

## 2022-06-06 RX ORDER — ACETAMINOPHEN 325 MG/1
650 TABLET ORAL EVERY 4 HOURS PRN
Status: DISCONTINUED | OUTPATIENT
Start: 2022-06-06 | End: 2022-06-06 | Stop reason: HOSPADM

## 2022-06-06 RX ORDER — SODIUM CHLORIDE 9 MG/ML
INJECTION, SOLUTION INTRAVENOUS PRN
Status: DISCONTINUED | OUTPATIENT
Start: 2022-06-06 | End: 2022-06-06 | Stop reason: HOSPADM

## 2022-06-06 RX ORDER — SODIUM CHLORIDE 0.9 % (FLUSH) 0.9 %
5-40 SYRINGE (ML) INJECTION EVERY 12 HOURS SCHEDULED
Status: DISCONTINUED | OUTPATIENT
Start: 2022-06-06 | End: 2022-06-06 | Stop reason: HOSPADM

## 2022-06-06 RX ORDER — IODIXANOL 320 MG/ML
5 INJECTION, SOLUTION INTRAVASCULAR
Status: COMPLETED | OUTPATIENT
Start: 2022-06-06 | End: 2022-06-06

## 2022-06-06 RX ADMIN — IODIXANOL 5 ML: 320 INJECTION, SOLUTION INTRAVASCULAR at 12:25

## 2022-06-06 NOTE — H&P
H&P Update    I have reviewed the history and physical and examined the patient and find no relevant changes. I have reviewed with the patient and/or family the risks, benefits, and alternatives to the procedure. Pre-sedation Assessment    Patient:  Taina Varghese   :   1955  Intended Procedure:  Abdominal aortogram with lower extremity evaluation and possible intervention. Luly Terry nurses notes reviewed and agreed. Medications reviewed  Allergies:    Allergies   Allergen Reactions    Codeine Other (See Comments)     Wasn't able to speak or move         Pre-Procedure Assessment/Plan:  ASA 3 - Patient with moderate systemic disease with functional limitations  Malampati 3    Level of Sedation Plan:Mild sedation    Post Procedure plan: Return to same level of care      Signed:  Divya Jones MD, 2022, 11:34 AM

## 2022-06-07 NOTE — OP NOTE
[address]                     [facility], [address] [place] [address]                                OPERATIVE REPORT    PATIENT NAME: Dahlia Montes                  :        1955  MED REC NO:   [id number]                          ROOM:  ACCOUNT NO:   [de-identified]                           ADMIT DATE: 2022  PROVIDER:     Roz Baumann MD    DATE OF PROCEDURE:  2022    PREPROCEDURE DIAGNOSIS:  Vertebrobasilar insufficiency. POSTPROCEDURE DIAGNOSIS:  Vertebrobasilar insufficiency. PROCEDURE PERFORMED:  1. Ultrasound-guided retrograde left brachial artery access. 2.  Left upper extremity and subclavian angiogram.    SURGEON:  Roz Baumann MD.    ANESTHESIA  Local/IV. ESTIMATED BLOOD LOSS:  Minimal.    COMPLICATIONS:  None. INDICATIONS:  The patient is a 27-year-old female, who has persistent  dizziness with concerns for vertebrobasilar insufficiency secondary to  retrograde vertebral artery flow from a proximal left subclavian  stenosis. She presents today for angiographic evaluation. All risks,  benefits, and alternatives were discussed in detail. All questions were  answered. PROCEDURE:  After witnessed informed consent was obtained, the patient  was brought to the cath lab, where under my supervision Versed and  fentanyl were administered intravenously for moderate sedation. Pulse  oximetry, heart rate, and blood pressure were monitored by an  independent trained observer that was present. I spent 19 minutes of  face-to-face sedation time with the patient. Her left arm was carefully  prepped and draped. Ultrasound was used to identify the left brachial  artery, which was patent and image was saved to the patient's permanent  medical record. Under direct visualization, it was accessed with a  4-Luxembourger micropuncture needle. Bentson wire was introduced, and a  5-Luxembourger sheath was placed.     Daniel Baywood was placed into the axillary and subclavian arteries and  selective angiograms were performed showing complete occlusion of the  left subclavian artery proximal to the left vertebral artery. There was  a widely patent left vertebral and left internal mammary artery. There  was no evidence of disease distal to the occlusion. Attempts were made  to cross the occlusion without success, and the procedure was  terminated. Manual pressure was held. She was transferred to recovery  room in stable condition.         Debo Collins MD    D: 06/06/2022 12:15:05       T: 06/06/2022 12:17:29     [email or web address]  Job#: [id number]     Doc#: [id number]    CC:

## 2022-06-27 ENCOUNTER — OFFICE VISIT (OUTPATIENT)
Dept: FAMILY MEDICINE CLINIC | Age: 67
End: 2022-06-27
Payer: MEDICARE

## 2022-06-27 VITALS
WEIGHT: 219.13 LBS | BODY MASS INDEX: 36.46 KG/M2 | TEMPERATURE: 97.5 F | HEART RATE: 73 BPM | SYSTOLIC BLOOD PRESSURE: 138 MMHG | DIASTOLIC BLOOD PRESSURE: 84 MMHG | OXYGEN SATURATION: 97 %

## 2022-06-27 DIAGNOSIS — Z01.818 PREOP EXAMINATION: ICD-10-CM

## 2022-06-27 DIAGNOSIS — H26.9 CATARACT OF BOTH EYES, UNSPECIFIED CATARACT TYPE: Primary | ICD-10-CM

## 2022-06-27 PROCEDURE — G8427 DOCREV CUR MEDS BY ELIG CLIN: HCPCS | Performed by: NURSE PRACTITIONER

## 2022-06-27 PROCEDURE — 1090F PRES/ABSN URINE INCON ASSESS: CPT | Performed by: NURSE PRACTITIONER

## 2022-06-27 PROCEDURE — G8417 CALC BMI ABV UP PARAM F/U: HCPCS | Performed by: NURSE PRACTITIONER

## 2022-06-27 PROCEDURE — G8399 PT W/DXA RESULTS DOCUMENT: HCPCS | Performed by: NURSE PRACTITIONER

## 2022-06-27 PROCEDURE — 4004F PT TOBACCO SCREEN RCVD TLK: CPT | Performed by: NURSE PRACTITIONER

## 2022-06-27 PROCEDURE — 3017F COLORECTAL CA SCREEN DOC REV: CPT | Performed by: NURSE PRACTITIONER

## 2022-06-27 PROCEDURE — 99213 OFFICE O/P EST LOW 20 MIN: CPT | Performed by: NURSE PRACTITIONER

## 2022-06-27 PROCEDURE — 1123F ACP DISCUSS/DSCN MKR DOCD: CPT | Performed by: NURSE PRACTITIONER

## 2022-06-27 SDOH — ECONOMIC STABILITY: FOOD INSECURITY: WITHIN THE PAST 12 MONTHS, THE FOOD YOU BOUGHT JUST DIDN'T LAST AND YOU DIDN'T HAVE MONEY TO GET MORE.: NEVER TRUE

## 2022-06-27 SDOH — ECONOMIC STABILITY: FOOD INSECURITY: WITHIN THE PAST 12 MONTHS, YOU WORRIED THAT YOUR FOOD WOULD RUN OUT BEFORE YOU GOT MONEY TO BUY MORE.: NEVER TRUE

## 2022-06-27 ASSESSMENT — SOCIAL DETERMINANTS OF HEALTH (SDOH): HOW HARD IS IT FOR YOU TO PAY FOR THE VERY BASICS LIKE FOOD, HOUSING, MEDICAL CARE, AND HEATING?: NOT VERY HARD

## 2022-06-27 NOTE — PROGRESS NOTES
Preoperative Consultation    Patrick Pat  YOB: 1955    This patient presents to the office today for a preoperative consultation at the request of surgeon, Dr. Sary Everett, who plans on performing right cataract on July 6 at 1030.       Planned anesthesia: Topical anesthesia   Known anesthesia problems: None   Bleeding risk: No recent or remote history of abnormal bleeding  Personal or FH of DVT/PE: No      Patient Active Problem List   Diagnosis    Other hyperlipidemia    Essential hypertension    Tobacco abuse    B12 deficiency    Folate deficiency    Class 2 obesity due to excess calories without serious comorbidity with body mass index (BMI) of 39.0 to 39.9 in adult    Menopausal symptoms    Anxiety    IGT (impaired glucose tolerance)    Syncope    Stenosis of left subclavian artery (HCC)    Chronic low back pain    Atherosclerosis of native arteries of extremities with intermittent claudication, other extremity ([address]     Past Surgical History:   Procedure Laterality Date    BACK SURGERY  2014    spinal stenosis    LUNG SURGERY  2011    left VATS for stage IA adenocarcinoma       Allergies   Allergen Reactions    Codeine Other (See Comments)     Wasn't able to speak or move     Outpatient Medications Marked as Taking for the 6/27/22 encounter (Office Visit) with TORREY Natarajan NP   Medication Sig Dispense Refill    atorvastatin (LIPITOR) 40 MG tablet Take 1 tablet by mouth daily 30 tablet 11    losartan (COZAAR) 50 MG tablet Take 1 tablet by mouth daily 30 tablet 11    venlafaxine (EFFEXOR XR) 37.5 MG extended release capsule Take 1 capsule by mouth daily 90 capsule 3    Multiple Vitamins-Minerals (CENTRUM SILVER 50+WOMEN PO) Take by mouth         Social History     Tobacco Use    Smoking status: Current Every Day Smoker     Packs/day: 0.50     Years: 50.00     Pack years: 25.00     Types: Cigarettes     Start date: 12/7/1970    Smokeless tobacco: Never Used    Tobacco comment: 2 packs a week   Substance Use Topics    Alcohol use: Not Currently     Comment: stopped in 2019, felt drinking too much     Family History   Problem Relation Age of Onset    Cancer Mother         thyroid    High Cholesterol Mother     No Known Problems Father     COPD Sister     High Cholesterol Sister     No Known Problems Brother     No Known Problems Brother     Diabetes Maternal Grandfather        Review of Systems  A comprehensive review of systems was negative. Physical Exam   /84 (Site: Right Upper Arm, Position: Sitting, Cuff Size: Medium Adult)   Pulse 73   Temp 97.5 °F (36.4 °C) (Infrared)   Wt 219 lb 2 oz (99.4 kg)   SpO2 97%   BMI 36.46 kg/m²   Weight: 219 lb 2 oz (99.4 kg)   Constitutional: She is oriented to person, place, and time. She appears well-developed and well-nourished. No distress. HENT:   Head: Normocephalic and atraumatic. Mouth/Throat: Uvula is midline, oropharynx is clear and moist and mucous membranes are normal.   Eyes: Conjunctivae and EOM are normal. Pupils are equal, round, and reactive to light. Neck: Trachea normal and normal range of motion. Neck supple. No JVD present. Carotid bruit is not present. No mass and no thyromegaly present. Cardiovascular: Normal rate, regular rhythm, normal heart sounds and intact, L arm post angiogram, normal distal pulses. Exam reveals no gallop and no friction rub. No murmur heard. Pulmonary/Chest: Effort normal and breath sounds normal. No respiratory distress. She has no wheezes. She has no rales. Abdominal: Soft. Normal aorta and bowel sounds are normal. She exhibits no distension and no mass. There is no hepatosplenomegaly. No tenderness. Musculoskeletal: She exhibits no edema and no tenderness. Neurological: She is alert and oriented to person, place, and time. She has normal strength. No cranial nerve deficit or sensory deficit.  Coordination and gait normal.   Skin: Skin is warm and dry. No rash noted. No erythema. EKG Interpretation:  NA. Lab Review Yes 5/11/22       Assessment:       Bonifacio Campbell was seen today for pre-op exam.    Diagnoses and all orders for this visit:    Cataract of both eyes, unspecified cataract type    Preop examination      74 y.o. patient  approved for Surgery         Plan:     1. Preoperative workup as follows: none  2. Change in medication regimen before surgery: None  3. No contraindications to planned surgery    Electronically signed by TORREY Peters NP on 6/27/22 at 8:33 AM EDT     This dictation was generated by voice recognition computer software. Although all attempts are made to edit the dictation for accuracy, there may be errors in the transcription that are not intended.

## 2022-07-18 ENCOUNTER — HOSPITAL ENCOUNTER (OUTPATIENT)
Dept: CT IMAGING | Age: 67
Discharge: HOME | End: 2022-07-18
Payer: MEDICARE

## 2022-07-18 DIAGNOSIS — Z87.891 PERSONAL HISTORY OF TOBACCO USE: ICD-10-CM

## 2022-07-18 PROCEDURE — 71271 CT THORAX LUNG CANCER SCR C-: CPT

## 2022-07-21 ENCOUNTER — TELEPHONE (OUTPATIENT)
Dept: CASE MANAGEMENT | Age: 67
End: 2022-07-21

## 2022-07-21 NOTE — TELEPHONE ENCOUNTER
Annual lung screen on 7/18/22. LRAD2. Recommended screen in one year. Reviewed by ordering physician and ordering office contacts patient with results.   Will follow in the lung screening program.

## 2022-08-04 RX ORDER — VENLAFAXINE HYDROCHLORIDE 37.5 MG/1
37.5 CAPSULE, EXTENDED RELEASE ORAL DAILY
Qty: 90 CAPSULE | Refills: 3 | Status: SHIPPED | OUTPATIENT
Start: 2022-08-04

## 2022-08-04 NOTE — TELEPHONE ENCOUNTER
Medication:   Requested Prescriptions      No prescriptions requested or ordered in this encounter          Patient Phone Number: [phone] (home)     Last appt: 6/27/2022   Next appt: 9/26/2022    Last OARRS: No flowsheet data found.   PDMP Monitoring:    Last PDMP Kristi Soto as Reviewed [facility]):  Review User Review Instant Review Result   Candis Escalante 5/4/2021 10:58 AM Reviewed PDMP [1]     Preferred Pharmacy:   [id number] [facility],[address] [facility], [address] [facility] [phone] Bernabe Jean [phone]  [address] [place] [address]  [address] [address]  Phone: [phone] Fax: [phone]    [address] [address] [address], [facility] [phone] Bernabe Jean [phone]  [address] [address]  Phone: [phone] Fax: [phone]

## 2022-09-26 ENCOUNTER — OFFICE VISIT (OUTPATIENT)
Dept: FAMILY MEDICINE CLINIC | Age: 67
End: 2022-09-26
Payer: MEDICARE

## 2022-09-26 VITALS
DIASTOLIC BLOOD PRESSURE: 72 MMHG | BODY MASS INDEX: 37.51 KG/M2 | SYSTOLIC BLOOD PRESSURE: 138 MMHG | OXYGEN SATURATION: 98 % | HEART RATE: 86 BPM | WEIGHT: 225.4 LBS | TEMPERATURE: 97.3 F

## 2022-09-26 DIAGNOSIS — R42 DIZZINESS: ICD-10-CM

## 2022-09-26 DIAGNOSIS — I77.1 STENOSIS OF LEFT SUBCLAVIAN ARTERY (HCC): ICD-10-CM

## 2022-09-26 DIAGNOSIS — F43.29 STRESS AND ADJUSTMENT REACTION: ICD-10-CM

## 2022-09-26 DIAGNOSIS — I10 ESSENTIAL HYPERTENSION: Primary | ICD-10-CM

## 2022-09-26 DIAGNOSIS — M51.36 DDD (DEGENERATIVE DISC DISEASE), LUMBAR: ICD-10-CM

## 2022-09-26 PROCEDURE — G8399 PT W/DXA RESULTS DOCUMENT: HCPCS | Performed by: FAMILY MEDICINE

## 2022-09-26 PROCEDURE — 3017F COLORECTAL CA SCREEN DOC REV: CPT | Performed by: FAMILY MEDICINE

## 2022-09-26 PROCEDURE — 1123F ACP DISCUSS/DSCN MKR DOCD: CPT | Performed by: FAMILY MEDICINE

## 2022-09-26 PROCEDURE — 4004F PT TOBACCO SCREEN RCVD TLK: CPT | Performed by: FAMILY MEDICINE

## 2022-09-26 PROCEDURE — G8427 DOCREV CUR MEDS BY ELIG CLIN: HCPCS | Performed by: FAMILY MEDICINE

## 2022-09-26 PROCEDURE — 99214 OFFICE O/P EST MOD 30 MIN: CPT | Performed by: FAMILY MEDICINE

## 2022-09-26 PROCEDURE — 1090F PRES/ABSN URINE INCON ASSESS: CPT | Performed by: FAMILY MEDICINE

## 2022-09-26 PROCEDURE — G8417 CALC BMI ABV UP PARAM F/U: HCPCS | Performed by: FAMILY MEDICINE

## 2022-09-26 RX ORDER — ASPIRIN 81 MG/1
81 TABLET ORAL DAILY
Qty: 90 TABLET | Refills: 3 | COMMUNITY
Start: 2022-09-26

## 2022-09-26 RX ORDER — ATORVASTATIN CALCIUM 40 MG/1
40 TABLET, FILM COATED ORAL DAILY
Qty: 90 TABLET | Refills: 3 | Status: SHIPPED | OUTPATIENT
Start: 2022-09-26

## 2022-09-26 RX ORDER — LOSARTAN POTASSIUM 50 MG/1
50 TABLET ORAL DAILY
Qty: 90 TABLET | Refills: 3 | Status: SHIPPED | OUTPATIENT
Start: 2022-09-26

## 2022-09-26 NOTE — PROGRESS NOTES
Chief Complaint   Patient presents with    Anxiety    Hypertension    Results     Angioplasy          Here for f/u after visit with vascular for subclavian stenosis. Pt reports she had L subclavian angiogram on 06/06/22 with Dr. Anna Palacio. He was unable to clear a blockage and could not place a stent. Reports she was instructed to follow up when she has sx and they will do open surgery. Back pain  Has been having increased issues with lower extremity weakness, numbness, and pain and lower back pain. States this has been going on since she had lumbar fusion surgery in 2014. Has seen Dr. Major Guerra for sx and is scheduled for MRI next week. Denies b/b incontinence. Dizziness  States she feels her ongoing dizziness for the past >3 years is related to her lower back/leg sx. Reports feeling off balance like she is tilting which is exacerbated by walking on any uneven surface. States she is very careful when walking and has not fallen. Recently started using a cane. Reports she was supposed to go to PT for dizziness but has not gone and doesn't plan to because the number of Dr appointments and procedures this summer was overwhelming. States she used to be very active with walking, swimming, floor exercise, and no longer can be due to her weakness and pain. Reports associated feelings of depression due to her limitations and weight gain. Denies suicidal ideation or self-harm. Notes she goes to [place] and lives in [place] [address] from October through May during which she is more active because she can swim and has more social activities. States her mood and outlook on live is much improved in [place]. Does not belong to pool while in [facility]. Dental: up-to-date, May 2022  Eye: up-to-date, May 2022 - R cataract surgery 07/06/22. Colonoscopy: up-to-date  Mammo: up-to-date, due June 2023      Exercise: none while in [place] during the summer.  While in [place], does 1 hour of exercise daily which includes swims, walks, and leg exercises against wall   Diet:  2 meals a day, fruits and veggies, chicken, fish, and pork;  limits red meat; pasta/rice/potatoes     Living Will: Yes,   Copy on file    [facility] Scores 5/4/2022 8/23/2021 5/4/2021 8/18/2020 7/17/2019 7/26/2018 11/8/2017   PHQ2 Score 0 0 0 0 0 1 2   PHQ9 Score 0 0 0 0 0 1 2       HM reviewed with pt    Patient's medications, allergies, past medical, surgical, social and family histories were reviewed and updated in the EHR as appropriate. Vitals:    09/26/22 1027 09/26/22 1113   BP: 138/78 138/72   Site: Right Upper Arm Right Upper Arm   Position: Sitting    Cuff Size: Large Adult    Pulse: 86    Temp: 97.3 °F (36.3 °C)    TempSrc: Infrared    SpO2: 98%    Weight: 225 lb 6.4 oz (102.2 kg)      Wt Readings from Last 3 Encounters:   09/26/22 225 lb 6.4 oz (102.2 kg)   06/27/22 219 lb 2 oz (99.4 kg)   06/06/22 220 lb (99.8 kg)     Body mass index is 37.51 kg/m². GENERAL Alert and oriented x 4 NAD, no acute distress, well hydrated, well developed. LUNG:clear to auscultation bilaterally with normal respiratory effort  CV: Normal heart sounds, regular rate and rhythm without murmurs  EXTREMETY: no loss of hair, no edema, normal pedal pulses bilaterally, radial pulse on L diminished but hands warm and pink  NEURO: CN grossly intact, moving all extremities equally          ASSESSMENT AND PLAN:       Amelia Berry was seen today for anxiety, hypertension and results. Diagnoses and all orders for this visit:    Essential hypertension  Controlled with medications, no SE, continue meds    Dizziness  Uncontrolled. Chronic dizziness for >3 years. Feel likely primarily due to pt's back and the balance and LE weakness issues which are being evaluated by neurosurgery. Was concerned for vascular etiology but r/o with recent angiogram showing good blood flow to brain. Pt also had head CT thus not concerned for brain tumor.     Ddx also includes medication SE, previous cerebellar stroke, acoustic neuroma. Feel medication SE less likely as pt taking meds as rx daily and medications she is taking do not have dizziness as common SE. Acoustic neuroma less likely as pt not having any unilateral or ALESHA hearing change. Do not feel MRI warranted at this time as less likely due to CVA and pt already on meds to lower risk of stroke except ASA. Pt started on daily baby aspirin. As pt already overwhelmed with current Dr appointments and procedures, will defer a full work up at this time. Discussed MDM with pt and she is in agreement with plan. Stress and adjustment reaction  Pt having stress around number of Dr appointments and procedures she's had done this summer. Reports ongoing anxiety around her back/LE issues since having back surgery in 2014. Pt is leaving for [place] in October and will be there through May. States her mood and outlook on life is much improved in [place] due to being able to swim and the social activities in her community. Pt instructed and in agreement with seeing a counselor/therapist down in [place] if her mood does not improve like it normally does within 1 month of arrival.     Stenosis of left subclavian artery ([address]  EMR notes related to angiogram and vascular surgery appointments reviewed and discussed with pt. Reviewed sx that warrant immediate f/u with vascular surgery, otherwise pt instructed to see vascular annually. DDD (degenerative disc disease), lumbar  Uncontrolled - pt seeing Dr. Argelia Naqvi for evaluation and management of sx. Scheduled for MRI next week. Discussed benefits of doing physical therapy for pt's pain, balance issues, and anxiety surrounding her decreased level of activity. Pt states she will not go right now due to too many recent Dr appointments. Pt in agreement with being physically active by swimming when she gets to [place]. Reports she will consider PT in the future.       Other orders  -     atorvastatin (LIPITOR) 40 MG tablet; Take 1 tablet by mouth daily  -     losartan (COZAAR) 50 MG tablet; Take 1 tablet by mouth daily  -     aspirin EC 81 MG EC tablet; Take 1 tablet by mouth daily          Return in about 33 weeks (around 5/15/2023) for AWV, 30 min.          Note per Christie Jones, MS3, with corrections and edits per Tiara Velazquez MD.  I agree with entirety of note and was present and performed history and physical.  I also confirm that the note above accurately reflects all work, treatment, procedures, and medical decision making performed by me, Tiara Velazquez MD

## 2022-10-03 ENCOUNTER — HOSPITAL ENCOUNTER (OUTPATIENT)
Dept: MRI IMAGING | Age: 67
Discharge: HOME | End: 2022-10-03
Payer: MEDICARE

## 2022-10-03 DIAGNOSIS — M54.16 LUMBAR RADICULOPATHY: ICD-10-CM

## 2022-10-03 PROCEDURE — 72148 MRI LUMBAR SPINE W/O DYE: CPT

## 2023-05-03 ENCOUNTER — TELEPHONE (OUTPATIENT)
Dept: FAMILY MEDICINE CLINIC | Age: 68
End: 2023-05-03

## 2023-05-03 DIAGNOSIS — Z87.891 PERSONAL HISTORY OF TOBACCO USE: ICD-10-CM

## 2023-05-03 DIAGNOSIS — E53.8 B12 DEFICIENCY: ICD-10-CM

## 2023-05-03 DIAGNOSIS — I10 ESSENTIAL HYPERTENSION: ICD-10-CM

## 2023-05-03 DIAGNOSIS — Z12.31 ENCOUNTER FOR SCREENING MAMMOGRAM FOR MALIGNANT NEOPLASM OF BREAST: Primary | ICD-10-CM

## 2023-05-03 DIAGNOSIS — R73.02 IGT (IMPAIRED GLUCOSE TOLERANCE): ICD-10-CM

## 2023-05-03 DIAGNOSIS — Z78.0 POSTMENOPAUSAL: ICD-10-CM

## 2023-05-03 NOTE — TELEPHONE ENCOUNTER
----- Message from Lex Majano, [address] sent at 5/3/2023  9:33 AM EDT -----  Subject: Referral Request    Reason for referral request? Patient would like to request a mammogram,   DEXA scan, yearly blood, and CT of lungs. Please mail orders to patient. Provider patient wants to be referred to(if known):     Provider Phone Number(if known):     Additional Information for Provider?   ---------------------------------------------------------------------------  --------------  [id number]    [phone]; OK to leave message on voicemail  ---------------------------------------------------------------------------  --------------

## 2023-05-04 NOTE — TELEPHONE ENCOUNTER
What blood work would you like? Other orders placed and pended, ok to sign if agreeable. Need Dx for Dexa?

## 2023-05-16 ENCOUNTER — OFFICE VISIT (OUTPATIENT)
Dept: FAMILY MEDICINE CLINIC | Age: 68
End: 2023-05-16

## 2023-05-16 VITALS — OXYGEN SATURATION: 95 % | WEIGHT: 233 LBS | HEART RATE: 109 BPM | BODY MASS INDEX: 38.77 KG/M2 | TEMPERATURE: 99.2 F

## 2023-05-16 DIAGNOSIS — J01.00 ACUTE NON-RECURRENT MAXILLARY SINUSITIS: Primary | ICD-10-CM

## 2023-05-16 RX ORDER — CEPHALEXIN 500 MG/1
500 CAPSULE ORAL 2 TIMES DAILY
Qty: 20 CAPSULE | Refills: 0 | Status: SHIPPED | OUTPATIENT
Start: 2023-05-16 | End: 2023-05-26

## 2023-05-16 SDOH — ECONOMIC STABILITY: FOOD INSECURITY: WITHIN THE PAST 12 MONTHS, THE FOOD YOU BOUGHT JUST DIDN'T LAST AND YOU DIDN'T HAVE MONEY TO GET MORE.: NEVER TRUE

## 2023-05-16 SDOH — ECONOMIC STABILITY: FOOD INSECURITY: WITHIN THE PAST 12 MONTHS, YOU WORRIED THAT YOUR FOOD WOULD RUN OUT BEFORE YOU GOT MONEY TO BUY MORE.: NEVER TRUE

## 2023-05-16 SDOH — ECONOMIC STABILITY: HOUSING INSECURITY
IN THE LAST 12 MONTHS, WAS THERE A TIME WHEN YOU DID NOT HAVE A STEADY PLACE TO SLEEP OR SLEPT IN A SHELTER (INCLUDING NOW)?: NO

## 2023-05-16 SDOH — ECONOMIC STABILITY: INCOME INSECURITY: HOW HARD IS IT FOR YOU TO PAY FOR THE VERY BASICS LIKE FOOD, HOUSING, MEDICAL CARE, AND HEATING?: NOT HARD AT ALL

## 2023-05-16 ASSESSMENT — PATIENT HEALTH QUESTIONNAIRE - PHQ9
8. MOVING OR SPEAKING SO SLOWLY THAT OTHER PEOPLE COULD HAVE NOTICED. OR THE OPPOSITE, BEING SO FIGETY OR RESTLESS THAT YOU HAVE BEEN MOVING AROUND A LOT MORE THAN USUAL: 0
6. FEELING BAD ABOUT YOURSELF - OR THAT YOU ARE A FAILURE OR HAVE LET YOURSELF OR YOUR FAMILY DOWN: 0
10. IF YOU CHECKED OFF ANY PROBLEMS, HOW DIFFICULT HAVE THESE PROBLEMS MADE IT FOR YOU TO DO YOUR WORK, TAKE CARE OF THINGS AT HOME, OR GET ALONG WITH OTHER PEOPLE: 0
SUM OF ALL RESPONSES TO PHQ QUESTIONS 1-9: 0
4. FEELING TIRED OR HAVING LITTLE ENERGY: 0
SUM OF ALL RESPONSES TO PHQ QUESTIONS 1-9: 0
1. LITTLE INTEREST OR PLEASURE IN DOING THINGS: 0
7. TROUBLE CONCENTRATING ON THINGS, SUCH AS READING THE NEWSPAPER OR WATCHING TELEVISION: 0
3. TROUBLE FALLING OR STAYING ASLEEP: 0
5. POOR APPETITE OR OVEREATING: 0
2. FEELING DOWN, DEPRESSED OR HOPELESS: 0
SUM OF ALL RESPONSES TO PHQ QUESTIONS 1-9: 0
SUM OF ALL RESPONSES TO PHQ QUESTIONS 1-9: 0
SUM OF ALL RESPONSES TO PHQ9 QUESTIONS 1 & 2: 0
9. THOUGHTS THAT YOU WOULD BE BETTER OFF DEAD, OR OF HURTING YOURSELF: 0

## 2023-05-16 NOTE — PROGRESS NOTES
Mohini England  : 1955  Encounter date: 2023    This angelina 69 y.o. female who presents with  No chief complaint on file. History of present illness:    HPI Pt is 69year old female with sinus pressure, congestion and pain started 3 days ago. Pt with teeth pain and R ear pain. Pt has taken OTC advil, low grade fever. R sided facial swelling. Pt reports being seen by dentist 4 days ago, negative exam, negative dental xrays. Current Outpatient Medications on File Prior to Visit   Medication Sig Dispense Refill    atorvastatin (LIPITOR) 40 MG tablet Take 1 tablet by mouth daily 90 tablet 3    losartan (COZAAR) 50 MG tablet Take 1 tablet by mouth daily 90 tablet 3    aspirin EC 81 MG EC tablet Take 1 tablet by mouth daily 90 tablet 3    venlafaxine (EFFEXOR XR) 37.5 MG extended release capsule Take 1 capsule by mouth in the morning. 90 capsule 3    Multiple Vitamins-Minerals (CENTRUM SILVER 50+WOMEN PO) Take by mouth       No current facility-administered medications on file prior to visit.       Allergies   Allergen Reactions    Codeine Other (See Comments)     Wasn't able to speak or move     Past Medical History:   Diagnosis Date    History of lung cancer     Hyperlipidemia     Hypertension       Past Surgical History:   Procedure Laterality Date    ANGIOPLASTY Left 2022    L subclavian, no stenting    BACK SURGERY  2014    spinal stenosis    LUNG SURGERY  2011    left VATS for stage IA adenocarcinoma      Family History   Problem Relation Age of Onset    Cancer Mother         thyroid    High Cholesterol Mother     Alcohol Abuse Father     COPD Sister     High Cholesterol Sister     No Known Problems Brother     No Known Problems Brother     Diabetes Maternal Grandfather       Social History     Tobacco Use    Smoking status: Every Day     Packs/day: 0.50     Years: 50.00     Pack years: 25.00     Types: Cigarettes     Start date: 1970    Smokeless tobacco: Never    Tobacco

## 2023-05-24 ENCOUNTER — HOSPITAL ENCOUNTER (OUTPATIENT)
Age: 68
Discharge: HOME | End: 2023-05-24
Payer: MEDICARE

## 2023-05-24 ENCOUNTER — HOSPITAL ENCOUNTER (OUTPATIENT)
Dept: WOMENS IMAGING | Age: 68
Discharge: HOME | End: 2023-05-24
Payer: MEDICARE

## 2023-05-24 ENCOUNTER — HOSPITAL ENCOUNTER (OUTPATIENT)
Dept: GENERAL RADIOLOGY | Age: 68
Discharge: HOME | End: 2023-05-24
Payer: MEDICARE

## 2023-05-24 VITALS — BODY MASS INDEX: 36.65 KG/M2 | HEIGHT: 65 IN | WEIGHT: 220 LBS

## 2023-05-24 DIAGNOSIS — E53.8 B12 DEFICIENCY: ICD-10-CM

## 2023-05-24 DIAGNOSIS — R73.02 IGT (IMPAIRED GLUCOSE TOLERANCE): ICD-10-CM

## 2023-05-24 DIAGNOSIS — Z78.0 POSTMENOPAUSAL: ICD-10-CM

## 2023-05-24 DIAGNOSIS — Z12.31 ENCOUNTER FOR SCREENING MAMMOGRAM FOR MALIGNANT NEOPLASM OF BREAST: ICD-10-CM

## 2023-05-24 DIAGNOSIS — I10 ESSENTIAL HYPERTENSION: ICD-10-CM

## 2023-05-24 LAB
ALBUMIN SERPL-MCNC: 4.4 G/DL (ref 3.4–5)
ALBUMIN/GLOB SERPL: 1.8 {RATIO} (ref 1.1–2.2)
ALP SERPL-CCNC: 91 U/L (ref 40–129)
ALT SERPL-CCNC: 25 U/L (ref 10–40)
ANION GAP SERPL CALCULATED.3IONS-SCNC: 10 MMOL/L (ref 3–16)
AST SERPL-CCNC: 20 U/L (ref 15–37)
BILIRUB SERPL-MCNC: 0.5 MG/DL (ref 0–1)
BUN SERPL-MCNC: 13 MG/DL (ref 7–20)
CALCIUM SERPL-MCNC: 9.7 MG/DL (ref 8.3–10.6)
CHLORIDE SERPL-SCNC: 103 MMOL/L (ref 99–110)
CHOLEST SERPL-MCNC: 159 MG/DL (ref 0–199)
CO2 SERPL-SCNC: 30 MMOL/L (ref 21–32)
CREAT SERPL-MCNC: 0.6 MG/DL (ref 0.6–1.2)
DEPRECATED RDW RBC AUTO: 13.7 % (ref 12.4–15.4)
GFR SERPLBLD CREATININE-BSD FMLA CKD-EPI: >60 ML/MIN/{1.73_M2}
GLUCOSE SERPL-MCNC: 102 MG/DL (ref 70–99)
HCT VFR BLD AUTO: 44.7 % (ref 36–48)
HDLC SERPL-MCNC: 52 MG/DL (ref 40–60)
HGB BLD-MCNC: 15.1 G/DL (ref 12–16)
LDLC SERPL CALC-MCNC: 81 MG/DL
MCH RBC QN AUTO: 31.7 PG (ref 26–34)
MCHC RBC AUTO-ENTMCNC: 33.7 G/DL (ref 31–36)
MCV RBC AUTO: 93.9 FL (ref 80–100)
PLATELET # BLD AUTO: 328 K/UL (ref 135–450)
PMV BLD AUTO: 8.5 FL (ref 5–10.5)
POTASSIUM SERPL-SCNC: 4.1 MMOL/L (ref 3.5–5.1)
PROT SERPL-MCNC: 6.9 G/DL (ref 6.4–8.2)
RBC # BLD AUTO: 4.76 M/UL (ref 4–5.2)
SODIUM SERPL-SCNC: 143 MMOL/L (ref 136–145)
TRIGL SERPL-MCNC: 130 MG/DL (ref 0–150)
VIT B12 SERPL-MCNC: 764 PG/ML (ref 211–911)
VLDLC SERPL CALC-MCNC: 26 MG/DL
WBC # BLD AUTO: 9.5 K/UL (ref 4–11)

## 2023-05-24 PROCEDURE — 77063 BREAST TOMOSYNTHESIS BI: CPT

## 2023-05-24 PROCEDURE — 83036 HEMOGLOBIN GLYCOSYLATED A1C: CPT

## 2023-05-24 PROCEDURE — 36415 COLL VENOUS BLD VENIPUNCTURE: CPT

## 2023-05-24 PROCEDURE — 85027 COMPLETE CBC AUTOMATED: CPT

## 2023-05-24 PROCEDURE — 80061 LIPID PANEL: CPT

## 2023-05-24 PROCEDURE — 80053 COMPREHEN METABOLIC PANEL: CPT

## 2023-05-24 PROCEDURE — 82607 VITAMIN B-12: CPT

## 2023-05-24 PROCEDURE — 77080 DXA BONE DENSITY AXIAL: CPT

## 2023-05-25 LAB
EST. AVERAGE GLUCOSE BLD GHB EST-MCNC: 137 MG/DL
HBA1C MFR BLD: 6.4 %

## 2023-06-05 NOTE — PROGRESS NOTES
[facility] VISIT    Patient is here for their Medicare Annual Wellness Visit no concerns. Here to f/u HTN, chol, and recent testing. Last eye exam: a yr ago July 2023  Last dental exam: a month ago, goes every 6 months  Exercise: yard work, gardening, housework and pool exercises when in [facility]  Diet: in general, a \"healthy\" diet  , on average, 2 meals per day, not really watching carbs    How would you rate your overall health? : Good        Fall Risk 6/6/2023 5/4/2022 5/4/2021 8/18/2020 7/17/2019   2 or more falls in past year? no no no no yes   Fall with injury in past year? no no no no no       PHQ Scores 6/6/2023 5/16/2023 5/4/2022 8/23/2021 5/4/2021 8/18/2020 7/17/2019   PHQ2 Score 2 0 0 0 0 0 0   PHQ9 Score 2 0 0 0 0 0 0         Do you always wear a seat belt in the car?: Yes      Have you noted any problems with hearing?: No  Have you noted any vision problems?: No  Do you have concerns about your sexual health?: no  In the past month how much has pain been an issue for you?:  Very Much- has appt with Dr. Cinthia Soto at [facility] for back, has CT scheduled next week, thinking about epidural injections  In the past month have you had issues with anxiety, loneliness, irritability or fatigue:  Not at all    Do you take opioid medications even sometimes? No     Living Will: Yes,   Copy on file      Healthcare Decision Maker:    Primary Decision Maker: Tho Aparicio - [phone]    Secondary Decision Maker: Beau Pa - Child  Click here to complete Healthcare Decision Makers including selection of the Healthcare Decision Maker Relationship (ie \"Primary\"). Today we documented Decision Maker(s) consistent with ACP documents on file. Who lives at home with you:   Do you have any pets? none  Do you have any services coming to your home (meals on wheels, home health, etc) ? : no      Do you need help with:  Using the phone:  No  Bathing: No  Dressing:  No  Toileting:

## 2023-06-06 ENCOUNTER — OFFICE VISIT (OUTPATIENT)
Dept: FAMILY MEDICINE CLINIC | Age: 68
End: 2023-06-06

## 2023-06-06 VITALS
WEIGHT: 231.6 LBS | SYSTOLIC BLOOD PRESSURE: 138 MMHG | TEMPERATURE: 97.6 F | HEIGHT: 65 IN | HEART RATE: 78 BPM | BODY MASS INDEX: 38.59 KG/M2 | DIASTOLIC BLOOD PRESSURE: 88 MMHG | OXYGEN SATURATION: 98 %

## 2023-06-06 DIAGNOSIS — E53.8 B12 DEFICIENCY: ICD-10-CM

## 2023-06-06 DIAGNOSIS — I65.22 LEFT CAROTID STENOSIS: ICD-10-CM

## 2023-06-06 DIAGNOSIS — Z00.00 WELL ADULT EXAM: Primary | ICD-10-CM

## 2023-06-06 DIAGNOSIS — F41.9 ANXIETY: ICD-10-CM

## 2023-06-06 DIAGNOSIS — R73.02 IGT (IMPAIRED GLUCOSE TOLERANCE): ICD-10-CM

## 2023-06-06 DIAGNOSIS — I10 ESSENTIAL HYPERTENSION: ICD-10-CM

## 2023-06-06 DIAGNOSIS — M85.80 OSTEOPENIA, UNSPECIFIED LOCATION: ICD-10-CM

## 2023-06-06 DIAGNOSIS — I77.1 STENOSIS OF LEFT SUBCLAVIAN ARTERY (HCC): ICD-10-CM

## 2023-06-06 DIAGNOSIS — E66.09 CLASS 2 OBESITY DUE TO EXCESS CALORIES WITHOUT SERIOUS COMORBIDITY WITH BODY MASS INDEX (BMI) OF 39.0 TO 39.9 IN ADULT: ICD-10-CM

## 2023-06-06 PROBLEM — E66.01 SEVERE OBESITY (BMI 35.0-39.9) WITH COMORBIDITY (HCC): Status: RESOLVED | Noted: 2023-06-06 | Resolved: 2023-06-06

## 2023-06-06 PROBLEM — E66.01 SEVERE OBESITY (BMI 35.0-39.9) WITH COMORBIDITY (HCC): Status: ACTIVE | Noted: 2023-06-06

## 2023-06-06 RX ORDER — ALENDRONATE SODIUM 70 MG/1
70 TABLET ORAL
Qty: 12 TABLET | Refills: 3 | Status: CANCELLED | OUTPATIENT
Start: 2023-06-06

## 2023-06-06 RX ORDER — VENLAFAXINE HYDROCHLORIDE 37.5 MG/1
37.5 CAPSULE, EXTENDED RELEASE ORAL DAILY
Qty: 90 CAPSULE | Refills: 3 | Status: SHIPPED | OUTPATIENT
Start: 2023-06-06

## 2023-06-06 RX ORDER — LOSARTAN POTASSIUM 50 MG/1
50 TABLET ORAL DAILY
Qty: 90 TABLET | Refills: 3 | Status: SHIPPED | OUTPATIENT
Start: 2023-06-06

## 2023-06-06 RX ORDER — ATORVASTATIN CALCIUM 40 MG/1
40 TABLET, FILM COATED ORAL DAILY
Qty: 90 TABLET | Refills: 3 | Status: SHIPPED | OUTPATIENT
Start: 2023-06-06

## 2023-06-06 ASSESSMENT — PATIENT HEALTH QUESTIONNAIRE - PHQ9
SUM OF ALL RESPONSES TO PHQ QUESTIONS 1-9: 2
SUM OF ALL RESPONSES TO PHQ9 QUESTIONS 1 & 2: 2
SUM OF ALL RESPONSES TO PHQ QUESTIONS 1-9: 2
1. LITTLE INTEREST OR PLEASURE IN DOING THINGS: 1
SUM OF ALL RESPONSES TO PHQ QUESTIONS 1-9: 2
SUM OF ALL RESPONSES TO PHQ QUESTIONS 1-9: 2
2. FEELING DOWN, DEPRESSED OR HOPELESS: 1

## 2023-06-06 NOTE — PATIENT INSTRUCTIONS
--Advised to take calcium 1000-1200mg daily including dietary sources. Advised needs 8407-4993 units of vitamin D daily.

## 2023-06-18 ENCOUNTER — HOSPITAL ENCOUNTER (OUTPATIENT)
Dept: CT IMAGING | Age: 68
Discharge: HOME | End: 2023-06-18
Payer: MEDICARE

## 2023-06-18 DIAGNOSIS — M54.16 LUMBAR RADICULOPATHY: ICD-10-CM

## 2023-06-18 PROCEDURE — 72131 CT LUMBAR SPINE W/O DYE: CPT

## 2023-07-24 ENCOUNTER — HOSPITAL ENCOUNTER (OUTPATIENT)
Dept: CT IMAGING | Age: 68
Discharge: HOME | End: 2023-07-24
Attending: FAMILY MEDICINE
Payer: MEDICARE

## 2023-07-24 ENCOUNTER — HOSPITAL ENCOUNTER (OUTPATIENT)
Dept: VASCULAR LAB | Age: 68
Discharge: HOME | End: 2023-07-24
Payer: MEDICARE

## 2023-07-24 DIAGNOSIS — I77.1 STENOSIS OF LEFT SUBCLAVIAN ARTERY (HCC): ICD-10-CM

## 2023-07-24 DIAGNOSIS — I65.22 LEFT CAROTID STENOSIS: ICD-10-CM

## 2023-07-24 DIAGNOSIS — Z87.891 PERSONAL HISTORY OF TOBACCO USE: ICD-10-CM

## 2023-07-24 PROCEDURE — 71271 CT THORAX LUNG CANCER SCR C-: CPT

## 2023-07-24 PROCEDURE — 93880 EXTRACRANIAL BILAT STUDY: CPT

## 2023-09-07 RX ORDER — VENLAFAXINE HYDROCHLORIDE 37.5 MG/1
37.5 CAPSULE, EXTENDED RELEASE ORAL DAILY
Qty: 90 CAPSULE | Refills: 0 | Status: SHIPPED | OUTPATIENT
Start: 2023-09-07

## 2023-10-06 ENCOUNTER — OFFICE VISIT (OUTPATIENT)
Dept: FAMILY MEDICINE CLINIC | Age: 68
End: 2023-10-06
Payer: MEDICARE

## 2023-10-06 VITALS
TEMPERATURE: 98.2 F | WEIGHT: 233 LBS | OXYGEN SATURATION: 97 % | HEART RATE: 72 BPM | BODY MASS INDEX: 39.38 KG/M2 | SYSTOLIC BLOOD PRESSURE: 144 MMHG | DIASTOLIC BLOOD PRESSURE: 92 MMHG

## 2023-10-06 DIAGNOSIS — R73.02 IGT (IMPAIRED GLUCOSE TOLERANCE): Primary | ICD-10-CM

## 2023-10-06 DIAGNOSIS — M85.80 OSTEOPENIA, UNSPECIFIED LOCATION: ICD-10-CM

## 2023-10-06 DIAGNOSIS — F41.9 ANXIETY: ICD-10-CM

## 2023-10-06 LAB — HBA1C MFR BLD: 6.1 %

## 2023-10-06 PROCEDURE — 1123F ACP DISCUSS/DSCN MKR DOCD: CPT | Performed by: FAMILY MEDICINE

## 2023-10-06 PROCEDURE — 3074F SYST BP LT 130 MM HG: CPT | Performed by: FAMILY MEDICINE

## 2023-10-06 PROCEDURE — 83036 HEMOGLOBIN GLYCOSYLATED A1C: CPT | Performed by: FAMILY MEDICINE

## 2023-10-06 PROCEDURE — G8399 PT W/DXA RESULTS DOCUMENT: HCPCS | Performed by: FAMILY MEDICINE

## 2023-10-06 PROCEDURE — G8427 DOCREV CUR MEDS BY ELIG CLIN: HCPCS | Performed by: FAMILY MEDICINE

## 2023-10-06 PROCEDURE — 3078F DIAST BP <80 MM HG: CPT | Performed by: FAMILY MEDICINE

## 2023-10-06 PROCEDURE — G8417 CALC BMI ABV UP PARAM F/U: HCPCS | Performed by: FAMILY MEDICINE

## 2023-10-06 PROCEDURE — 1090F PRES/ABSN URINE INCON ASSESS: CPT | Performed by: FAMILY MEDICINE

## 2023-10-06 PROCEDURE — 3017F COLORECTAL CA SCREEN DOC REV: CPT | Performed by: FAMILY MEDICINE

## 2023-10-06 PROCEDURE — 4004F PT TOBACCO SCREEN RCVD TLK: CPT | Performed by: FAMILY MEDICINE

## 2023-10-06 PROCEDURE — 99214 OFFICE O/P EST MOD 30 MIN: CPT | Performed by: FAMILY MEDICINE

## 2023-10-06 PROCEDURE — G8484 FLU IMMUNIZE NO ADMIN: HCPCS | Performed by: FAMILY MEDICINE

## 2023-10-06 RX ORDER — DIAZEPAM 2 MG/1
2 TABLET ORAL EVERY 12 HOURS PRN
Qty: 6 TABLET | Refills: 0 | Status: SHIPPED | OUTPATIENT
Start: 2023-10-06 | End: 2023-10-13

## 2023-10-06 RX ORDER — VENLAFAXINE HYDROCHLORIDE 37.5 MG/1
37.5 CAPSULE, EXTENDED RELEASE ORAL DAILY
Qty: 90 CAPSULE | Refills: 3 | Status: SHIPPED | OUTPATIENT
Start: 2023-10-06

## 2023-10-06 NOTE — PATIENT INSTRUCTIONS
--Advised to take calcium 1000-1200mg daily including dietary sources. Advised needs 9630-4561 units of vitamin D daily. Monitor blood pressure 2-3 times a week and drop off readings for review in 1 month. Goal BP is <140/80 for many people, good control is <130/80    Need a new cuff?  Check this website to make sure your BP cuff has been validated for accuracy:    www.validatebp.org

## 2024-06-07 NOTE — PROGRESS NOTES
months, you worried that your food would run out before you got the money to buy more. Never true  Never true Never true    Within the past 12 months, the food you bought just didn't last and you didn't have money to get more. Never true  Never true Never true    Q1: How often do you have a drink containing alcohol?  Never   Never   Q2: How many drinks containing alcohol do you have on a typical day when you are drinking?  None   Pt Declined   Q3: How often do you have six or more drinks on one occasion?  Never   Never           Care Team:  Patient's list of care team members was updated in EHR under the Snap Shot.   Dentist: Dr. Alicea  Eye: [facility]    Immunizations: Reviewed with patient.     Health Maintenance Due   Topic Date Due    Respiratory Syncytial Virus (RSV) Pregnant or age 60 yrs+ (1 - 1-dose 60+ series) Never done    COVID-19 Vaccine (4 - 2023-24 season) 09/01/2023    Lipids  05/24/2024       CHRONIC CONDITIONS / ACUTE COMPLAINTS      Nilda Gamboa is a 68 y.o. female who presents for follow-up of HTN.    Checks BP at home: No  BP numbers: NA  Taking medication daily: Yes (losartan)  Side Effects of medication: no      Chol - taking lipitor daily, no SE      Anxiety - taking effexor daily. States feels very flat. States normally likes to do a lot of stuff. No ambition. Doesn't feel like herself. Has gained weight on it as well. States has felt like that for past year or so. Getting ready to have back surgery again. Frustrated can't be as active due to back. Frustrated when she sees others her age doing things she used to be able to do. Worried about having surgery as last time she had back surgery didn't turn out as well as she hoped but feels she is stuck because back is so bad.           Physical Exam:    Body mass index is 39.9 kg/m².  Vitals:    06/10/24 0858   BP: (!) 142/100   Site: Left Upper Arm   Position: Sitting   Cuff Size: Large Adult   Pulse: 82   Temp: 97.1 °F (36.2 °C)   TempSrc:

## 2024-06-10 ENCOUNTER — OFFICE VISIT (OUTPATIENT)
Dept: FAMILY MEDICINE CLINIC | Age: 69
End: 2024-06-10
Payer: MEDICARE

## 2024-06-10 VITALS
OXYGEN SATURATION: 98 % | HEIGHT: 65 IN | WEIGHT: 239.8 LBS | DIASTOLIC BLOOD PRESSURE: 100 MMHG | HEART RATE: 82 BPM | BODY MASS INDEX: 39.95 KG/M2 | TEMPERATURE: 97.1 F | SYSTOLIC BLOOD PRESSURE: 142 MMHG

## 2024-06-10 DIAGNOSIS — J43.2 CENTRILOBULAR EMPHYSEMA (HCC): ICD-10-CM

## 2024-06-10 DIAGNOSIS — R73.02 IGT (IMPAIRED GLUCOSE TOLERANCE): ICD-10-CM

## 2024-06-10 DIAGNOSIS — F32.1 CURRENT MODERATE EPISODE OF MAJOR DEPRESSIVE DISORDER WITHOUT PRIOR EPISODE (HCC): ICD-10-CM

## 2024-06-10 DIAGNOSIS — Z00.00 WELL ADULT EXAM: Primary | ICD-10-CM

## 2024-06-10 DIAGNOSIS — I10 ESSENTIAL HYPERTENSION: ICD-10-CM

## 2024-06-10 DIAGNOSIS — Z87.891 PERSONAL HISTORY OF TOBACCO USE: ICD-10-CM

## 2024-06-10 DIAGNOSIS — I77.1 STENOSIS OF LEFT SUBCLAVIAN ARTERY (HCC): ICD-10-CM

## 2024-06-10 DIAGNOSIS — I65.22 LEFT CAROTID STENOSIS: ICD-10-CM

## 2024-06-10 DIAGNOSIS — F41.9 ANXIETY: ICD-10-CM

## 2024-06-10 DIAGNOSIS — E78.49 OTHER HYPERLIPIDEMIA: ICD-10-CM

## 2024-06-10 DIAGNOSIS — E53.8 B12 DEFICIENCY: ICD-10-CM

## 2024-06-10 PROCEDURE — G8427 DOCREV CUR MEDS BY ELIG CLIN: HCPCS | Performed by: FAMILY MEDICINE

## 2024-06-10 PROCEDURE — 3023F SPIROM DOC REV: CPT | Performed by: FAMILY MEDICINE

## 2024-06-10 PROCEDURE — G0439 PPPS, SUBSEQ VISIT: HCPCS | Performed by: FAMILY MEDICINE

## 2024-06-10 PROCEDURE — 4004F PT TOBACCO SCREEN RCVD TLK: CPT | Performed by: FAMILY MEDICINE

## 2024-06-10 PROCEDURE — 1090F PRES/ABSN URINE INCON ASSESS: CPT | Performed by: FAMILY MEDICINE

## 2024-06-10 PROCEDURE — 99214 OFFICE O/P EST MOD 30 MIN: CPT | Performed by: FAMILY MEDICINE

## 2024-06-10 PROCEDURE — 3077F SYST BP >= 140 MM HG: CPT | Performed by: FAMILY MEDICINE

## 2024-06-10 PROCEDURE — 1123F ACP DISCUSS/DSCN MKR DOCD: CPT | Performed by: FAMILY MEDICINE

## 2024-06-10 PROCEDURE — 3017F COLORECTAL CA SCREEN DOC REV: CPT | Performed by: FAMILY MEDICINE

## 2024-06-10 PROCEDURE — G8399 PT W/DXA RESULTS DOCUMENT: HCPCS | Performed by: FAMILY MEDICINE

## 2024-06-10 PROCEDURE — G8417 CALC BMI ABV UP PARAM F/U: HCPCS | Performed by: FAMILY MEDICINE

## 2024-06-10 PROCEDURE — 3080F DIAST BP >= 90 MM HG: CPT | Performed by: FAMILY MEDICINE

## 2024-06-10 PROCEDURE — G0296 VISIT TO DETERM LDCT ELIG: HCPCS | Performed by: FAMILY MEDICINE

## 2024-06-10 RX ORDER — DULOXETIN HYDROCHLORIDE 60 MG/1
60 CAPSULE, DELAYED RELEASE ORAL DAILY
Qty: 30 CAPSULE | Refills: 5 | Status: SHIPPED | OUTPATIENT
Start: 2024-06-10

## 2024-06-10 RX ORDER — ATORVASTATIN CALCIUM 40 MG/1
40 TABLET, FILM COATED ORAL DAILY
Qty: 90 TABLET | Refills: 3 | Status: SHIPPED | OUTPATIENT
Start: 2024-06-10

## 2024-06-10 RX ORDER — METOPROLOL SUCCINATE 25 MG/1
25 TABLET, EXTENDED RELEASE ORAL DAILY
Qty: 30 TABLET | Refills: 3 | Status: SHIPPED | OUTPATIENT
Start: 2024-06-10

## 2024-06-10 RX ORDER — LOSARTAN POTASSIUM 50 MG/1
50 TABLET ORAL DAILY
Qty: 90 TABLET | Refills: 3 | Status: SHIPPED | OUTPATIENT
Start: 2024-06-10

## 2024-06-10 SDOH — ECONOMIC STABILITY: FOOD INSECURITY: WITHIN THE PAST 12 MONTHS, YOU WORRIED THAT YOUR FOOD WOULD RUN OUT BEFORE YOU GOT MONEY TO BUY MORE.: NEVER TRUE

## 2024-06-10 SDOH — HEALTH STABILITY: PHYSICAL HEALTH: ON AVERAGE, HOW MANY DAYS PER WEEK DO YOU ENGAGE IN MODERATE TO STRENUOUS EXERCISE (LIKE A BRISK WALK)?: 0 DAYS

## 2024-06-10 SDOH — ECONOMIC STABILITY: FOOD INSECURITY: WITHIN THE PAST 12 MONTHS, THE FOOD YOU BOUGHT JUST DIDN'T LAST AND YOU DIDN'T HAVE MONEY TO GET MORE.: NEVER TRUE

## 2024-06-10 SDOH — ECONOMIC STABILITY: TRANSPORTATION INSECURITY
IN THE PAST 12 MONTHS, HAS LACK OF TRANSPORTATION KEPT YOU FROM MEETINGS, WORK, OR FROM GETTING THINGS NEEDED FOR DAILY LIVING?: NO

## 2024-06-10 SDOH — ECONOMIC STABILITY: INCOME INSECURITY: HOW HARD IS IT FOR YOU TO PAY FOR THE VERY BASICS LIKE FOOD, HOUSING, MEDICAL CARE, AND HEATING?: NOT HARD AT ALL

## 2024-06-10 ASSESSMENT — LIFESTYLE VARIABLES
HOW OFTEN DO YOU HAVE A DRINK CONTAINING ALCOHOL: 1
HOW OFTEN DO YOU HAVE SIX OR MORE DRINKS ON ONE OCCASION: 1
HOW MANY STANDARD DRINKS CONTAINING ALCOHOL DO YOU HAVE ON A TYPICAL DAY: 0
HOW OFTEN DO YOU HAVE A DRINK CONTAINING ALCOHOL: NEVER
HOW MANY STANDARD DRINKS CONTAINING ALCOHOL DO YOU HAVE ON A TYPICAL DAY: PATIENT DOES NOT DRINK

## 2024-06-10 ASSESSMENT — PATIENT HEALTH QUESTIONNAIRE - PHQ9
5. POOR APPETITE OR OVEREATING: MORE THAN HALF THE DAYS
9. THOUGHTS THAT YOU WOULD BE BETTER OFF DEAD, OR OF HURTING YOURSELF: NOT AT ALL
SUM OF ALL RESPONSES TO PHQ QUESTIONS 1-9: 10
10. IF YOU CHECKED OFF ANY PROBLEMS, HOW DIFFICULT HAVE THESE PROBLEMS MADE IT FOR YOU TO DO YOUR WORK, TAKE CARE OF THINGS AT HOME, OR GET ALONG WITH OTHER PEOPLE: SOMEWHAT DIFFICULT
8. MOVING OR SPEAKING SO SLOWLY THAT OTHER PEOPLE COULD HAVE NOTICED. OR THE OPPOSITE, BEING SO FIGETY OR RESTLESS THAT YOU HAVE BEEN MOVING AROUND A LOT MORE THAN USUAL: NOT AT ALL
3. TROUBLE FALLING OR STAYING ASLEEP: MORE THAN HALF THE DAYS
SUM OF ALL RESPONSES TO PHQ QUESTIONS 1-9: 10
SUM OF ALL RESPONSES TO PHQ QUESTIONS 1-9: 10
2. FEELING DOWN, DEPRESSED OR HOPELESS: SEVERAL DAYS
4. FEELING TIRED OR HAVING LITTLE ENERGY: MORE THAN HALF THE DAYS
6. FEELING BAD ABOUT YOURSELF - OR THAT YOU ARE A FAILURE OR HAVE LET YOURSELF OR YOUR FAMILY DOWN: SEVERAL DAYS
7. TROUBLE CONCENTRATING ON THINGS, SUCH AS READING THE NEWSPAPER OR WATCHING TELEVISION: NOT AT ALL
SUM OF ALL RESPONSES TO PHQ QUESTIONS 1-9: 10
1. LITTLE INTEREST OR PLEASURE IN DOING THINGS: MORE THAN HALF THE DAYS
SUM OF ALL RESPONSES TO PHQ9 QUESTIONS 1 & 2: 3

## 2024-06-10 NOTE — PATIENT INSTRUCTIONS
--Get your Covid vaccine this fall.      --Make sure you get your flu shot this fall. If you are over 65 look for the \"high dose\" flu shot for better protection.     -Overlap the venlafexine with the duloxetine for 1 week then stop the venlafexine.     Monitor blood pressure 2-3 times a week and drop off readings for review in next visit    Goal BP is <140/80 for many people, good control is <130/80    Need a new cuff? Check this website to make sure your BP cuff has been validated for accuracy:    www.validatebp.org             Learning About Lung Cancer Screening  What is screening for lung cancer?     Lung cancer screening is a way to find some lung cancers early, before a person has any symptoms of the cancer.  Lung cancer screening may help those who have the highest risk for lung cancer--people age 50 and older who are or were heavy smokers. For most people, who aren't at increased risk, screening for lung cancer probably isn't helpful.  Screening won't prevent cancer. And it may not find all lung cancers. Lung cancer screening may lower the risk of dying from lung cancer in a small number of people.  How is it done?  Lung cancer screening is done with a low-dose CT (computed tomography) scan. A CT scan uses X-rays, or radiation, to make detailed pictures of your body. Experts recommend that screening be done in medical centers that focus on finding and treating lung cancer.  Who is screening recommended for?  Lung cancer screening is recommended for people age 50 and older who are or were heavy smokers. That means people with a smoking history of at least 20 pack years. A pack year is a way to measure how heavy a smoker you are or were.  To figure out your pack years, multiply how many packs a day on average (assuming 20 cigarettes per pack) you have smoked by how many years you have smoked. For example:  If you smoked 1 pack a day for 20 years, that's 1 times 20. So you have a smoking history of 20 pack

## 2024-06-14 LAB
CHOLESTEROL, TOTAL: 149 MG/DL
ESTIMATED AVERAGE GLUCOSE: 134 MG/DL
HBA1C MFR BLD: 6.3 % (ref 4.2–5.6)
HDLC SERPL-MCNC: 61 MG/DL
LDL CHOLESTEROL: 67 MG/DL
NONHDLC SERPL-MCNC: 88 MG/DL
TRIGL SERPL-MCNC: 107 MG/DL
VITAMIN B-12: 352 PG/ML (ref 180–914)

## 2024-06-25 NOTE — PROGRESS NOTES
Preoperative Consultation    Chief Complaint   Patient presents with    Pre-op Exam       This patient presents to the office today for a preoperative consultation at the request of surgeon, Dr. Piter Mckinnon, who plans on performing L2-4 Lateral Interbody Fusion on July 7 at [facility].      Planned anesthesia: General   Known anesthesia problems: None,   Family history of anesthesia problems: no  Bleeding risk: No recent or remote history of abnormal bleeding  Personal or FH of DVT/PE: No        Patient Active Problem List   Diagnosis    Other hyperlipidemia    Essential hypertension    Tobacco abuse    B12 deficiency    Folate deficiency    Class 2 obesity due to excess calories without serious comorbidity with body mass index (BMI) of 39.0 to 39.9 in adult    Menopausal symptoms    Anxiety    IGT (impaired glucose tolerance)    Syncope    Stenosis of left subclavian artery (HCC)    Chronic low back pain    Atherosclerosis of native arteries of extremities with intermittent claudication, other extremity (HCC)    Current moderate episode of major depressive disorder without prior episode (HCC)       Past Medical History:   Diagnosis Date    History of lung cancer     Hyperlipidemia     Hypertension      Past Surgical History:   Procedure Laterality Date    ANGIOPLASTY Left 06/06/2022    L subclavian, no stenting    BACK SURGERY  2014    spinal stenosis    CATARACT EXTRACTION Bilateral 07/2022    LUNG SURGERY  2011    left VATS for stage IA adenocarcinoma     Family History   Problem Relation Age of Onset    Cancer Mother         thyroid    High Cholesterol Mother     Alcohol Abuse Father     COPD Sister     Cancer Sister 71        Bone cancer ?CLL    High Cholesterol Sister     High Cholesterol Brother     High Cholesterol Brother     Diabetes Maternal Grandfather     No Known Problems Daughter        Allergies   Allergen Reactions    Codeine Other (See Comments)     Wasn't able to speak or move     Outpatient

## 2024-06-26 ENCOUNTER — OFFICE VISIT (OUTPATIENT)
Dept: FAMILY MEDICINE CLINIC | Age: 69
End: 2024-06-26

## 2024-06-26 VITALS
OXYGEN SATURATION: 98 % | SYSTOLIC BLOOD PRESSURE: 148 MMHG | HEART RATE: 71 BPM | BODY MASS INDEX: 39.27 KG/M2 | TEMPERATURE: 97.1 F | DIASTOLIC BLOOD PRESSURE: 92 MMHG | WEIGHT: 236 LBS

## 2024-06-26 DIAGNOSIS — I10 ESSENTIAL HYPERTENSION: ICD-10-CM

## 2024-06-26 DIAGNOSIS — M54.16 LUMBAR RADICULOPATHY: Primary | ICD-10-CM

## 2024-06-26 DIAGNOSIS — Z01.818 PREOP EXAMINATION: ICD-10-CM

## 2024-06-26 RX ORDER — DULOXETIN HYDROCHLORIDE 60 MG/1
60 CAPSULE, DELAYED RELEASE ORAL DAILY
Qty: 90 CAPSULE | Refills: 1 | Status: SHIPPED | OUTPATIENT
Start: 2024-06-26

## 2024-06-26 RX ORDER — AMLODIPINE BESYLATE 5 MG/1
5 TABLET ORAL DAILY
Qty: 30 TABLET | Refills: 1 | Status: SHIPPED | OUTPATIENT
Start: 2024-06-26

## 2024-06-26 RX ORDER — LOSARTAN POTASSIUM 100 MG/1
100 TABLET ORAL DAILY
Qty: 90 TABLET | Refills: 3 | Status: SHIPPED | OUTPATIENT
Start: 2024-06-26

## 2024-06-26 RX ORDER — METOPROLOL SUCCINATE 25 MG/1
25 TABLET, EXTENDED RELEASE ORAL DAILY
Qty: 90 TABLET | Refills: 1 | Status: SHIPPED | OUTPATIENT
Start: 2024-06-26

## 2024-06-26 NOTE — PATIENT INSTRUCTIONS
--Morning of surgery only take losartan and metoprolol.          Monitor blood pressure daily with pulses and drop off readings for review in 1 week.   Goal BP is <140/80 for many people, good control is <130/80    Need a new cuff? Check this website to make sure your BP cuff has been validated for accuracy:    www.validatebp.org

## 2024-07-03 ENCOUNTER — TELEPHONE (OUTPATIENT)
Dept: FAMILY MEDICINE CLINIC | Age: 69
End: 2024-07-03

## 2024-07-03 NOTE — TELEPHONE ENCOUNTER
Samantha from pre surgery department called to clarify if pt is clear for surgery or not. On 6/26 visit notes, it say pt was put on amlodipine after having high reading of BP so she just want to get clarification.       Sofia  [phone]

## 2024-07-03 NOTE — TELEPHONE ENCOUNTER
Yes, she is stable for surgery. Her bp is little high so I adjusted meds but nothing that would keep her from having surgery

## 2024-07-15 RX ORDER — ATORVASTATIN CALCIUM 40 MG/1
40 TABLET, FILM COATED ORAL DAILY
Qty: 90 TABLET | Refills: 3 | Status: SHIPPED | OUTPATIENT
Start: 2024-07-15

## 2024-07-15 RX ORDER — LOSARTAN POTASSIUM 100 MG/1
100 TABLET ORAL DAILY
Qty: 90 TABLET | Refills: 3 | Status: SHIPPED | OUTPATIENT
Start: 2024-07-15

## 2024-07-15 NOTE — TELEPHONE ENCOUNTER
Medication:   Requested Prescriptions      No prescriptions requested or ordered in this encounter      Different pharmacy than where last Rx was sent.    Patient Phone Number: [phone] (home)     Last appt: 6/26/2024   Next appt: 8/12/2024    Last OARRS:       10/6/2023    10:04 AM   RX Monitoring   Periodic Controlled Substance Monitoring No signs of potential drug abuse or diversion identified.     PDMP Monitoring:    Last PDMP [place] as Reviewed ([place]):  Review User Review Instant Review Result   MARIZA PERDOMO 10/6/2023 10:04 AM Reviewed PDMP [1]     Preferred Pharmacy:   [id number] [place], [place] - [address] - P [phone] - F [phone]  [address] [facility] [address]  Phone: [phone] Fax: [phone]    [id number] [place], [place] - [address] - P [phone] - F [phone]  [facility] [address] [facility] [address]  Phone: [phone] Fax: [phone]

## 2024-08-09 NOTE — PROGRESS NOTES
Nilda Gamboa is a 68 y.o. female who presents for follow-up of HTN.    Checks BP at home: Yes  BP numbers: since spinal surgery, it has been running good again 128/72 - 124/65  Taking medication daily: Yes (amlodipine, losartan, and metoprolol)  Side Effects of medication: no      Spine surgery went very well, pain much improved, able to be more active.      Mood - sig improved since surgery      Wonders about trying naltrexone/bupropion, has script  couldn't tolerate and wonders about trying.        Body mass index is 38.94 kg/m².    Vitals:    08/12/24 0956   BP: 130/80   Site: Left Upper Arm   Position: Sitting   Cuff Size: Large Adult   Pulse: 74   Temp: 97.2 °F (36.2 °C)   TempSrc: Infrared   SpO2: (!) 88%   Weight: 106.1 kg (234 lb)     Wt Readings from Last 3 Encounters:   08/12/24 106.1 kg (234 lb)   06/26/24 107 kg (236 lb)   06/10/24 108.8 kg (239 lb 12.8 oz)         6/10/2024     7:12 AM 6/6/2023     9:32 AM 5/16/2023     2:35 PM 5/4/2022     5:02 PM 8/23/2021     2:32 PM 5/4/2021     9:47 AM 8/18/2020     9:15 AM   PHQ Scores   PHQ2 Score 3 2 0 0 0 0 0   PHQ9 Score 10 2 0 0 0 0 0       Interpretation of Total Score Depression Severity: 1-4 = Minimal depression, 5-9 = Mild depression, 10-14 = Moderate depression, 15-19 = Moderately severe depression, 20-27 = Severe depression       GENERAL:Alert and oriented x 4 NAD, affect appropriate and obese, well hydrated, well developed.  LUNG:clear to auscultation bilaterally with normal respiratory effort  CV: Normal heart sounds, regular rate and rhythm without murmurs  EXTREMETY: no loss of hair, no edema, normal pedal pulses bilaterally            ASSESSMENT AND PLAN:       Nilda was seen today for hypertension.    Diagnoses and all orders for this visit:    Essential hypertension  Improved BP  -Stable, continue current medications.    Class 2 obesity due to excess calories without serious comorbidity with body mass index (BMI) of 39.0 to 39.9 in

## 2024-08-12 ENCOUNTER — OFFICE VISIT (OUTPATIENT)
Dept: FAMILY MEDICINE CLINIC | Age: 69
End: 2024-08-12
Payer: MEDICARE

## 2024-08-12 VITALS
SYSTOLIC BLOOD PRESSURE: 130 MMHG | BODY MASS INDEX: 38.94 KG/M2 | TEMPERATURE: 97.2 F | DIASTOLIC BLOOD PRESSURE: 80 MMHG | HEART RATE: 74 BPM | WEIGHT: 234 LBS | OXYGEN SATURATION: 88 %

## 2024-08-12 DIAGNOSIS — I10 ESSENTIAL HYPERTENSION: Primary | ICD-10-CM

## 2024-08-12 DIAGNOSIS — E66.09 CLASS 2 OBESITY DUE TO EXCESS CALORIES WITHOUT SERIOUS COMORBIDITY WITH BODY MASS INDEX (BMI) OF 39.0 TO 39.9 IN ADULT: ICD-10-CM

## 2024-08-12 PROCEDURE — 1090F PRES/ABSN URINE INCON ASSESS: CPT | Performed by: FAMILY MEDICINE

## 2024-08-12 PROCEDURE — 3079F DIAST BP 80-89 MM HG: CPT | Performed by: FAMILY MEDICINE

## 2024-08-12 PROCEDURE — 99214 OFFICE O/P EST MOD 30 MIN: CPT | Performed by: FAMILY MEDICINE

## 2024-08-12 PROCEDURE — 1123F ACP DISCUSS/DSCN MKR DOCD: CPT | Performed by: FAMILY MEDICINE

## 2024-08-12 PROCEDURE — G2211 COMPLEX E/M VISIT ADD ON: HCPCS | Performed by: FAMILY MEDICINE

## 2024-08-12 PROCEDURE — 3075F SYST BP GE 130 - 139MM HG: CPT | Performed by: FAMILY MEDICINE

## 2024-08-12 PROCEDURE — 4004F PT TOBACCO SCREEN RCVD TLK: CPT | Performed by: FAMILY MEDICINE

## 2024-08-12 PROCEDURE — G8417 CALC BMI ABV UP PARAM F/U: HCPCS | Performed by: FAMILY MEDICINE

## 2024-08-12 PROCEDURE — 3017F COLORECTAL CA SCREEN DOC REV: CPT | Performed by: FAMILY MEDICINE

## 2024-08-12 PROCEDURE — G8427 DOCREV CUR MEDS BY ELIG CLIN: HCPCS | Performed by: FAMILY MEDICINE

## 2024-08-12 PROCEDURE — G8399 PT W/DXA RESULTS DOCUMENT: HCPCS | Performed by: FAMILY MEDICINE

## 2024-08-12 RX ORDER — ATORVASTATIN CALCIUM 40 MG/1
40 TABLET, FILM COATED ORAL DAILY
Qty: 90 TABLET | Refills: 3 | Status: SHIPPED | OUTPATIENT
Start: 2024-08-12

## 2024-08-12 RX ORDER — DULOXETIN HYDROCHLORIDE 60 MG/1
60 CAPSULE, DELAYED RELEASE ORAL DAILY
Qty: 90 CAPSULE | Refills: 1 | OUTPATIENT
Start: 2024-08-12

## 2024-08-12 RX ORDER — DULOXETIN HYDROCHLORIDE 60 MG/1
60 CAPSULE, DELAYED RELEASE ORAL DAILY
Qty: 90 CAPSULE | Refills: 3 | Status: SHIPPED | OUTPATIENT
Start: 2024-08-12

## 2024-08-12 RX ORDER — METOPROLOL SUCCINATE 25 MG/1
25 TABLET, EXTENDED RELEASE ORAL DAILY
Qty: 90 TABLET | Refills: 1 | OUTPATIENT
Start: 2024-08-12

## 2024-08-12 RX ORDER — AMLODIPINE BESYLATE 5 MG/1
5 TABLET ORAL DAILY
Qty: 90 TABLET | Refills: 3 | Status: SHIPPED | OUTPATIENT
Start: 2024-08-12

## 2024-08-12 RX ORDER — BUPROPION HYDROCHLORIDE 150 MG/1
150 TABLET, EXTENDED RELEASE ORAL 2 TIMES DAILY
Qty: 60 TABLET | Refills: 0 | Status: SHIPPED | OUTPATIENT
Start: 2024-08-12

## 2024-08-12 RX ORDER — NALTREXONE HYDROCHLORIDE 50 MG/1
25 TABLET, FILM COATED ORAL 2 TIMES DAILY
Qty: 30 TABLET | Refills: 0 | Status: SHIPPED | OUTPATIENT
Start: 2024-08-12

## 2024-08-12 NOTE — TELEPHONE ENCOUNTER
Medication:   Requested Prescriptions     Pending Prescriptions Disp Refills    atorvastatin (LIPITOR) 40 MG tablet 90 tablet 3     Sig: Take 1 tablet by mouth daily     Refused Prescriptions Disp Refills    DULoxetine (CYMBALTA) 60 MG extended release capsule 90 capsule 1     Sig: Take 1 capsule by mouth daily     Refused By: VIRGIL WING     Reason for Refusal: Duplicate Request    metoprolol succinate (TOPROL XL) 25 MG extended release tablet 90 tablet 1     Sig: Take 1 tablet by mouth daily     Refused By: VIRGIL WING     Reason for Refusal: Patient has requested refill too soon          Patient Phone Number: [phone] (home)     Last appt: 6/26/2024   Next appt: 8/12/2024    Last OARRS:       10/6/2023    10:04 AM   RX Monitoring   Periodic Controlled Substance Monitoring No signs of potential drug abuse or diversion identified.     PDMP Monitoring:    Last PDMP [place] as Reviewed ([place]):  Review User Review Instant Review Result   MARIZA PERDOMO 10/6/2023 10:04 AM Reviewed PDMP [1]     Preferred Pharmacy:   [id number] [place], [place] - [address] - P [phone] - F [phone]  [address] [facility] [address]  Phone: [phone] Fax: [phone]    [id number] [place], [place] - [address] [facility] [address] - P [phone] - F [phone]  [address] [place] [address]  Phone: [phone] Fax: [phone]

## 2024-08-26 ENCOUNTER — HOSPITAL ENCOUNTER (OUTPATIENT)
Dept: VASCULAR LAB | Age: 69
Discharge: HOME | End: 2024-08-28
Attending: FAMILY MEDICINE
Payer: MEDICARE

## 2024-08-26 ENCOUNTER — HOSPITAL ENCOUNTER (OUTPATIENT)
Dept: CT IMAGING | Age: 69
Discharge: HOME | End: 2024-08-26
Attending: FAMILY MEDICINE
Payer: MEDICARE

## 2024-08-26 DIAGNOSIS — Z87.891 PERSONAL HISTORY OF TOBACCO USE: ICD-10-CM

## 2024-08-26 DIAGNOSIS — I77.1 STENOSIS OF LEFT SUBCLAVIAN ARTERY (HCC): ICD-10-CM

## 2024-08-26 DIAGNOSIS — I65.22 LEFT CAROTID STENOSIS: ICD-10-CM

## 2024-08-26 PROCEDURE — 71271 CT THORAX LUNG CANCER SCR C-: CPT

## 2024-08-26 PROCEDURE — 93880 EXTRACRANIAL BILAT STUDY: CPT

## 2024-08-27 LAB
VAS LEFT ARM BP: 118 MMHG
VAS LEFT CCA DIST EDV: 19.2 CM/S
VAS LEFT CCA DIST PSV: 48.2 CM/S
VAS LEFT CCA MID EDV: 19.2 CM/S
VAS LEFT CCA MID PSV: 63.4 CM/S
VAS LEFT CCA PROX EDV: 20.6 CM/S
VAS LEFT CCA PROX PSV: 58 CM/S
VAS LEFT ECA EDV: 15.7 CM/S
VAS LEFT ECA PSV: 60.9 CM/S
VAS LEFT ICA DIST EDV: 27.4 CM/S
VAS LEFT ICA DIST PSV: 60.8 CM/S
VAS LEFT ICA MID EDV: 25.6 CM/S
VAS LEFT ICA MID PSV: 60.4 CM/S
VAS LEFT ICA PROX EDV: 23.6 CM/S
VAS LEFT ICA PROX PSV: 60.4 CM/S
VAS LEFT ICA/CCA PSV: 1.25
VAS LEFT SUBCLAVIAN PROX PSV: 75.2 CM/S
VAS LEFT VERTEBRAL PSV: 54.1 CM/S
VAS RIGHT ARM BP: 178 MMHG
VAS RIGHT CCA DIST EDV: 18.9 CM/S
VAS RIGHT CCA DIST PSV: 46.1 CM/S
VAS RIGHT CCA MID EDV: 17 CM/S
VAS RIGHT CCA MID PSV: 56.5 CM/S
VAS RIGHT CCA PROX EDV: 28.7 CM/S
VAS RIGHT CCA PROX PSV: 93.9 CM/S
VAS RIGHT ECA EDV: 15.8 CM/S
VAS RIGHT ECA PSV: 57.5 CM/S
VAS RIGHT ICA DIST EDV: 28.7 CM/S
VAS RIGHT ICA DIST PSV: 86.7 CM/S
VAS RIGHT ICA MID EDV: 24.6 CM/S
VAS RIGHT ICA MID PSV: 61.5 CM/S
VAS RIGHT ICA PROX EDV: 22 CM/S
VAS RIGHT ICA PROX PSV: 58.8 CM/S
VAS RIGHT ICA/CCA PSV: 1.33
VAS RIGHT SUBCLAVIAN PROX PSV: 107 CM/S
VAS RIGHT VERTEBRAL EDV: 24.8 CM/S
VAS RIGHT VERTEBRAL PSV: 79.5 CM/S

## 2024-08-27 PROCEDURE — 93880 EXTRACRANIAL BILAT STUDY: CPT | Performed by: SURGERY

## 2024-10-21 ENCOUNTER — PATIENT MESSAGE (OUTPATIENT)
Dept: FAMILY MEDICINE CLINIC | Age: 69
End: 2024-10-21

## 2024-10-21 DIAGNOSIS — F41.9 ANXIETY: Primary | ICD-10-CM

## 2024-10-21 RX ORDER — DIAZEPAM 5 MG/1
5 TABLET ORAL EVERY 8 HOURS PRN
Qty: 8 TABLET | Refills: 0 | Status: SHIPPED | OUTPATIENT
Start: 2024-10-21 | End: 2024-10-24

## 2024-12-13 ENCOUNTER — PATIENT MESSAGE (OUTPATIENT)
Dept: FAMILY MEDICINE CLINIC | Age: 69
End: 2024-12-13

## 2025-03-17 LAB
ALBUMIN: 4 G/DL
ALP BLD-CCNC: 79 U/L
ALT SERPL-CCNC: 21 U/L
ANION GAP SERPL CALCULATED.3IONS-SCNC: ABNORMAL MMOL/L
AST SERPL-CCNC: 16 U/L
BASOPHILS ABSOLUTE: 0.1 /ΜL
BASOPHILS RELATIVE PERCENT: 1.1 %
BILIRUB SERPL-MCNC: 0.6 MG/DL
BUN BLDV-MCNC: 18 MG/DL
CALCIUM SERPL-MCNC: 9.6 MG/DL
CHLORIDE BLD-SCNC: 102 MMOL/L
CO2: 31 MMOL/L
CREAT SERPL-MCNC: 0.8 MG/DL
EOSINOPHILS ABSOLUTE: 0.2 /ΜL
EOSINOPHILS RELATIVE PERCENT: 2.2 %
FOLLICLE STIMULATING HORMONE: 93.1 (ref 25.8–134.8)
GFR, ESTIMATED: 84
GLUCOSE BLD-MCNC: 135 MG/DL
HCT VFR BLD CALC: 44.4 %
HEMOGLOBIN: 14.7 G/DL
LUTEINIZING HORMONE: 60.7
LYMPHOCYTES ABSOLUTE: 1.7 /ΜL
LYMPHOCYTES RELATIVE PERCENT: 20.8 %
Lab: 10.6 (ref 4.82–19.5)
MCH RBC QN AUTO: 30.9 PG
MCHC RBC AUTO-ENTMCNC: 33.1 G/DL
MCV RBC AUTO: 93.4 FL
MONOCYTES ABSOLUTE: 0.6 /ΜL
MONOCYTES RELATIVE PERCENT: 7.2 %
NEUTROPHILS ABSOLUTE: 5.5 /ΜL
NEUTROPHILS RELATIVE PERCENT: 68.7 %
PDW BLD-RTO: 14 %
PLATELET # BLD: 282 K/ΜL
PMV BLD AUTO: 8.1 FL
POTASSIUM SERPL-SCNC: 4.5 MMOL/L
RBC # BLD: 4.8 10^6/ΜL
SODIUM BLD-SCNC: 142 MMOL/L
T3 FREE: 2.99
T4 FREE: 1.24
TOTAL PROTEIN: 6.3 G/DL
TSH SERPL DL<=0.05 MIU/L-ACNC: 0.58 UIU/ML
WBC # BLD: 8 10^3/ML

## 2025-04-07 RX ORDER — METOPROLOL SUCCINATE 25 MG/1
25 TABLET, EXTENDED RELEASE ORAL DAILY
Qty: 90 TABLET | Refills: 0 | Status: SHIPPED | OUTPATIENT
Start: 2025-04-07

## 2025-04-07 NOTE — TELEPHONE ENCOUNTER
Medication:   Requested Prescriptions      No prescriptions requested or ordered in this encounter      Provider out of office.     Patient Phone Number: [phone] (home)     Last appt: 8/12/2024   Next appt: 6/13/2025    Last OARRS:       10/6/2023    10:04 AM   RX Monitoring   Periodic Controlled Substance Monitoring No signs of potential drug abuse or diversion identified.     PDMP Monitoring:    Last PDMP [place] as Reviewed ([place]):  Review User Review Instant Review Result   MARIZA PERDOMO 10/21/2024  1:03 PM Reviewed PDMP [1]     Preferred Pharmacy:   [id number] [place], [place] - [address] - P [phone] - F [phone]  [address] [place], [place] [address] [facility] [address]  Phone: [phone] Fax: [phone]    [id number] [place], [place] - [address] - P [phone] - F [phone]  [address] [place] [address]  Phone: [phone] Fax: [phone]

## 2025-05-16 ENCOUNTER — OFFICE VISIT (OUTPATIENT)
Dept: FAMILY MEDICINE CLINIC | Age: 70
End: 2025-05-16
Payer: MEDICARE

## 2025-05-16 VITALS
OXYGEN SATURATION: 98 % | HEART RATE: 76 BPM | SYSTOLIC BLOOD PRESSURE: 120 MMHG | BODY MASS INDEX: 39.04 KG/M2 | TEMPERATURE: 97.9 F | DIASTOLIC BLOOD PRESSURE: 74 MMHG | WEIGHT: 234.6 LBS

## 2025-05-16 DIAGNOSIS — I10 ESSENTIAL HYPERTENSION: ICD-10-CM

## 2025-05-16 DIAGNOSIS — F43.23 ADJUSTMENT DISORDER WITH MIXED ANXIETY AND DEPRESSED MOOD: ICD-10-CM

## 2025-05-16 DIAGNOSIS — H81.10 BENIGN PAROXYSMAL POSITIONAL VERTIGO, UNSPECIFIED LATERALITY: Primary | ICD-10-CM

## 2025-05-16 PROCEDURE — G8399 PT W/DXA RESULTS DOCUMENT: HCPCS | Performed by: FAMILY MEDICINE

## 2025-05-16 PROCEDURE — 1123F ACP DISCUSS/DSCN MKR DOCD: CPT | Performed by: FAMILY MEDICINE

## 2025-05-16 PROCEDURE — 4004F PT TOBACCO SCREEN RCVD TLK: CPT | Performed by: FAMILY MEDICINE

## 2025-05-16 PROCEDURE — 3074F SYST BP LT 130 MM HG: CPT | Performed by: FAMILY MEDICINE

## 2025-05-16 PROCEDURE — G8417 CALC BMI ABV UP PARAM F/U: HCPCS | Performed by: FAMILY MEDICINE

## 2025-05-16 PROCEDURE — 1159F MED LIST DOCD IN RCRD: CPT | Performed by: FAMILY MEDICINE

## 2025-05-16 PROCEDURE — 3078F DIAST BP <80 MM HG: CPT | Performed by: FAMILY MEDICINE

## 2025-05-16 PROCEDURE — G8427 DOCREV CUR MEDS BY ELIG CLIN: HCPCS | Performed by: FAMILY MEDICINE

## 2025-05-16 PROCEDURE — 99214 OFFICE O/P EST MOD 30 MIN: CPT | Performed by: FAMILY MEDICINE

## 2025-05-16 PROCEDURE — 3017F COLORECTAL CA SCREEN DOC REV: CPT | Performed by: FAMILY MEDICINE

## 2025-05-16 PROCEDURE — 1090F PRES/ABSN URINE INCON ASSESS: CPT | Performed by: FAMILY MEDICINE

## 2025-05-16 PROCEDURE — G2211 COMPLEX E/M VISIT ADD ON: HCPCS | Performed by: FAMILY MEDICINE

## 2025-05-16 RX ORDER — HYDROCHLOROTHIAZIDE 25 MG/1
25 TABLET ORAL DAILY
COMMUNITY
Start: 2025-03-14

## 2025-05-16 RX ORDER — MECLIZINE HYDROCHLORIDE 25 MG/1
25 TABLET ORAL 3 TIMES DAILY PRN
Qty: 30 TABLET | Refills: 1 | Status: SHIPPED | OUTPATIENT
Start: 2025-05-16

## 2025-05-16 RX ORDER — BUPROPION HYDROCHLORIDE 150 MG/1
150 TABLET ORAL DAILY
COMMUNITY
Start: 2025-02-10 | End: 2025-05-16 | Stop reason: SINTOL

## 2025-05-16 RX ORDER — BUSPIRONE HYDROCHLORIDE 10 MG/1
10 TABLET ORAL 2 TIMES DAILY
COMMUNITY
Start: 2025-03-17 | End: 2025-05-16 | Stop reason: ALTCHOICE

## 2025-05-16 SDOH — ECONOMIC STABILITY: FOOD INSECURITY: WITHIN THE PAST 12 MONTHS, THE FOOD YOU BOUGHT JUST DIDN'T LAST AND YOU DIDN'T HAVE MONEY TO GET MORE.: NEVER TRUE

## 2025-05-16 SDOH — ECONOMIC STABILITY: FOOD INSECURITY: WITHIN THE PAST 12 MONTHS, YOU WORRIED THAT YOUR FOOD WOULD RUN OUT BEFORE YOU GOT MONEY TO BUY MORE.: NEVER TRUE

## 2025-05-16 ASSESSMENT — PATIENT HEALTH QUESTIONNAIRE - PHQ9
SUM OF ALL RESPONSES TO PHQ QUESTIONS 1-9: 4
1. LITTLE INTEREST OR PLEASURE IN DOING THINGS: NEARLY EVERY DAY
SUM OF ALL RESPONSES TO PHQ QUESTIONS 1-9: 4
2. FEELING DOWN, DEPRESSED OR HOPELESS: SEVERAL DAYS
SUM OF ALL RESPONSES TO PHQ QUESTIONS 1-9: 4
SUM OF ALL RESPONSES TO PHQ QUESTIONS 1-9: 4

## 2025-05-16 NOTE — PROGRESS NOTES
Patient is here today to go over her medications and has had some issues.  Said that the MD that is in [place] has changed some of her medications.  States that she hasn't taken any of the Buspirone AS NEEDED, but she said that she isn't sure what as needed would consist of.  Her BP has been running low and has had dizziness the past couple of weeks, but has been going  on \"for awhile\".  Fatigue is bad for about a month also.  They took her off the Amlodipine and has been feeling bad since.  The swelling has resolved though since been taken off.  Having sweating that has causes her to be wet through clothes.       Has been having dizziness off and on x 6 months. Stopped amlodipine and changed the cymbalta to wellbutrin and buspirone but only taking the wellbutrin. States her patience since starting it is nil, \"I don't have patience with anything or anybody.\"  Finds herself being rude. Still very tired.     Dizziness has not improved with med changes but feels like worse since wellbutrin. Started 6 months ago. States has almost constantly but some days worse than others.  Has been checking BP and yesterday 101/56 and then 99/56. States usually 110-120's/70's. Feels like she is off balance, will get nausea when really bad, lifting head up or down sets it off. Getting out of bed - has to sit on edge of bed before gets up.  No falls. No hearing changes, no vision problems.      States \"I worry about shit too much.\" Not sure if it's depression or anxiety or what.       States back surgery - constant pain is gone which is a huge plus but still hard to walk. Needs to make appt to f/u with surgeon.       Vitals:    05/16/25 1426   BP: 120/74   BP Site: Left Upper Arm   Patient Position: Sitting   BP Cuff Size: Large Adult   Pulse: 76   Temp: 97.9 °F (36.6 °C)   TempSrc: Infrared   SpO2: 98%   Weight: 106.4 kg (234 lb 9.6 oz)     Wt Readings from Last 3 Encounters:   05/16/25 106.4 kg (234 lb 9.6 oz)   08/12/24 106.1 kg (234

## 2025-05-21 ENCOUNTER — RESULTS FOLLOW-UP (OUTPATIENT)
Dept: FAMILY MEDICINE CLINIC | Age: 70
End: 2025-05-21

## 2025-05-23 ENCOUNTER — APPOINTMENT (OUTPATIENT)
Dept: PHYSICAL THERAPY | Age: 70
End: 2025-05-23
Payer: MEDICARE

## 2025-05-27 ENCOUNTER — HOSPITAL ENCOUNTER (OUTPATIENT)
Dept: PHYSICAL THERAPY | Age: 70
Setting detail: THERAPIES SERIES
Discharge: HOME | End: 2025-05-27
Payer: MEDICARE

## 2025-05-27 DIAGNOSIS — R26.2 DIFFICULTY WALKING: ICD-10-CM

## 2025-05-27 DIAGNOSIS — R26.89 IMBALANCE: ICD-10-CM

## 2025-05-27 DIAGNOSIS — R42 DIZZINESS: Primary | ICD-10-CM

## 2025-05-27 DIAGNOSIS — H83.2X3 VESTIBULAR HYPOFUNCTION OF BOTH EARS: ICD-10-CM

## 2025-05-27 PROCEDURE — 97530 THERAPEUTIC ACTIVITIES: CPT

## 2025-05-27 PROCEDURE — 97112 NEUROMUSCULAR REEDUCATION: CPT

## 2025-05-27 PROCEDURE — 97161 PT EVAL LOW COMPLEX 20 MIN: CPT

## 2025-05-27 NOTE — PLAN OF CARE
dizziness/imbalance/symptoms to decrease by 50% to tolerate functional activities.   Status: [] Progressing: [] Met: [] Not Met: [] Adjusted    Long Term Goals: To be achieved in: 6 weeks / DC  DHI score of 20 or less to assist with return to prior level of function.    Status: [] Progressing: [] Met: [] Not Met: [] Adjusted  Patient will return to bending forward and looking up without increased symptoms or restriction to work towards return to prior level of function.        Status: [] Progressing: [] Met: [] Not Met: [] Adjusted  Patient will perform normal ADLs without symptoms 85% of the time.            Status: [] Progressing: [] Met: [] Not Met: [] Adjusted  Patient will perform normal household chores without symptoms 85% of the time.            Status: [] Progressing: [] Met: [] Not Met: [] Adjusted  Patient will improve [place] sensory organization/Modified CTSIB score to 5/7 in order to reduce fall risk.           Status: [] Progressing: [] Met: [] Not Met: [] Adjusted    Overall Progression Towards Functional goals/ Treatment Progress Update:  [] Patient is progressing as expected towards functional goals listed.    [] Progression is slowed due to complexities/Impairments listed.  [] Progression has been slowed due to co-morbidities.  [x] Plan just implemented, too soon (<30days) to assess goals progression   [] Goals require adjustment due to lack of progress  [] Patient is not progressing as expected and requires additional follow up with physician  [] Other:     TREATMENT PLAN     Frequency/Duration: 2x/week for 6 weeks for the following treatment interventions:    Interventions:  Therapeutic Exercise (27169) including: strength training, ROM, and functional mobility  Therapeutic Activities (67632) including: functional mobility training and education.  Neuromuscular Re-education (60286) activation and proprioception, including postural re-education.    Gait Training (76072) for normalization of

## 2025-06-04 ENCOUNTER — HOSPITAL ENCOUNTER (OUTPATIENT)
Dept: PHYSICAL THERAPY | Age: 70
Setting detail: THERAPIES SERIES
Discharge: HOME | End: 2025-06-04
Payer: MEDICARE

## 2025-06-04 PROCEDURE — 97110 THERAPEUTIC EXERCISES: CPT

## 2025-06-04 PROCEDURE — 97112 NEUROMUSCULAR REEDUCATION: CPT

## 2025-06-04 NOTE — PLAN OF CARE
[facility] - Outpatient Rehabilitation and Therapy: [address], [place] [address] office: [phone] fax: [phone]     Physical Therapy Initial Evaluation Certification      Dear Anni Wade MD,    We had the pleasure of evaluating the following patient for physical therapy services at [facility].  A summary of our findings can be found in the initial assessment below.  This includes our plan of care.  If you have any questions or concerns regarding these findings, please do not hesitate to contact me at the office phone number listed above.  Thank you for the referral.     Physician Signature:_______________________________Date:__________________  By signing above (or electronic signature), therapist’s plan is approved by physician       Physical Therapy: TREATMENT/PROGRESS NOTE   Patient: Nilda Gamboa (69 y.o. female)   Examination Date: 2025   :  1955 MRN: [id number]   Visit #: 2   Insurance Allowable Auth Needed   Mn []Yes    [x]No    Insurance: Payor: MEDICARE / Plan: MEDICARE PART A AND B / Product Type: *No Product type* /   Insurance ID: [id number] - (Medicare)  Secondary Insurance (if applicable): [facility]   Treatment Diagnosis:     ICD-10-CM    1. Dizziness  R42       2. Imbalance  R26.89       3. Difficulty walking  R26.2       4. Vestibular hypofunction of both ears  H83.2X3          Medical Diagnosis:  Benign paroxysmal positional vertigo, unspecified laterality [H81.10]   Referring Physician: Anni Wade MD  PCP: Anni Wade MD       Plan of care signed (Y/N):     Date of Patient follow up with Physician: KIRA     Plan of Care Report: NO  POC update due: (10 visits /OR AUTH LIMITS, whichever is less)  2025                                             Medical History:  Comorbidities:  Hypertension  Relevant Medical History: Back surgery , Angioplasty, 2 spinal fusions

## 2025-06-06 ENCOUNTER — HOSPITAL ENCOUNTER (OUTPATIENT)
Dept: PHYSICAL THERAPY | Age: 70
Setting detail: THERAPIES SERIES
Discharge: HOME | End: 2025-06-06
Payer: MEDICARE

## 2025-06-06 PROCEDURE — 97112 NEUROMUSCULAR REEDUCATION: CPT

## 2025-06-06 PROCEDURE — 97110 THERAPEUTIC EXERCISES: CPT

## 2025-06-06 NOTE — FLOWSHEET NOTE
tolerance, in order to progress toward full function.    Status: [] Progressing: [] Met: [] Not Met: [] Adjusted  Patient's subjective complaint of dizziness/imbalance/symptoms to decrease by 50% to tolerate functional activities.   Status: [] Progressing: [] Met: [] Not Met: [] Adjusted    Long Term Goals: To be achieved in: 6 weeks / DC  DHI score of 20 or less to assist with return to prior level of function.    Status: [] Progressing: [] Met: [] Not Met: [] Adjusted  Patient will return to bending forward and looking up without increased symptoms or restriction to work towards return to prior level of function.        Status: [] Progressing: [] Met: [] Not Met: [] Adjusted  Patient will perform normal ADLs without symptoms 85% of the time.            Status: [] Progressing: [] Met: [] Not Met: [] Adjusted  Patient will perform normal household chores without symptoms 85% of the time.            Status: [] Progressing: [] Met: [] Not Met: [] Adjusted  Patient will improve [place] sensory organization/Modified CTSIB score to 5/7 in order to reduce fall risk.           Status: [] Progressing: [] Met: [] Not Met: [] Adjusted    Overall Progression Towards Functional goals/ Treatment Progress Update:  [] Patient is progressing as expected towards functional goals listed.    [] Progression is slowed due to complexities/Impairments listed.  [] Progression has been slowed due to co-morbidities.  [x] Plan just implemented, too soon (<30days) to assess goals progression   [] Goals require adjustment due to lack of progress  [] Patient is not progressing as expected and requires additional follow up with physician  [] Other:     TREATMENT PLAN     Frequency/Duration: 2x/week for 6 weeks for the following treatment interventions:    Interventions:  Therapeutic Exercise (90275) including: strength training, ROM, and functional mobility  Therapeutic Activities (76473) including: functional mobility training and

## 2025-06-10 ENCOUNTER — HOSPITAL ENCOUNTER (OUTPATIENT)
Dept: PHYSICAL THERAPY | Age: 70
Setting detail: THERAPIES SERIES
Discharge: HOME | End: 2025-06-10
Payer: MEDICARE

## 2025-06-10 PROCEDURE — 97140 MANUAL THERAPY 1/> REGIONS: CPT

## 2025-06-10 PROCEDURE — 97112 NEUROMUSCULAR REEDUCATION: CPT

## 2025-06-10 PROCEDURE — 97110 THERAPEUTIC EXERCISES: CPT

## 2025-06-10 SDOH — HEALTH STABILITY: PHYSICAL HEALTH: ON AVERAGE, HOW MANY DAYS PER WEEK DO YOU ENGAGE IN MODERATE TO STRENUOUS EXERCISE (LIKE A BRISK WALK)?: 0 DAYS

## 2025-06-10 ASSESSMENT — PATIENT HEALTH QUESTIONNAIRE - PHQ9
SUM OF ALL RESPONSES TO PHQ QUESTIONS 1-9: 2
1. LITTLE INTEREST OR PLEASURE IN DOING THINGS: SEVERAL DAYS
SUM OF ALL RESPONSES TO PHQ QUESTIONS 1-9: 2
2. FEELING DOWN, DEPRESSED OR HOPELESS: SEVERAL DAYS
SUM OF ALL RESPONSES TO PHQ QUESTIONS 1-9: 2
SUM OF ALL RESPONSES TO PHQ QUESTIONS 1-9: 2

## 2025-06-10 ASSESSMENT — LIFESTYLE VARIABLES
HOW MANY STANDARD DRINKS CONTAINING ALCOHOL DO YOU HAVE ON A TYPICAL DAY: PATIENT DOES NOT DRINK
HOW OFTEN DO YOU HAVE SIX OR MORE DRINKS ON ONE OCCASION: 1
HOW MANY STANDARD DRINKS CONTAINING ALCOHOL DO YOU HAVE ON A TYPICAL DAY: 0
HOW OFTEN DO YOU HAVE A DRINK CONTAINING ALCOHOL: NEVER
HOW OFTEN DO YOU HAVE A DRINK CONTAINING ALCOHOL: 1

## 2025-06-10 NOTE — FLOWSHEET NOTE
[facility] - Outpatient Rehabilitation and Therapy: [address], [place] [address] office: [phone] fax: [phone]     Physical Therapy: TREATMENT/PROGRESS NOTE   Patient: Nilda Gamboa (69 y.o. female)   Examination Date: 06/10/2025   :  1955 MRN: [id number]   Visit #: 4   Insurance Allowable Auth Needed   Mn []Yes    [x]No    Insurance: Payor: MEDICARE / Plan: MEDICARE PART A AND B / Product Type: *No Product type* /   Insurance ID: [id number] - (Medicare)  Secondary Insurance (if applicable): [facility]   Treatment Diagnosis:     ICD-10-CM    1. Dizziness  R42       2. Imbalance  R26.89       3. Difficulty walking  R26.2       4. Vestibular hypofunction of both ears  H83.2X3          Medical Diagnosis:  Benign paroxysmal positional vertigo, unspecified laterality [H81.10]   Referring Physician: Anni Wade MD  PCP: Anni Wade MD       Plan of care signed (Y/N):     Date of Patient follow up with Physician: KIRA     Plan of Care Report: NO  POC update due: (10 visits /OR AUTH LIMITS, whichever is less)  2025                                             Medical History:  Comorbidities:  Hypertension  Relevant Medical History: Back surgery , Angioplasty, 2 spinal fusions                                          Precautions/ Contra-indications:           Latex allergy:  NO  Pacemaker:    NO  Contraindications for Manipulation: None  Date of Surgery: NA  Other:    Red Flags:  None    Suicide Screening:   The patient did not verbalize a primary behavioral concern, suicidal ideation, suicidal intent, or demonstrate suicidal behaviors.    Preferred Language for Healthcare:   [x] English       [] other:    SUBJECTIVE EXAMINATION     Patient stated complaint/comment:  Patient states that she only had lightheadedness  yesterday but no true dizziness.    Patient had intermittent dizziness over the last few days. Says she has been doing the HEP      Eval:

## 2025-06-11 NOTE — PROGRESS NOTES
The patient currently exhibits no signs or symptoms suggestive of lung cancer.  Discussed with patient the importance of compliance with yearly annual lung cancer screenings and willingness to undergo diagnosis and treatment if screening scan is positive.  In addition, the patient was counseled regarding the importance of remaining smoke free and/or total smoking cessation.    Also reviewed the following if the patient has Medicare that as of February 10, 2022, Medicare only covers LDCT screening in patients aged 50-77 with at least a 20 pack-year smoking history who currently smoke or have quit in the last 15 years

## 2025-06-12 PROBLEM — J43.2 CENTRILOBULAR EMPHYSEMA (HCC): Status: ACTIVE | Noted: 2025-06-12

## 2025-06-13 ENCOUNTER — OFFICE VISIT (OUTPATIENT)
Dept: FAMILY MEDICINE CLINIC | Age: 70
End: 2025-06-13

## 2025-06-13 ENCOUNTER — APPOINTMENT (OUTPATIENT)
Dept: PHYSICAL THERAPY | Age: 70
End: 2025-06-13
Payer: MEDICARE

## 2025-06-13 VITALS
DIASTOLIC BLOOD PRESSURE: 60 MMHG | SYSTOLIC BLOOD PRESSURE: 104 MMHG | WEIGHT: 233.4 LBS | OXYGEN SATURATION: 98 % | TEMPERATURE: 97.4 F | BODY MASS INDEX: 38.89 KG/M2 | HEIGHT: 65 IN | HEART RATE: 80 BPM

## 2025-06-13 DIAGNOSIS — Z00.00 WELL ADULT EXAM: Primary | ICD-10-CM

## 2025-06-13 DIAGNOSIS — H81.10 BENIGN PAROXYSMAL POSITIONAL VERTIGO, UNSPECIFIED LATERALITY: ICD-10-CM

## 2025-06-13 DIAGNOSIS — Z12.31 BREAST CANCER SCREENING BY MAMMOGRAM: ICD-10-CM

## 2025-06-13 DIAGNOSIS — I10 ESSENTIAL HYPERTENSION: ICD-10-CM

## 2025-06-13 DIAGNOSIS — F32.5 MAJOR DEPRESSIVE DISORDER WITH SINGLE EPISODE, IN FULL REMISSION: ICD-10-CM

## 2025-06-13 DIAGNOSIS — E66.01 MORBID (SEVERE) OBESITY DUE TO EXCESS CALORIES (HCC): ICD-10-CM

## 2025-06-13 DIAGNOSIS — J43.2 CENTRILOBULAR EMPHYSEMA (HCC): ICD-10-CM

## 2025-06-13 DIAGNOSIS — M85.89 OSTEOPENIA OF MULTIPLE SITES: ICD-10-CM

## 2025-06-13 DIAGNOSIS — R73.02 IGT (IMPAIRED GLUCOSE TOLERANCE): ICD-10-CM

## 2025-06-13 DIAGNOSIS — Z87.891 PERSONAL HISTORY OF TOBACCO USE: ICD-10-CM

## 2025-06-13 PROBLEM — F32.1 CURRENT MODERATE EPISODE OF MAJOR DEPRESSIVE DISORDER WITHOUT PRIOR EPISODE (HCC): Status: RESOLVED | Noted: 2024-06-10 | Resolved: 2025-06-13

## 2025-06-13 LAB
ALBUMIN SERPL-MCNC: 4.2 G/DL (ref 3.4–5)
ALBUMIN/GLOB SERPL: 1.9 {RATIO} (ref 1.1–2.2)
ALP SERPL-CCNC: 87 U/L (ref 40–129)
ALT SERPL-CCNC: 26 U/L (ref 10–40)
ANION GAP SERPL CALCULATED.3IONS-SCNC: 10 MMOL/L (ref 3–16)
AST SERPL-CCNC: 23 U/L (ref 15–37)
BILIRUB SERPL-MCNC: 0.7 MG/DL (ref 0–1)
BUN SERPL-MCNC: 27 MG/DL (ref 7–20)
CALCIUM SERPL-MCNC: 10.2 MG/DL (ref 8.3–10.6)
CHLORIDE SERPL-SCNC: 102 MMOL/L (ref 99–110)
CHOLEST SERPL-MCNC: 158 MG/DL (ref 0–199)
CO2 SERPL-SCNC: 30 MMOL/L (ref 21–32)
CREAT SERPL-MCNC: 1 MG/DL (ref 0.6–1.2)
EST. AVERAGE GLUCOSE BLD GHB EST-MCNC: 157.1 MG/DL
GFR SERPLBLD CREATININE-BSD FMLA CKD-EPI: 60 ML/MIN/{1.73_M2}
GLUCOSE SERPL-MCNC: 127 MG/DL (ref 70–99)
HBA1C MFR BLD: 7.1 %
HDLC SERPL-MCNC: 56 MG/DL (ref 40–60)
LDLC SERPL CALC-MCNC: 75 MG/DL
POTASSIUM SERPL-SCNC: 3.9 MMOL/L (ref 3.5–5.1)
PROT SERPL-MCNC: 6.4 G/DL (ref 6.4–8.2)
SODIUM SERPL-SCNC: 142 MMOL/L (ref 136–145)
TRIGL SERPL-MCNC: 136 MG/DL (ref 0–150)
VLDLC SERPL CALC-MCNC: 27 MG/DL

## 2025-06-13 RX ORDER — LOSARTAN POTASSIUM 100 MG/1
100 TABLET ORAL DAILY
Qty: 90 TABLET | Refills: 3 | Status: SHIPPED | OUTPATIENT
Start: 2025-06-13

## 2025-06-13 RX ORDER — ATORVASTATIN CALCIUM 40 MG/1
40 TABLET, FILM COATED ORAL DAILY
Qty: 90 TABLET | Refills: 3 | Status: SHIPPED | OUTPATIENT
Start: 2025-06-13

## 2025-06-13 RX ORDER — METOPROLOL SUCCINATE 25 MG/1
25 TABLET, EXTENDED RELEASE ORAL DAILY
Qty: 90 TABLET | Refills: 3 | Status: SHIPPED | OUTPATIENT
Start: 2025-06-13

## 2025-06-13 NOTE — PATIENT INSTRUCTIONS
improve your balance. These include the back leg raise, side leg raise, knee curl, and toe stand exercises.  In the beginning, using a chair or the wall for support will help you work on your balance safely.    Find more healthy lifestyle tips at tm0ihvg.milo.nih.gov    (Courtesy of [employer] at the National Institutes of Health)

## 2025-06-15 ENCOUNTER — RESULTS FOLLOW-UP (OUTPATIENT)
Dept: FAMILY MEDICINE CLINIC | Age: 70
End: 2025-06-15

## 2025-06-16 ENCOUNTER — HOSPITAL ENCOUNTER (OUTPATIENT)
Dept: PHYSICAL THERAPY | Age: 70
Setting detail: THERAPIES SERIES
Discharge: HOME | End: 2025-06-16
Payer: MEDICARE

## 2025-06-16 PROCEDURE — 97140 MANUAL THERAPY 1/> REGIONS: CPT

## 2025-06-16 PROCEDURE — 97112 NEUROMUSCULAR REEDUCATION: CPT

## 2025-06-16 NOTE — FLOWSHEET NOTE
EC     7.Fakuda Stepping test     If 6 and 7 are positive, there is a 95% sensitivity for UVL    Balance:  [] WNL      [] NT       [x] Dysfunction noted  Comment:     To be tested NPV    Gait Testing:   tested  Level of Assistance needed:  Independent  Gait Deviations (firm surface/linoleum):    None  Assistive Device Used:  No AD    Positional Testing    Nystagmus Direction Duration Vertigo Duration   Right Loaded Jana Hallpike neg      Left Loaded Dalton Hallpike neg      Right Sidelying Test       Left Sidelying Test       Right Roll Test       Left Roll Test                  Falls Risk Assessment (30 days):   Falls Risk assessed and no intervention required.  Time Up and Go (TUG):   Not Assessed     Exercises/Interventions     Therapeutic Ex (09432)  Resistance Sets/time Reps Notes/Cues/Progressions   Nustep   6/6 will discharge 6/4   IB   2/30'  6/4   Cervical stretches:  -rot, SB, flex/ ext   3' 5-7x  6/6, 6/4   Seated HSS   3/20  6/16          NMR re-education (12215)       Body sways  EO/EC-seated  Body circles EO/ EC  10  10  6/10, no dizziness 6/4    VOR  V/H ( seated)  Head nods  EO/EC   1 min   15 6/16, no dizziness 6/10 no dizziness  6/6 slight dizziness V/H   6/4 Dizziness w/ H  Dizziness w/ H    Cone placement ( 5)       Head turns/ nod w/ strobe card       Diagonal Head movement    20 6/16 inc reps no dizziness  , 6/6 slight dizziness    Forward fold ( seated ) EO/EC    10 6/16 , 6/4 Slight dizziness    Therapeutic Activity (25702)       Sit to stand   2 5 6/16, 6/10, 6/4                 Manual Intervention (74119) Time:  10'   STM, SOR, PROM to cervical spine             Canalith Repositioning Technique (46378)   Modified Dalton carter pike B ( neg)           Modalities:    No modalities applied this session    Education/Home Exercise Program: Patient HEP program created electronically.  Refer to [email or web address] access code:  [email or web address]       ASSESSMENT       Today's Assessment: Pt had a good session today.

## 2025-06-18 ENCOUNTER — APPOINTMENT (OUTPATIENT)
Dept: PHYSICAL THERAPY | Age: 70
End: 2025-06-18
Payer: MEDICARE

## 2025-06-23 ENCOUNTER — HOSPITAL ENCOUNTER (OUTPATIENT)
Dept: PHYSICAL THERAPY | Age: 70
Setting detail: THERAPIES SERIES
Discharge: HOME | End: 2025-06-23
Payer: MEDICARE

## 2025-06-23 PROCEDURE — 97140 MANUAL THERAPY 1/> REGIONS: CPT

## 2025-06-23 PROCEDURE — 97112 NEUROMUSCULAR REEDUCATION: CPT

## 2025-06-23 NOTE — FLOWSHEET NOTE
[facility] - Outpatient Rehabilitation and Therapy: [address], [place] [address] office: [phone] fax: [phone]     Physical Therapy: TREATMENT/PROGRESS NOTE   Patient: Nilda Gamboa (69 y.o. female)   Examination Date: 2025   :  1955 MRN: [id number]   Visit #: 6   Insurance Allowable Auth Needed   Mn []Yes    [x]No    Insurance: Payor: MEDICARE / Plan: MEDICARE PART A AND B / Product Type: *No Product type* /   Insurance ID: [id number] - (Medicare)  Secondary Insurance (if applicable): [facility]   Treatment Diagnosis:     ICD-10-CM    1. Dizziness  R42       2. Imbalance  R26.89       3. Difficulty walking  R26.2       4. Vestibular hypofunction of both ears  H83.2X3          Medical Diagnosis:  Benign paroxysmal positional vertigo, unspecified laterality [H81.10]   Referring Physician: Anni Wade MD  PCP: Anni Wade MD       Plan of care signed (Y/N):     Date of Patient follow up with Physician: KIRA     Plan of Care Report: NO  POC update due: (10 visits /OR AUTH LIMITS, whichever is less)  2025                                             Medical History:  Comorbidities:  Hypertension  Relevant Medical History: Back surgery , Angioplasty, 2 spinal fusions                                          Precautions/ Contra-indications:           Latex allergy:  NO  Pacemaker:    NO  Contraindications for Manipulation: None  Date of Surgery: NA  Other:    Red Flags:  None    Suicide Screening:   The patient did not verbalize a primary behavioral concern, suicidal ideation, suicidal intent, or demonstrate suicidal behaviors.    Preferred Language for Healthcare:   [x] English       [] other:    SUBJECTIVE EXAMINATION     Patient stated complaint/comment:  Patient reports that she has had a good few days. Says she noticed while closing her drapes no dizziness.    Patient reports that she had one minor episode of dizziness     Patient states

## 2025-06-25 ENCOUNTER — HOSPITAL ENCOUNTER (OUTPATIENT)
Dept: PHYSICAL THERAPY | Age: 70
Setting detail: THERAPIES SERIES
Discharge: HOME | End: 2025-06-25
Payer: MEDICARE

## 2025-06-25 PROCEDURE — 97112 NEUROMUSCULAR REEDUCATION: CPT

## 2025-06-25 NOTE — PLAN OF CARE
[facility] - Outpatient Rehabilitation and Therapy: [address], [place] [address] office: [phone] fax: [phone]   Physical Therapy Re-Certification Plan of Care    Dear Anni Wade MD  ,    We had the pleasure of treating the following patient for physical therapy services at [facility] Outpatient Physical Therapy. A summary of our findings can be found in the updated assessment below.  This includes our plan of care.  If you have any questions or concerns regarding these findings, please do not hesitate to contact me at the office phone number checked above.  Thank you for the referral.     Physician Signature:________________________________Date:__________________  By signing above (or electronic signature), therapist's plan is approved by physician      Total Visits: 7     Overall Response to Treatment:  The patient has made good progress toward functional rehab goals. She has met all STGs. In the week in half she has had no dizziness with daily activities. No vertigo with lying down or rolling over in bed . Pt to benefit from continued vestibular rehab as needed .     Recommendation:    [] Continue PT 1x / wk for 4 weeks.   [] Hold PT, pending MD visit   [] Discharge to [facility]. Follow up with PT or MD PRN.    Physical Therapy: TREATMENT/PROGRESS NOTE   Patient: Nilda Gamboa (69 y.o. female)   Examination Date: 2025   :  1955 MRN: [id number]   Visit #: 7   Insurance Allowable Auth Needed   Mn []Yes    [x]No    Insurance: Payor: MEDICARE / Plan: MEDICARE PART A AND B / Product Type: *No Product type* /   Insurance ID: [id number] - (Medicare)  Secondary Insurance (if applicable): [facility]   Treatment Diagnosis:     ICD-10-CM    1. Dizziness  R42       2. Imbalance  R26.89       3. Difficulty walking  R26.2       4. Vestibular hypofunction of both ears  H83.2X3          Medical Diagnosis:  Benign paroxysmal positional vertigo, unspecified

## 2025-06-30 ENCOUNTER — HOSPITAL ENCOUNTER (OUTPATIENT)
Dept: PHYSICAL THERAPY | Age: 70
Setting detail: THERAPIES SERIES
Discharge: HOME | End: 2025-06-30
Payer: MEDICARE

## 2025-06-30 PROCEDURE — 97112 NEUROMUSCULAR REEDUCATION: CPT

## 2025-06-30 NOTE — PLAN OF CARE
services to address the deficits outlined in the patients goals    Prognosis/Rehab Potential: Excellent    Patient requires continued skilled intervention: [x] Yes  [] No      CHARGE CAPTURE     PT CHARGE GRID   CPT Code (TIMED) minutes # CPT Code (UNTIMED) #     Therex (37045)     EVAL:LOW (71612 - Typically 20 minutes face-to-face)     Neuromusc. Re-ed (03486) 30 2  Re-Eval (49731)     Manual (91864)    Estim Unattended (57497)     Ther. Act (92293)    Mech. Traction (04230)     Gait (90946)    Dry Needle 1-2 muscle (24382)     Aquatic Therex (48456)    Dry Needle 3+ muscle (35812)     Iontophoresis (61729)    VASO (98870)     Ultrasound (29738)    Group Therapy (14800)     Estim Attended (50131)    Canalith Repositioning (63347)     Physical Performance Test (18494)    Custom orthotic ()     Other:    Other:    Total Timed Code Tx Minutes 30 2       Total Treatment Minutes 30        Charge Justification:  (22859) NEUROMUSCULAR RE-EDUCATION - Provided therapeutic procedure on activities related to neuromuscular reeducation of movement, balance, coordination, kinesthetic sense, posture, and/or proprioception for sitting and/or standing activities. Provided HEP review and/or progression.      GOALS     Patient stated goal: To improve dizziness and balance   Status:  [] Progressing: [] Met: [] Not Met: [] Adjusted    Therapist goals for Patient:   Short Term Goals: To be achieved in: 2 weeks  Independent in HEP and progression per patient tolerance, in order to progress toward full function.    Status: [] Progressing: [x] Met: [] Not Met: [] Adjusted  Patient's subjective complaint of dizziness/imbalance/symptoms to decrease by 50% to tolerate functional activities.   Status: [] Progressing: [x] Met: [] Not Met: [] Adjusted    Long Term Goals: To be achieved in: 6 weeks / DC  DHI score of 20 or less to assist with return to prior level of function.    Status: [] Progressing: [x] Met: [] Not Met: []

## 2025-07-01 ENCOUNTER — OFFICE VISIT (OUTPATIENT)
Dept: FAMILY MEDICINE CLINIC | Age: 70
End: 2025-07-01

## 2025-07-01 VITALS
TEMPERATURE: 97.2 F | OXYGEN SATURATION: 98 % | DIASTOLIC BLOOD PRESSURE: 60 MMHG | WEIGHT: 230.4 LBS | SYSTOLIC BLOOD PRESSURE: 100 MMHG | BODY MASS INDEX: 38.05 KG/M2 | HEART RATE: 82 BPM

## 2025-07-01 DIAGNOSIS — E11.65 TYPE 2 DIABETES MELLITUS WITH HYPERGLYCEMIA, WITHOUT LONG-TERM CURRENT USE OF INSULIN (HCC): Primary | ICD-10-CM

## 2025-07-01 RX ORDER — GLUCOSAMINE HCL/CHONDROITIN SU 500-400 MG
CAPSULE ORAL
Qty: 100 STRIP | Refills: 4 | Status: SHIPPED | OUTPATIENT
Start: 2025-07-01

## 2025-07-01 RX ORDER — METFORMIN HYDROCHLORIDE 500 MG/1
500 TABLET, EXTENDED RELEASE ORAL
Qty: 60 TABLET | Refills: 2 | Status: SHIPPED | OUTPATIENT
Start: 2025-07-01

## 2025-07-01 RX ORDER — BLOOD-GLUCOSE METER
1 KIT MISCELLANEOUS DAILY
Qty: 1 KIT | Refills: 0 | Status: SHIPPED | OUTPATIENT
Start: 2025-07-01

## 2025-07-01 RX ORDER — AVOBENZONE, HOMOSALATE, OCTISALATE, OCTOCRYLENE 30; 40; 45; 26 MG/ML; MG/ML; MG/ML; MG/ML
1 CREAM TOPICAL DAILY
Qty: 100 EACH | Refills: 5 | Status: SHIPPED | OUTPATIENT
Start: 2025-07-01

## 2025-07-01 ASSESSMENT — PATIENT HEALTH QUESTIONNAIRE - PHQ9
8. MOVING OR SPEAKING SO SLOWLY THAT OTHER PEOPLE COULD HAVE NOTICED. OR THE OPPOSITE, BEING SO FIGETY OR RESTLESS THAT YOU HAVE BEEN MOVING AROUND A LOT MORE THAN USUAL: NOT AT ALL
9. THOUGHTS THAT YOU WOULD BE BETTER OFF DEAD, OR OF HURTING YOURSELF: NOT AT ALL
3. TROUBLE FALLING OR STAYING ASLEEP: MORE THAN HALF THE DAYS
2. FEELING DOWN, DEPRESSED OR HOPELESS: NOT AT ALL
6. FEELING BAD ABOUT YOURSELF - OR THAT YOU ARE A FAILURE OR HAVE LET YOURSELF OR YOUR FAMILY DOWN: NOT AT ALL
1. LITTLE INTEREST OR PLEASURE IN DOING THINGS: NOT AT ALL
10. IF YOU CHECKED OFF ANY PROBLEMS, HOW DIFFICULT HAVE THESE PROBLEMS MADE IT FOR YOU TO DO YOUR WORK, TAKE CARE OF THINGS AT HOME, OR GET ALONG WITH OTHER PEOPLE: SOMEWHAT DIFFICULT
SUM OF ALL RESPONSES TO PHQ QUESTIONS 1-9: 4
SUM OF ALL RESPONSES TO PHQ QUESTIONS 1-9: 4
7. TROUBLE CONCENTRATING ON THINGS, SUCH AS READING THE NEWSPAPER OR WATCHING TELEVISION: NOT AT ALL
SUM OF ALL RESPONSES TO PHQ QUESTIONS 1-9: 4
4. FEELING TIRED OR HAVING LITTLE ENERGY: SEVERAL DAYS
SUM OF ALL RESPONSES TO PHQ QUESTIONS 1-9: 4
5. POOR APPETITE OR OVEREATING: SEVERAL DAYS

## 2025-07-01 NOTE — PROGRESS NOTES
Chief Complaint   Patient presents with    Diabetes    Results     Discuss blood test results         History of Present Illness  The patient presents for evaluation of diabetes. New diagnosis based on recent labs. Previous hx IGT.     Diabetes  - She has been diagnosed with diabetes and is concerned about potential side effects of metformin, particularly dizziness, weight gain, and insomnia.  - She has not yet started any medication for her condition.  - A1c is 7.1%.  - Her current physical activity is limited by her back, although she does engage in some stretching exercises.  - She misses walking but finds pool exercises easier.  - She undergoes an eye exam every spring and experiences constant numbness in her feet.  - She consumes a lot of fresh vegetables daily with her main meal. Her  has been adjusting their diet and working toward Mediterranean Diet.       Dizziness  - She has been referred to physical therapy for dizziness and has been doing other stretches as well.  - PT has been helpful with sx      Vitals:    07/01/25 0854   BP: 100/60   BP Site: Left Upper Arm   Patient Position: Sitting   BP Cuff Size: Large Adult   Pulse: 82   Temp: 97.2 °F (36.2 °C)   TempSrc: Infrared   SpO2: 98%   Weight: 104.5 kg (230 lb 6.4 oz)     Wt Readings from Last 3 Encounters:   07/01/25 104.5 kg (230 lb 6.4 oz)   06/13/25 105.9 kg (233 lb 6.4 oz)   05/16/25 106.4 kg (234 lb 9.6 oz)     Body mass index is 38.05 kg/m².      7/1/2025     9:08 AM 6/10/2025     1:21 PM 5/16/2025     2:15 PM 6/10/2024     7:12 AM 6/6/2023     9:32 AM 5/16/2023     2:35 PM 5/4/2022     5:02 PM   PHQ Scores   PHQ2 Score 0 2  4 3 2 0 0   PHQ9 Score 4 2  4 10 2 0 0       Patient-reported       Interpretation of Total Score Depression Severity: 1-4 = Minimal depression, 5-9 = Mild depression, 10-14 = Moderate depression, 15-19 = Moderately severe depression, 20-27 = Severe depression       GEN: Alert and oriented x 4 NAD, affect appropriate

## 2025-07-02 ENCOUNTER — HOSPITAL ENCOUNTER (OUTPATIENT)
Dept: PHYSICAL THERAPY | Age: 70
Setting detail: THERAPIES SERIES
Discharge: HOME | End: 2025-07-02
Payer: MEDICARE

## 2025-07-02 PROCEDURE — 97112 NEUROMUSCULAR REEDUCATION: CPT

## 2025-07-02 PROCEDURE — 97140 MANUAL THERAPY 1/> REGIONS: CPT

## 2025-07-02 NOTE — PLAN OF CARE
[facility] - Outpatient Rehabilitation and Therapy: [address], [place] [address] office: [phone] fax: [phone]  Recommendation:    [] Continue PT 1x / wk for 4 weeks.   [] Hold PT, pending MD visit   [] Discharge to [facility]. Follow up with PT or MD PRN.    Physical Therapy: TREATMENT/PROGRESS NOTE   Patient: Nilda Gamboa (69 y.o. female)   Examination Date: 2025   :  1955 MRN: [id number]   Visit #: 9   Insurance Allowable Auth Needed   Mn []Yes    [x]No    Insurance: Payor: MEDICARE / Plan: MEDICARE PART A AND B / Product Type: *No Product type* /   Insurance ID: [id number] - (Medicare)  Secondary Insurance (if applicable): [facility]   Treatment Diagnosis:     ICD-10-CM    1. Dizziness  R42       2. Imbalance  R26.89       3. Difficulty walking  R26.2       4. Vestibular hypofunction of both ears  H83.2X3          Medical Diagnosis:  Benign paroxysmal positional vertigo, unspecified laterality [H81.10]   Referring Physician: Anni Wade MD  PCP: Anni Wade MD       Plan of care signed (Y/N):     Date of Patient follow up with Physician: KIRA     Plan of Care Report: YES, Date Range for this report: 25 to 2025  POC update due: (10 visits /OR AUTH LIMITS, whichever is less)  2025                                             Medical History:  Comorbidities:  Hypertension  Relevant Medical History: Back surgery , Angioplasty, 2 spinal fusions                                          Precautions/ Contra-indications:           Latex allergy:  NO  Pacemaker:    NO  Contraindications for Manipulation: None  Date of Surgery: NA  Other:    Red Flags:  None    Suicide Screening:   The patient did not verbalize a primary behavioral concern, suicidal ideation, suicidal intent, or demonstrate suicidal behaviors.    Preferred Language for Healthcare:   [x] English       [] other:    SUBJECTIVE EXAMINATION     Patient stated complaint/comment:

## 2025-07-03 ENCOUNTER — TELEPHONE (OUTPATIENT)
Dept: ADMINISTRATIVE | Age: 70
End: 2025-07-03

## 2025-07-03 NOTE — TELEPHONE ENCOUNTER
Submitted PA for metFORMIN HCl ER 500MG er tablets   Via [facility] (Key: [id number]) STATUS: ARCHIVED.    This medication or product is on your plan's list of covered drugs. Prior authorization is not required at this time. If your pharmacy has questions regarding the processing of your prescription, please have them call the [facility] pharmacy help desk at [phone].     If this requires a response please respond to the pool ( P MHCX PSC MEDICATION PRE-AUTH).      Thank you please advise patient.

## 2025-07-10 ENCOUNTER — HOSPITAL ENCOUNTER (OUTPATIENT)
Dept: PHYSICAL THERAPY | Age: 70
Setting detail: THERAPIES SERIES
Discharge: HOME | End: 2025-07-10
Payer: MEDICARE

## 2025-07-10 PROCEDURE — 97112 NEUROMUSCULAR REEDUCATION: CPT

## 2025-07-10 PROCEDURE — 97110 THERAPEUTIC EXERCISES: CPT

## 2025-07-10 NOTE — THERAPY DISCHARGE
[facility] - [facility]: [address], [place] [address] office: [phone] fax: [phone]   Physical Therapy Discharge Summary    Dear Anni Wade MD   ,    We had the pleasure of treating the following patient for physical therapy services at [facility].  A summary of our findings can be found in the discharge summary below.  If you have any questions or concerns regarding these findings, please do not hesitate to contact me at the office phone number checked above.  Thank you for the referral.         Total Visits: 10     Recommendation:   [] Hold PT, pending MD visit   [x] Discharge to HEP. Follow up with PT or MD PRN.     Reason for Discharge:  The patient has continued to demonstrate improvement with no reports of dizziness. She has met all STGs and 4/5 LTGs at this time. She is able to carry out all ADLs without light headedness or feeling off. The patient has been encouraged to purchase a chair yoga DVD along with updated HEP to maintain her functional mobility.  She discharged at this time.          Physical Therapy: TREATMENT/PROGRESS NOTE   Patient: Nilda Gamboa (69 y.o. female)   Examination Date: 07/10/2025   :  1955 MRN: [id number]   Visit #: 10   Insurance Allowable Auth Needed   Mn []Yes    [x]No    Insurance: Payor: MEDICARE / Plan: MEDICARE PART A AND B / Product Type: *No Product type* /   Insurance ID: [id number] - (Medicare)  Secondary Insurance (if applicable): [facility]   Treatment Diagnosis:     ICD-10-CM    1. Dizziness  R42       2. Imbalance  R26.89       3. Difficulty walking  R26.2       4. Vestibular hypofunction of both ears  H83.2X3          Medical Diagnosis:  Benign paroxysmal positional vertigo, unspecified laterality [H81.10]   Referring Physician: Anni Wade MD  PCP: Anni Wade MD       Plan of care signed (Y/N):     Date of Patient follow up with

## 2025-08-28 ENCOUNTER — HOSPITAL ENCOUNTER (OUTPATIENT)
Dept: WOMENS IMAGING | Age: 70
Discharge: HOME | End: 2025-08-28
Attending: FAMILY MEDICINE
Payer: MEDICARE

## 2025-08-28 ENCOUNTER — HOSPITAL ENCOUNTER (OUTPATIENT)
Dept: GENERAL RADIOLOGY | Age: 70
Discharge: HOME | End: 2025-08-28
Attending: FAMILY MEDICINE
Payer: MEDICARE

## 2025-08-28 ENCOUNTER — HOSPITAL ENCOUNTER (OUTPATIENT)
Dept: CT IMAGING | Age: 70
Discharge: HOME | End: 2025-08-28
Attending: FAMILY MEDICINE
Payer: MEDICARE

## 2025-08-28 VITALS — WEIGHT: 217 LBS | HEIGHT: 65 IN | BODY MASS INDEX: 36.15 KG/M2

## 2025-08-28 DIAGNOSIS — Z87.891 PERSONAL HISTORY OF TOBACCO USE: ICD-10-CM

## 2025-08-28 DIAGNOSIS — M85.89 OSTEOPENIA OF MULTIPLE SITES: ICD-10-CM

## 2025-08-28 DIAGNOSIS — Z12.31 BREAST CANCER SCREENING BY MAMMOGRAM: ICD-10-CM

## 2025-08-28 PROCEDURE — 71271 CT THORAX LUNG CANCER SCR C-: CPT

## 2025-08-28 PROCEDURE — 77080 DXA BONE DENSITY AXIAL: CPT

## 2025-08-28 PROCEDURE — 77063 BREAST TOMOSYNTHESIS BI: CPT
